# Patient Record
Sex: FEMALE | Race: WHITE | NOT HISPANIC OR LATINO | Employment: UNEMPLOYED | URBAN - METROPOLITAN AREA
[De-identification: names, ages, dates, MRNs, and addresses within clinical notes are randomized per-mention and may not be internally consistent; named-entity substitution may affect disease eponyms.]

---

## 2023-10-03 ENCOUNTER — TELEPHONE (OUTPATIENT)
Dept: PRIMARY CARE | Facility: CLINIC | Age: 58
End: 2023-10-03
Payer: COMMERCIAL

## 2023-10-03 DIAGNOSIS — F41.9 ANXIETY: Primary | ICD-10-CM

## 2023-10-04 RX ORDER — ALPRAZOLAM 0.5 MG/1
0.5 TABLET ORAL
COMMUNITY
Start: 2019-04-10 | End: 2024-01-17 | Stop reason: WASHOUT

## 2023-10-04 RX ORDER — ALPRAZOLAM 0.5 MG/1
0.5-1 TABLET ORAL 2 TIMES DAILY
Qty: 120 TABLET | Refills: 3 | Status: SHIPPED | OUTPATIENT
Start: 2023-10-04 | End: 2024-01-17 | Stop reason: WASHOUT

## 2023-10-22 PROBLEM — M51.369 DEGENERATIVE DISC DISEASE, LUMBAR: Status: ACTIVE | Noted: 2023-10-22

## 2023-10-22 PROBLEM — N95.1 MENOPAUSAL SYMPTOMS: Status: ACTIVE | Noted: 2023-10-22

## 2023-10-22 PROBLEM — R73.03 PREDIABETES: Status: ACTIVE | Noted: 2023-10-22

## 2023-10-22 PROBLEM — R00.2 PALPITATIONS: Status: ACTIVE | Noted: 2023-10-22

## 2023-10-22 PROBLEM — N63.0 BREAST LUMP: Status: ACTIVE | Noted: 2023-10-22

## 2023-10-22 PROBLEM — E03.9 HYPOTHYROIDISM: Status: ACTIVE | Noted: 2023-10-22

## 2023-10-22 PROBLEM — R13.12 OROPHARYNGEAL DYSPHAGIA: Status: ACTIVE | Noted: 2023-10-22

## 2023-10-22 PROBLEM — E66.01 MORBID OBESITY (MULTI): Status: ACTIVE | Noted: 2023-10-22

## 2023-10-22 PROBLEM — I73.9 PERIPHERAL VASCULAR DISEASE (CMS-HCC): Status: ACTIVE | Noted: 2023-10-22

## 2023-10-22 PROBLEM — F41.9 ANXIETY: Status: ACTIVE | Noted: 2023-10-22

## 2023-10-22 PROBLEM — I10 UNCONTROLLED HYPERTENSION: Status: ACTIVE | Noted: 2023-10-22

## 2023-10-22 PROBLEM — E88.819 INSULIN RESISTANCE: Status: ACTIVE | Noted: 2023-10-22

## 2023-10-22 PROBLEM — I87.2 CHRONIC VENOUS INSUFFICIENCY: Status: ACTIVE | Noted: 2023-10-22

## 2023-10-22 PROBLEM — E78.5 HYPERLIPIDEMIA: Status: ACTIVE | Noted: 2023-10-22

## 2023-10-22 PROBLEM — G47.33 OBSTRUCTIVE SLEEP APNEA (ADULT) (PEDIATRIC): Status: ACTIVE | Noted: 2023-10-22

## 2023-10-22 PROBLEM — M17.0 PRIMARY OSTEOARTHRITIS OF BOTH KNEES: Status: ACTIVE | Noted: 2023-10-22

## 2023-10-22 PROBLEM — H91.93 BILATERAL HEARING LOSS: Status: ACTIVE | Noted: 2023-10-22

## 2023-10-22 PROBLEM — N20.0 NEPHROLITHIASIS: Status: ACTIVE | Noted: 2023-10-22

## 2023-10-22 PROBLEM — L03.119 CELLULITIS OF LOWER LEG: Status: ACTIVE | Noted: 2023-10-22

## 2023-10-22 PROBLEM — J31.0 CHRONIC RHINITIS: Status: ACTIVE | Noted: 2023-10-22

## 2023-10-22 PROBLEM — E11.9 DIABETES MELLITUS WITHOUT COMPLICATION (MULTI): Status: ACTIVE | Noted: 2023-10-22

## 2023-10-22 PROBLEM — F41.1 GENERALIZED ANXIETY DISORDER: Status: ACTIVE | Noted: 2023-10-22

## 2023-10-22 PROBLEM — I10 ESSENTIAL HYPERTENSION: Status: ACTIVE | Noted: 2023-10-22

## 2023-10-22 PROBLEM — M25.473 ANKLE EDEMA: Status: ACTIVE | Noted: 2023-10-22

## 2023-10-22 PROBLEM — K21.9 GASTROESOPHAGEAL REFLUX DISEASE: Status: ACTIVE | Noted: 2023-10-22

## 2023-10-22 PROBLEM — M51.36 DEGENERATIVE DISC DISEASE, LUMBAR: Status: ACTIVE | Noted: 2023-10-22

## 2023-10-22 RX ORDER — FUROSEMIDE 20 MG/1
20 TABLET ORAL DAILY
COMMUNITY
End: 2023-11-13

## 2023-10-22 RX ORDER — LISINOPRIL 20 MG/1
1 TABLET ORAL DAILY
COMMUNITY
Start: 2019-04-10 | End: 2024-02-22 | Stop reason: ALTCHOICE

## 2023-10-22 RX ORDER — IPRATROPIUM BROMIDE 21 UG/1
2 SPRAY, METERED NASAL 2 TIMES DAILY
COMMUNITY
End: 2024-02-22 | Stop reason: ALTCHOICE

## 2023-10-22 RX ORDER — TRIAMCINOLONE ACETONIDE 1 MG/G
1 CREAM TOPICAL 2 TIMES DAILY
COMMUNITY

## 2023-10-22 RX ORDER — OMEPRAZOLE 20 MG/1
20 CAPSULE, DELAYED RELEASE ORAL
COMMUNITY
End: 2024-01-23

## 2023-10-22 RX ORDER — METOPROLOL SUCCINATE 50 MG/1
50 TABLET, EXTENDED RELEASE ORAL DAILY
COMMUNITY
Start: 2019-04-10 | End: 2024-02-22 | Stop reason: ALTCHOICE

## 2023-10-22 RX ORDER — LISINOPRIL 40 MG/1
40 TABLET ORAL DAILY
COMMUNITY
End: 2024-01-10

## 2023-10-22 RX ORDER — BLOOD-GLUCOSE CONTROL, NORMAL
EACH MISCELLANEOUS DAILY
COMMUNITY

## 2023-10-22 RX ORDER — NYSTATIN 100000 [USP'U]/G
1 POWDER TOPICAL 2 TIMES DAILY
COMMUNITY
Start: 2019-09-10 | End: 2024-02-22 | Stop reason: ALTCHOICE

## 2023-10-22 RX ORDER — MEDICAL SUPPLY, MISCELLANEOUS
EACH MISCELLANEOUS
COMMUNITY
Start: 2023-09-18

## 2023-10-22 RX ORDER — DULAGLUTIDE 0.75 MG/.5ML
0.75 INJECTION, SOLUTION SUBCUTANEOUS
COMMUNITY
Start: 2022-08-09 | End: 2024-02-22 | Stop reason: ALTCHOICE

## 2023-10-22 RX ORDER — KETOCONAZOLE 20 MG/G
1 CREAM TOPICAL 2 TIMES DAILY
COMMUNITY
End: 2023-11-21 | Stop reason: SDUPTHER

## 2023-10-22 RX ORDER — LANCETS 33 GAUGE
EACH MISCELLANEOUS
COMMUNITY
Start: 2023-09-27

## 2023-10-22 RX ORDER — IBUPROFEN 800 MG/1
800 TABLET ORAL 3 TIMES DAILY PRN
COMMUNITY
End: 2023-12-06 | Stop reason: SDUPTHER

## 2023-10-22 RX ORDER — KETOCONAZOLE 20 MG/ML
1 SHAMPOO, SUSPENSION TOPICAL 2 TIMES DAILY
COMMUNITY
End: 2023-11-21 | Stop reason: SDUPTHER

## 2023-10-22 RX ORDER — HYDROCHLOROTHIAZIDE 25 MG/1
25 TABLET ORAL DAILY PRN
COMMUNITY
End: 2024-02-22 | Stop reason: ALTCHOICE

## 2023-10-22 RX ORDER — MELOXICAM 15 MG/1
15 TABLET ORAL DAILY
COMMUNITY
Start: 2023-06-06 | End: 2024-02-22 | Stop reason: ALTCHOICE

## 2023-10-22 RX ORDER — ACETAMINOPHEN AND CODEINE PHOSPHATE 300; 30 MG/1; MG/1
1 TABLET ORAL EVERY 6 HOURS PRN
COMMUNITY
End: 2024-02-22 | Stop reason: ALTCHOICE

## 2023-10-22 RX ORDER — VITAMIN A 3000 MCG
2 CAPSULE ORAL DAILY PRN
COMMUNITY
Start: 2023-09-21

## 2023-10-22 RX ORDER — ESTRADIOL 0.1 MG/D
1 PATCH, EXTENDED RELEASE TRANSDERMAL 2 TIMES WEEKLY
COMMUNITY

## 2023-10-22 RX ORDER — ALBUTEROL SULFATE 90 UG/1
AEROSOL, METERED RESPIRATORY (INHALATION)
COMMUNITY
Start: 2021-04-10 | End: 2023-11-21 | Stop reason: SDUPTHER

## 2023-10-22 RX ORDER — HYDROCORTISONE 1 %
1 CREAM (GRAM) TOPICAL 2 TIMES DAILY
COMMUNITY
Start: 2023-08-30 | End: 2024-04-12

## 2023-10-22 RX ORDER — LIDOCAINE 50 MG/G
1 PATCH TOPICAL DAILY PRN
COMMUNITY
End: 2024-02-22 | Stop reason: ALTCHOICE

## 2023-10-22 RX ORDER — ESTRADIOL 0.03 MG/D
FILM, EXTENDED RELEASE TRANSDERMAL
COMMUNITY

## 2023-10-22 RX ORDER — BLOOD SUGAR DIAGNOSTIC
STRIP MISCELLANEOUS
COMMUNITY
Start: 2022-03-09

## 2023-10-22 RX ORDER — LEVOTHYROXINE SODIUM 50 UG/1
50 TABLET ORAL
COMMUNITY
End: 2024-02-22 | Stop reason: ALTCHOICE

## 2023-10-22 RX ORDER — FLUTICASONE PROPIONATE 50 UG/1
2 SPRAY, METERED NASAL DAILY
COMMUNITY
Start: 2021-04-27

## 2023-10-22 RX ORDER — NAPROXEN SODIUM 550 MG/1
550 TABLET ORAL 2 TIMES DAILY PRN
COMMUNITY
End: 2024-02-22 | Stop reason: ALTCHOICE

## 2023-10-24 ENCOUNTER — OFFICE VISIT (OUTPATIENT)
Dept: OTOLARYNGOLOGY | Facility: CLINIC | Age: 58
End: 2023-10-24
Payer: COMMERCIAL

## 2023-10-24 ENCOUNTER — CLINICAL SUPPORT (OUTPATIENT)
Dept: AUDIOLOGY | Facility: CLINIC | Age: 58
End: 2023-10-24
Payer: COMMERCIAL

## 2023-10-24 VITALS — TEMPERATURE: 96.8 F | BODY MASS INDEX: 43.4 KG/M2 | WEIGHT: 293 LBS | HEIGHT: 69 IN

## 2023-10-24 DIAGNOSIS — L30.9 DERMATITIS: ICD-10-CM

## 2023-10-24 DIAGNOSIS — H60.549 DERMATITIS OF EAR CANAL, UNSPECIFIED LATERALITY: ICD-10-CM

## 2023-10-24 DIAGNOSIS — R42 DIZZINESS: ICD-10-CM

## 2023-10-24 DIAGNOSIS — H90.3 BILATERAL SENSORINEURAL HEARING LOSS: Primary | ICD-10-CM

## 2023-10-24 DIAGNOSIS — R09.82 POSTNASAL DRIP: ICD-10-CM

## 2023-10-24 DIAGNOSIS — H90.3 SENSORINEURAL HEARING LOSS (SNHL) OF BOTH EARS: Primary | ICD-10-CM

## 2023-10-24 DIAGNOSIS — H93.13 TINNITUS OF BOTH EARS: ICD-10-CM

## 2023-10-24 PROCEDURE — 92557 COMPREHENSIVE HEARING TEST: CPT | Performed by: AUDIOLOGIST

## 2023-10-24 PROCEDURE — 99214 OFFICE O/P EST MOD 30 MIN: CPT | Performed by: OTOLARYNGOLOGY

## 2023-10-24 PROCEDURE — 3044F HG A1C LEVEL LT 7.0%: CPT | Performed by: OTOLARYNGOLOGY

## 2023-10-24 PROCEDURE — 92550 TYMPANOMETRY & REFLEX THRESH: CPT | Performed by: AUDIOLOGIST

## 2023-10-24 PROCEDURE — 4010F ACE/ARB THERAPY RXD/TAKEN: CPT | Performed by: OTOLARYNGOLOGY

## 2023-10-24 PROCEDURE — 1036F TOBACCO NON-USER: CPT | Performed by: OTOLARYNGOLOGY

## 2023-10-24 RX ORDER — FLUOCINOLONE ACETONIDE 0.11 MG/ML
OIL AURICULAR (OTIC)
Qty: 20 ML | Refills: 1 | Status: SHIPPED | OUTPATIENT
Start: 2023-10-24 | End: 2024-03-28 | Stop reason: WASHOUT

## 2023-10-24 NOTE — PROGRESS NOTES
LETICIA Love is a 58 y.o. female presents to discuss more difficulty hearing in background noise.  She had an audiogram today with bilateral sensorineural hearing loss, normal tympanometry.  76% word recognition scores bilaterally.    She also has moisture of the ear canal and itching bilaterally.  She has a history of psoriasis but has not gotten benefit from topical medications that have been prescribed to her in the past    Bothered by postnasal drainage.  She has a lot of intolerances to different nasal sprays.  She uses Flonase at times but it causes some burning.  She is not clear which other nasal sprays she has tried      Past Medical History:   Diagnosis Date    Angina at rest     Obstructive sleep apnea (adult) (pediatric)     Obstructive sleep apnea, adult    Other specified diabetes mellitus without complications (CMS/HCC)     Diabetes mellitus of other type without complication    Other specified diabetes mellitus without complications (CMS/[facility])     Diabetes mellitus of other type without complication    Personal history of other diseases of the circulatory system     History of hypertension    Personal history of other diseases of the circulatory system     History of angina    Personal history of other diseases of the circulatory system     History of hypertension    Personal history of other diseases of the nervous system and sense organs     History of obstructive sleep apnea    Personal history of other diseases of the respiratory system     History of asthma    Personal history of other specified conditions     History of chest pain    Unspecified asthma with (acute) exacerbation     Asthma with acute exacerbation, unspecified asthma severity, unspecified whether persistent            Medications:     Current Outpatient Medications:     acetaminophen-codeine (Tylenol w/ Codeine #3) 300-30 mg tablet, Take 1 tablet by mouth every 6 hours if needed for moderate pain (4 - 6) or severe pain (7  - 10)., Disp: , Rfl:     albuterol (ProAir HFA) 90 mcg/actuation inhaler, USE AS DIRECTED, Disp: , Rfl:     ALPRAZolam (Xanax) 0.5 mg tablet, take 1 to 2 tablets by mouth twice a day, Disp: 120 tablet, Rfl: 3    ALPRAZolam (Xanax) 0.5 mg tablet, Take 1 tablet (0.5 mg) by mouth., Disp: , Rfl:     dulaglutide (Trulicity) 0.75 mg/0.5 mL pen injector, Inject 0.75 mg under the skin 1 (one) time per week., Disp: , Rfl:     estradiol (Vivelle-DOT) 0.025 mg/24 hr patch, Place on the skin., Disp: , Rfl:     Flonase Allergy Relief 50 mcg/actuation nasal spray, Administer 2 sprays into each nostril once daily., Disp: , Rfl:     FreeStyle Test strip, USE AS DIRECTED., Disp: , Rfl:     furosemide (Lasix) 20 mg tablet, Take 1 tablet (20 mg) by mouth once daily., Disp: , Rfl:     hydroCHLOROthiazide (HYDRODiuril) 25 mg tablet, Take 1 tablet (25 mg) by mouth once daily as needed., Disp: , Rfl:     hydrocortisone 1 % cream, Apply 1 Application topically 2 times a day., Disp: , Rfl:     ibuprofen 800 mg tablet, Take 1 tablet (800 mg) by mouth 3 times a day as needed. WITH FOOD OR MILK, Disp: , Rfl:     ipratropium (Atrovent) 21 mcg (0.03 %) nasal spray, Administer 2 sprays into each nostril 2 times a day., Disp: , Rfl:     ketoconazole (NIZOral) 2 % cream, Apply 1 Application topically 2 times a day., Disp: , Rfl:     ketoconazole (NIZOral) 2 % shampoo, Apply 1 Application topically 2 times a day., Disp: , Rfl:     lancets 30 gauge misc, once daily. CHECK ONCE DAILY, Disp: , Rfl:     levothyroxine (Synthroid, Levoxyl) 50 mcg tablet, Take 1 tablet (50 mcg) by mouth once daily in the morning. Take before meals., Disp: , Rfl:     lidocaine (Lidoderm) 5 % patch, Place 1 patch on the skin once daily as needed for severe pain (7 - 10), moderate pain (4 - 6) or mild pain (1 - 3)., Disp: , Rfl:     lisinopril 20 mg tablet, Take 1 tablet (20 mg) by mouth once daily., Disp: , Rfl:     lisinopril 40 mg tablet, Take 1 tablet (40 mg) by mouth  "once daily., Disp: , Rfl:     meloxicam (Mobic) 15 mg tablet, Take 1 tablet (15 mg) by mouth once daily., Disp: , Rfl:     metoprolol succinate XL (Toprol-XL) 50 mg 24 hr tablet, Take 1 tablet (50 mg) by mouth once daily., Disp: , Rfl:     naproxen sodium (Anaprox) 550 mg tablet, Take 1 tablet (550 mg) by mouth 2 times a day as needed for mild pain (1 - 3)., Disp: , Rfl:     nystatin (Nystop) 100,000 unit/gram powder, Apply 1 Application topically 2 times a day., Disp: , Rfl:     omeprazole (PriLOSEC) 20 mg DR capsule, Take 1 capsule (20 mg) by mouth once daily in the morning. Take before meals., Disp: , Rfl:     OneTouch Delica Plus Lancet 30 gauge misc, USE AS DIRECTED, Disp: , Rfl:     OneTouch Ultra Control solution, AS DIRECTED IN VITRO DAILY AS NEEDED, Disp: , Rfl:     Saline NasaL 0.65 % nasal spray, Administer 2 sprays into each nostril once daily as needed., Disp: , Rfl:     triamcinolone (Kenalog) 0.1 % cream, Apply 1 Application topically 2 times a day., Disp: , Rfl:     Vivelle-Dot 0.1 mg/24 hr patch, Place 1 patch on the skin 2 times a week., Disp: , Rfl:      Allergies:  Allergies   Allergen Reactions    Clarithromycin Anaphylaxis and Unknown    Diazepam Anaphylaxis    Erythromycin Anaphylaxis    Lorazepam Anaphylaxis and Unknown    Meperidine Anaphylaxis    Morphine Anaphylaxis    Oxycodone Anaphylaxis and Unknown    Pertussis Vaccines Anaphylaxis    Prednisone Anaphylaxis and Unknown    Tramadol Anaphylaxis    Acetaminophen Unknown    Amoxicillin Unknown    Aspirin Unknown    Dicyclomine Unknown    Diphenoxylate-Atropine Unknown    Doxycycline Unknown    Hydrocodone Unknown    Hydrocodone-Acetaminophen Unknown    Propoxyphene-Acetaminophen Unknown    Pse-Hydrocodone-Pot Guaiaco Unknown        Physical Exam:  Last Recorded Vitals  Temperature 36 °C (96.8 °F), height 1.753 m (5' 9\"), weight 139 kg (306 lb).  General:     General appearance: Well-developed, well-nourished in no acute distress.       " Voice:  normal       Head/face: Normal appearance; nontender to palpation     Facial nerve function: Normal and symmetric bilaterally.    Oral/oropharynx:     Oral vestibule: Normal labial and gingival mucosa     Tongue/floor of mouth: Normal without lesion     Oropharynx: Clear.  No lesions present of the hard/soft palate, posterior pharynx    Neck:     Neck: Normal appearance, trachea midline     Salivary glands: Normal to palpation bilaterally     Lymph nodes: No cervical lymphadenopathy to palpation     Thyroid: No thyromegaly.  No palpable nodules     Range of motion: Normal    Neurological:     Cortical functions: Alert and oriented x3, appropriate affect       Larynx/hypopharynx:     Laryngeal findings: Mirror exam inadequate or limited secondary to enlarged base of tongue and/or excessive gagging    Ear:     Ear canal: Dermatitic changes of the external auditory meatus and canal bilaterally.  No evidence of infection     Tympanic membrane: Intact and mobile bilaterally     Pinna: Normal bilaterally     Hearing:  Gross hearing assessment normal by voice    Nose:     Visualized using: Anterior rhinoscopy     Nasopharynx: Inadequate mirror exam secondary to gag, anatomy.       Nasal dorsum: Nontraumatic midline appearance     Septum: Midline     Inferior turbinates: Normally sized     Mucosa: Bilateral, pink, normal appearing       Assessment/Plan   Her chief issue today is explained by her bilateral hearing loss.  I have completed Medicaid paperwork and recommended hearing aid evaluation.  Recheck audiogram in a year    The itching of her ear canals is apparently dermatitic.  It may be related to her diagnosis of psoriasis.  I recommended a trial of fluocinolone oil and if this is unsuccessful we may try some other topical preparations.  I suggested a dermatology appointment for her multiple skin issues.    We discussed trialing different over-the-counter nasal steroid sprays.  She will do this and keep track  of the benefit or lack thereof and whether or not they are causing her any side effects.  Recheck 1 year, sooner as needed         Yoni Escalante MD

## 2023-10-24 NOTE — PROGRESS NOTES
AUDIOLOGY ADULT AUDIOMETRIC EVALUATION    Name:  Saba Love  :  1965  Age:  58 y.o.  Date of Evaluation:  2023    Reason for visit: Patient is seen in the clinic today at the request of otolaryngology for an audiologic evaluation.     HISTORY  Patient complains of long term hearing loss, childhood ear infections, dizziness, tinnitus and difficulty understanding words.  She complained of some ear pain during tympanogram.    EVALUATION  See scanned audiogram: “Media” > “Audiology Report”.  No images are attached to the encounter or orders placed in the encounter.    RESULTS  Otoscopic Evaluation:  Right Ear: clear ear canal  Left Ear: clear ear canal    Immittance Measures:  Tympanometry:  Right Ear: A  Left Ear:    Ad    Acoustic Reflexes:  Ipsilateral Right Ear: 90 100 100   Ipsilateral Left Ear:    80 85 95  Contralateral Right Ear: did not evaluate  Contralateral Left Ear: did not evaluate    Distortion Product Otoacoustic Emissions (DPOAEs):  Right Ear: PASS 2345  Left Ear:   PASS 80172    Audiometry:  Test Technique and Reliability: FAIR  Standard audiometry via INSERTS. Reliability is FAIR    Pure tone air and bone conduction audiometry:  Right Ear: MAY BE ELEVATED RESULTS OF MILD SNHL TO NORMAL THRESHOLDS .   Left Ear: MAY BE ELEVATED RESULTS OF MODERATE MILD  SNHL TONORMAL THRESHOLDS    Speech Audiometry (Word Recognition Scores):   Right Ear: 76  Left Ear:   76    IMPRESSIONS    POSSIBLY ELEVATED MILD MODERATE HEARING LOSS.  CMN GIVEN AND A RETEST BEFORE FITTING IS RECOMMENDED FOR ACCURACY    The presence of acoustic reflexes within normal intensity limits is consistent with normal middle ear and brainstem function, and suggests that auditory sensitivity is not significantly impaired. An elevated or absent acoustic reflex threshold is consistent with a middle ear disorder, hearing loss in the stimulated ear, and/or interruption of neural innervation of the stapedius muscle. Present  DPOAEs suggest normal/near normal cochlear outer hair cell function and are consistent with no greater than a mild hearing loss at those frequencies. Absent DPOAEs are consistent with abnormal cochlear outer hair cell function and some degree of hearing loss at those frequencies.    RECOMMENDATIONS  - Follow up with otolaryngology today as scheduled.  - Audiologic evaluation as needed.  - Annual audiologic evaluation, sooner if an acute change is noted.  - Audiologic evaluation in conjunction with otologic care, if an acute change is noted, and/or annually.  - Consider hearing aids. Contact insurance to determine if there is an applicable benefit and where it can be used. Contact our office to schedule an appointment should she wish to proceed with hearing aids through our clinic.  - Consider hearing aids. Contact insurance to determine if there is an applicable benefit and where it can be used.  - CMN GIVEN  - Follow-up with audiology annually for routine hearing aid maintenance, sooner if questions/problems arise.  - Follow-up with medical care team as planned.    PATIENT EDUCATION  Discussed results, impressions and recommendations with the patient. Questions were addressed and the patient was encouraged to contact our office should concerns arise.    Time for this encounter: 35-40

## 2023-10-26 ENCOUNTER — TELEPHONE (OUTPATIENT)
Dept: PRIMARY CARE | Facility: CLINIC | Age: 58
End: 2023-10-26
Payer: COMMERCIAL

## 2023-10-26 NOTE — TELEPHONE ENCOUNTER
Pt called stating she would like an Rx for a new walker - she states hers is old and margaret -- she also states she was instructed her current walker could be causing her the Lt arm & chest pain; she states the pain increased after using her walker - she is requesting an Rx for a walker TALL & WIDE  to better suit her and alleviate her pain --- pt is requesting a call back in regard to this --- she can be reached at [phone]

## 2023-10-26 NOTE — TELEPHONE ENCOUNTER
Pt called stating she is out of medication for her inhaler - pt states insurance will only cover one specific inhaler written as such: Albuterol Sulfate HFA Inhaler 90mcg 8.5 grams -- this is required to pass medical necessity

## 2023-11-11 DIAGNOSIS — I87.2 VENOUS INSUFFICIENCY (CHRONIC) (PERIPHERAL): ICD-10-CM

## 2023-11-13 RX ORDER — FUROSEMIDE 20 MG/1
20 TABLET ORAL DAILY
Qty: 90 TABLET | Refills: 1 | Status: SHIPPED | OUTPATIENT
Start: 2023-11-13 | End: 2024-03-28 | Stop reason: SDUPTHER

## 2023-11-21 DIAGNOSIS — L21.0 SEBORRHEA CAPITIS: ICD-10-CM

## 2023-11-21 DIAGNOSIS — R05.9 COUGH, UNSPECIFIED TYPE: Primary | ICD-10-CM

## 2023-11-22 DIAGNOSIS — L21.0 SEBORRHEA CAPITIS: Primary | ICD-10-CM

## 2023-11-22 RX ORDER — ALBUTEROL SULFATE 90 UG/1
2 AEROSOL, METERED RESPIRATORY (INHALATION) EVERY 4 HOURS PRN
Qty: 8 G | Refills: 5 | Status: SHIPPED | OUTPATIENT
Start: 2023-11-22 | End: 2024-11-21

## 2023-11-22 RX ORDER — KETOCONAZOLE 20 MG/G
1 CREAM TOPICAL 2 TIMES DAILY
Qty: 60 G | Refills: 3 | Status: SHIPPED | OUTPATIENT
Start: 2023-11-22 | End: 2024-04-12

## 2023-11-22 RX ORDER — ALBUTEROL SULFATE 90 UG/1
2 AEROSOL, METERED RESPIRATORY (INHALATION) EVERY 6 HOURS PRN
Qty: 8.5 G | Refills: 2 | Status: SHIPPED | OUTPATIENT
Start: 2023-11-22 | End: 2023-11-29 | Stop reason: SDUPTHER

## 2023-11-22 RX ORDER — KETOCONAZOLE 20 MG/ML
1 SHAMPOO, SUSPENSION TOPICAL 2 TIMES DAILY
Qty: 120 ML | Refills: 3 | Status: SHIPPED | OUTPATIENT
Start: 2023-11-22 | End: 2023-12-05 | Stop reason: SDUPTHER

## 2023-11-22 RX ORDER — KETOCONAZOLE 20 MG/ML
SHAMPOO, SUSPENSION TOPICAL
Qty: 360 ML | Refills: 11 | Status: SHIPPED | OUTPATIENT
Start: 2023-11-22 | End: 2024-03-28 | Stop reason: SDUPTHER

## 2023-11-29 ENCOUNTER — TELEPHONE (OUTPATIENT)
Dept: PRIMARY CARE | Facility: CLINIC | Age: 58
End: 2023-11-29
Payer: COMMERCIAL

## 2023-11-29 DIAGNOSIS — R05.9 COUGH, UNSPECIFIED TYPE: ICD-10-CM

## 2023-11-29 DIAGNOSIS — E11.9 DIABETES MELLITUS WITHOUT COMPLICATION (MULTI): Primary | ICD-10-CM

## 2023-11-29 RX ORDER — ALBUTEROL SULFATE 90 UG/1
2 AEROSOL, METERED RESPIRATORY (INHALATION) EVERY 6 HOURS PRN
Qty: 8.5 G | Refills: 2 | Status: SHIPPED | OUTPATIENT
Start: 2023-11-29

## 2023-11-29 RX ORDER — ALBUTEROL SULFATE 90 UG/1
2 AEROSOL, METERED RESPIRATORY (INHALATION) EVERY 6 HOURS PRN
Qty: 8.5 G | Refills: 2 | Status: SHIPPED | OUTPATIENT
Start: 2023-11-29 | End: 2023-11-29

## 2023-11-29 NOTE — TELEPHONE ENCOUNTER
Patient called stating she needs the exact RX sent in due to her insurance. Pro Air HFA Albuterol Sulfate 90 MCG. Sent to [facility].

## 2023-11-30 RX ORDER — LANCETS 30 GAUGE
EACH MISCELLANEOUS
Qty: 1 EACH | Refills: 1 | Status: SHIPPED | OUTPATIENT
Start: 2023-11-30

## 2023-12-05 DIAGNOSIS — L21.0 SEBORRHEA CAPITIS: ICD-10-CM

## 2023-12-05 DIAGNOSIS — M51.36 DDD (DEGENERATIVE DISC DISEASE), LUMBAR: Primary | ICD-10-CM

## 2023-12-05 RX ORDER — KETOCONAZOLE 20 MG/ML
1 SHAMPOO, SUSPENSION TOPICAL 2 TIMES DAILY
Qty: 360 ML | Refills: 3 | Status: SHIPPED | OUTPATIENT
Start: 2023-12-05 | End: 2023-12-13 | Stop reason: SDUPTHER

## 2023-12-05 NOTE — TELEPHONE ENCOUNTER
ketoconazole (NIZOral) 2 % shampoo     Pt called stating her Rx for this shampoo is supposed to written for x3 bottles at one time -- she states she is going out of town Friday, and she would like this refilled asap

## 2023-12-06 RX ORDER — IBUPROFEN 800 MG/1
TABLET ORAL
Qty: 90 TABLET | Refills: 3 | Status: SHIPPED | OUTPATIENT
Start: 2023-12-06 | End: 2024-05-09

## 2023-12-12 ENCOUNTER — TELEPHONE (OUTPATIENT)
Dept: PRIMARY CARE | Facility: CLINIC | Age: 58
End: 2023-12-12
Payer: COMMERCIAL

## 2023-12-12 NOTE — TELEPHONE ENCOUNTER
[facility] for [facility] roll[facility]? If a list of approved companies is needed you can call Hattie @ [facility] [phone].

## 2023-12-13 DIAGNOSIS — L21.0 SEBORRHEA CAPITIS: ICD-10-CM

## 2023-12-13 RX ORDER — KETOCONAZOLE 20 MG/ML
1 SHAMPOO, SUSPENSION TOPICAL 2 TIMES DAILY
Qty: 360 ML | Refills: 3 | Status: SHIPPED | OUTPATIENT
Start: 2023-12-13

## 2023-12-15 ENCOUNTER — TELEPHONE (OUTPATIENT)
Dept: PRIMARY CARE | Facility: CLINIC | Age: 58
End: 2023-12-15
Payer: COMMERCIAL

## 2023-12-27 ENCOUNTER — TELEPHONE (OUTPATIENT)
Dept: PRIMARY CARE | Facility: CLINIC | Age: 58
End: 2023-12-27
Payer: COMMERCIAL

## 2023-12-27 NOTE — TELEPHONE ENCOUNTER
Patient needs prior authorization for ketoconazole shampoo.  She needs 3 bottles per month.  She was told a prior auth was needed due to the amount.

## 2024-01-05 DIAGNOSIS — F41.9 ANXIETY: ICD-10-CM

## 2024-01-05 RX ORDER — ALPRAZOLAM 0.5 MG/1
0.5-1 TABLET ORAL 2 TIMES DAILY
Qty: 120 TABLET | Refills: 0 | OUTPATIENT
Start: 2024-01-05

## 2024-01-05 NOTE — TELEPHONE ENCOUNTER
PT called again requesting status on this - she states [facility] has not received fax - she would like to verify [facility] in [place] - she is requesting a call back

## 2024-01-10 DIAGNOSIS — I10 ESSENTIAL (PRIMARY) HYPERTENSION: ICD-10-CM

## 2024-01-10 RX ORDER — LISINOPRIL 40 MG/1
40 TABLET ORAL DAILY
Qty: 90 TABLET | Refills: 3 | Status: SHIPPED | OUTPATIENT
Start: 2024-01-10 | End: 2024-05-07 | Stop reason: ALTCHOICE

## 2024-01-17 ENCOUNTER — OFFICE VISIT (OUTPATIENT)
Dept: PRIMARY CARE | Facility: CLINIC | Age: 59
End: 2024-01-17
Payer: COMMERCIAL

## 2024-01-17 VITALS
SYSTOLIC BLOOD PRESSURE: 148 MMHG | HEART RATE: 95 BPM | WEIGHT: 293 LBS | DIASTOLIC BLOOD PRESSURE: 102 MMHG | BODY MASS INDEX: 44.41 KG/M2 | HEIGHT: 68 IN | OXYGEN SATURATION: 96 %

## 2024-01-17 DIAGNOSIS — I10 ESSENTIAL HYPERTENSION: Primary | ICD-10-CM

## 2024-01-17 DIAGNOSIS — I87.2 CHRONIC VENOUS INSUFFICIENCY: ICD-10-CM

## 2024-01-17 DIAGNOSIS — E88.819 INSULIN RESISTANCE: ICD-10-CM

## 2024-01-17 DIAGNOSIS — F41.9 ANXIETY: ICD-10-CM

## 2024-01-17 DIAGNOSIS — S81.802D OPEN WOUND OF LEFT LOWER LEG, SUBSEQUENT ENCOUNTER: ICD-10-CM

## 2024-01-17 DIAGNOSIS — E11.9 DIABETES MELLITUS WITHOUT COMPLICATION (MULTI): ICD-10-CM

## 2024-01-17 LAB — POC HEMOGLOBIN A1C: 5.9 % (ref 4.2–6.5)

## 2024-01-17 PROCEDURE — 3080F DIAST BP >= 90 MM HG: CPT | Performed by: PHYSICIAN ASSISTANT

## 2024-01-17 PROCEDURE — 4010F ACE/ARB THERAPY RXD/TAKEN: CPT | Performed by: PHYSICIAN ASSISTANT

## 2024-01-17 PROCEDURE — 99214 OFFICE O/P EST MOD 30 MIN: CPT | Performed by: PHYSICIAN ASSISTANT

## 2024-01-17 PROCEDURE — 1036F TOBACCO NON-USER: CPT | Performed by: PHYSICIAN ASSISTANT

## 2024-01-17 PROCEDURE — 3077F SYST BP >= 140 MM HG: CPT | Performed by: PHYSICIAN ASSISTANT

## 2024-01-17 RX ORDER — ALPRAZOLAM 0.5 MG/1
0.5-1 TABLET ORAL 2 TIMES DAILY
Qty: 120 TABLET | Refills: 2 | Status: SHIPPED | OUTPATIENT
Start: 2024-02-02 | End: 2024-03-28 | Stop reason: SDUPTHER

## 2024-01-17 RX ORDER — ACETAMINOPHEN 500 MG
TABLET ORAL
Qty: 1 EACH | Refills: 0 | Status: SHIPPED | OUTPATIENT
Start: 2024-01-17 | End: 2024-01-17 | Stop reason: SDUPTHER

## 2024-01-17 RX ORDER — ACETAMINOPHEN 500 MG
TABLET ORAL
Qty: 1 EACH | Refills: 0 | Status: SHIPPED | OUTPATIENT
Start: 2024-01-17

## 2024-01-17 ASSESSMENT — PATIENT HEALTH QUESTIONNAIRE - PHQ9
1. LITTLE INTEREST OR PLEASURE IN DOING THINGS: NOT AT ALL
2. FEELING DOWN, DEPRESSED OR HOPELESS: NOT AT ALL
SUM OF ALL RESPONSES TO PHQ9 QUESTIONS 1 AND 2: 0

## 2024-01-17 ASSESSMENT — COLUMBIA-SUICIDE SEVERITY RATING SCALE - C-SSRS
6. HAVE YOU EVER DONE ANYTHING, STARTED TO DO ANYTHING, OR PREPARED TO DO ANYTHING TO END YOUR LIFE?: NO
1. IN THE PAST MONTH, HAVE YOU WISHED YOU WERE DEAD OR WISHED YOU COULD GO TO SLEEP AND NOT WAKE UP?: NO
2. HAVE YOU ACTUALLY HAD ANY THOUGHTS OF KILLING YOURSELF?: NO

## 2024-01-17 ASSESSMENT — PAIN SCALES - GENERAL: PAINLEVEL: 6

## 2024-01-17 NOTE — PROGRESS NOTES
"Subjective   Patient ID: Saba Hurtado is a 58 y.o. female who presents for Med Refill and sores on bilateral legs.    Presents for routine follow up - has received her rollator which has been helpful.   States that she needs a new BP cuff, specifically the Omron 7 series. Other cuffs do not fit and are inaccurate.   Also requesting refill alprazolam - will need to change pharmacies to [facility]. [place]AR[place] reviewed. Last UDS 4/23  Has persistent nausea, bloating, distension of abdomen. Had GI visit 2 years ago.   Has seen cardiology for L sided chest discomfort which comes and goes. Is worse at night before bed and continuous for 30 minutes or so.     Med Refill         Review of Systems   All other systems reviewed and are negative.      Objective   BP (!) 148/102   Pulse 95   Ht 1.727 m (5' 8\")   Wt 145 kg (318 lb 9.6 oz)   SpO2 96%   BMI 48.44 kg/m²     Physical Exam  Constitutional:       Appearance: Normal appearance. She is obese.   HENT:      Right Ear: Tympanic membrane normal.      Left Ear: Tympanic membrane normal.   Eyes:      Pupils: Pupils are equal, round, and reactive to light.   Cardiovascular:      Rate and Rhythm: Regular rhythm.   Pulmonary:      Breath sounds: Normal breath sounds.   Musculoskeletal:      Cervical back: Neck supple.      Right lower leg: Edema present.      Left lower leg: Edema present.   Skin:     General: Skin is dry.      Findings: Erythema present.   Neurological:      Mental Status: She is alert.         Assessment/Plan   Diagnoses and all orders for this visit:  Essential hypertension  -     blood pressure test kit-wrist kit; OMRON 7 Series wrist cuff - tests BP as needed  Anxiety  -     ALPRAZolam (Xanax) 0.5 mg tablet; Take 1-2 tablets (0.5-1 mg) by mouth 2 times a day. Do not start before February 2, 2024.  Insulin resistance  Diabetes mellitus without complication (CMS/[facility])  -     POCT glycosylated hemoglobin (Hb A1C) manually resulted  Chronic venous " insufficiency  -     Referral to Wound Clinic; Future  -     Referral to Vascular Medicine; Future  Open wound of left lower leg, subsequent encounter  -     Referral to Wound Clinic; Future  -     Referral to Vascular Medicine; Future

## 2024-01-22 DIAGNOSIS — K21.9 GASTROESOPHAGEAL REFLUX DISEASE WITHOUT ESOPHAGITIS: Primary | ICD-10-CM

## 2024-01-23 RX ORDER — OMEPRAZOLE 20 MG/1
CAPSULE, DELAYED RELEASE ORAL
Qty: 90 CAPSULE | Refills: 1 | Status: SHIPPED | OUTPATIENT
Start: 2024-01-23

## 2024-02-05 ENCOUNTER — CLINICAL SUPPORT (OUTPATIENT)
Dept: WOUND CARE | Facility: HOSPITAL | Age: 59
End: 2024-02-05
Payer: COMMERCIAL

## 2024-02-05 DIAGNOSIS — I87.2 CHRONIC VENOUS INSUFFICIENCY: ICD-10-CM

## 2024-02-05 DIAGNOSIS — S81.802D OPEN WOUND OF LEFT LOWER LEG, SUBSEQUENT ENCOUNTER: ICD-10-CM

## 2024-02-05 PROCEDURE — 11042 DBRDMT SUBQ TIS 1ST 20SQCM/<: CPT

## 2024-02-05 PROCEDURE — 99213 OFFICE O/P EST LOW 20 MIN: CPT | Mod: 25

## 2024-02-05 PROCEDURE — 11045 DBRDMT SUBQ TISS EACH ADDL: CPT

## 2024-02-13 ENCOUNTER — CLINICAL SUPPORT (OUTPATIENT)
Dept: WOUND CARE | Facility: HOSPITAL | Age: 59
End: 2024-02-13
Payer: COMMERCIAL

## 2024-02-13 PROCEDURE — 11045 DBRDMT SUBQ TISS EACH ADDL: CPT

## 2024-02-13 PROCEDURE — 11042 DBRDMT SUBQ TIS 1ST 20SQCM/<: CPT

## 2024-02-20 ENCOUNTER — APPOINTMENT (OUTPATIENT)
Dept: WOUND CARE | Facility: HOSPITAL | Age: 59
End: 2024-02-20
Payer: COMMERCIAL

## 2024-02-22 ENCOUNTER — CONSULT (OUTPATIENT)
Dept: VASCULAR SURGERY | Facility: CLINIC | Age: 59
End: 2024-02-22
Payer: COMMERCIAL

## 2024-02-22 VITALS
WEIGHT: 293 LBS | HEIGHT: 68 IN | HEART RATE: 75 BPM | SYSTOLIC BLOOD PRESSURE: 206 MMHG | OXYGEN SATURATION: 96 % | BODY MASS INDEX: 44.41 KG/M2 | DIASTOLIC BLOOD PRESSURE: 112 MMHG

## 2024-02-22 DIAGNOSIS — I87.2 CHRONIC VENOUS INSUFFICIENCY: ICD-10-CM

## 2024-02-22 DIAGNOSIS — L03.115 CELLULITIS OF RIGHT LOWER LIMB: ICD-10-CM

## 2024-02-22 DIAGNOSIS — S81.802D OPEN WOUND OF LEFT LOWER LEG, SUBSEQUENT ENCOUNTER: ICD-10-CM

## 2024-02-22 PROCEDURE — 99215 OFFICE O/P EST HI 40 MIN: CPT | Performed by: SURGERY

## 2024-02-22 NOTE — PROGRESS NOTES
Vascular Surgery Clinic Note    CC: Bilateral venous stasis ulceration    HPI:  Saba Hurtado is 58 y.o. female with history of bilateral venous stasis ulceration and lymphedema referred for evaluation of her wounds. She states they have been present for years and are worsening. She does go to wound care regularly for debridement without resolution. Wears compression stockings. She does not work currently but did work many jobs requiring standing on her feet. Family history of varicose veins though she denies any prominent ones.     Past Vascular History:  None    Past Vascular Testing:  None    Medical History:  Patient Active Problem List   Diagnosis    Ankle edema    Anxiety    Breast lump    Bilateral hearing loss    Cellulitis of lower leg    Chronic rhinitis    Chronic venous insufficiency    Degenerative disc disease, lumbar    Diabetes mellitus without complication (CMS/HCC)    Essential hypertension    Gastroesophageal reflux disease    Generalized anxiety disorder    Hyperlipidemia    Hypothyroidism    Insulin resistance    Menopausal symptoms    Morbid obesity (CMS/[facility])    Nephrolithiasis    Obstructive sleep apnea (adult) (pediatric)    Oropharyngeal dysphagia    Palpitations    Peripheral vascular disease (CMS/[facility])    Prediabetes    Primary osteoarthritis of both knees    Uncontrolled hypertension        Meds:   Current Outpatient Medications on File Prior to Visit   Medication Sig Dispense Refill    albuterol (Ventolin HFA) 90 mcg/actuation inhaler Inhale 2 puffs every 4 hours if needed for wheezing or shortness of breath. 8 g 5    ALPRAZolam (Xanax) 0.5 mg tablet Take 1-2 tablets (0.5-1 mg) by mouth 2 times a day. Do not start before February 2, 2024. 120 tablet 2    estradiol (Vivelle-DOT) 0.025 mg/24 hr patch Place on the skin.      Flonase Allergy Relief 50 mcg/actuation nasal spray Administer 2 sprays into each nostril once daily.      hydrocortisone 1 % cream Apply 1 Application topically 2  times a day.      ibuprofen 800 mg tablet TAKE 1 TABLET BY MOUTH THREE TIMES A DAY WITH FOOD OR MILK AS NEEDED 90 tablet 3    ketoconazole (NIZOral) 2 % cream Apply 1 Application topically 2 times a day. 60 g 3    ketoconazole (NIZOral) 2 % shampoo APPLY 1 APPLICATION AFFECTED AREA TWICE A  mL 11    ketoconazole (NIZOral) 2 % shampoo Apply 1 Application topically 2 times a day. 360 mL 3    lisinopril 40 mg tablet TAKE 1 TABLET BY MOUTH EVERY DAY 90 tablet 3    omeprazole (PriLOSEC) 20 mg DR capsule TAKE 1 CAPSULE BY MOUTH 30 MINUTES BEFORE MORNING MEAL ONCE A DAY FOR 90 DAY(S) 90 capsule 1    ProAir HFA 90 mcg/actuation inhaler Inhale 2 puffs every 6 hours if needed for wheezing. USE AS DIRECTED 8.5 g 2    Saline NasaL 0.65 % nasal spray Administer 2 sprays into each nostril once daily as needed.      triamcinolone (Kenalog) 0.1 % cream Apply 1 Application topically 2 times a day.      Vivelle-Dot 0.1 mg/24 hr patch Place 1 patch on the skin 2 times a week.      blood pressure test kit-wrist kit OMRON 7 Series wrist cuff - tests BP as needed 1 each 0    blood-glucose meter (OneTouch Ultra2 Meter) misc AS DIRECTED 1 each 1    fluocinolone (DermOtic) 0.01 % ear drops 4 drops to affected ear as directed (Patient not taking: Reported on 2/22/2024) 20 mL 1    FreeStyle Test strip USE AS DIRECTED.      furosemide (Lasix) 20 mg tablet TAKE 1 TABLET BY MOUTH EVERY DAY (Patient not taking: Reported on 2/22/2024) 90 tablet 1    lancets 30 gauge misc once daily. CHECK ONCE DAILY      OneTouch Delica Plus Lancet 30 gauge misc USE AS DIRECTED      OneTouch Ultra Control solution AS DIRECTED IN VITRO DAILY AS NEEDED      [DISCONTINUED] acetaminophen-codeine (Tylenol w/ Codeine #3) 300-30 mg tablet Take 1 tablet by mouth every 6 hours if needed for moderate pain (4 - 6) or severe pain (7 - 10).      [DISCONTINUED] dulaglutide (Trulicity) 0.75 mg/0.5 mL pen injector Inject 0.75 mg under the skin 1 (one) time per week.       [DISCONTINUED] hydroCHLOROthiazide (HYDRODiuril) 25 mg tablet Take 1 tablet (25 mg) by mouth once daily as needed.      [DISCONTINUED] ipratropium (Atrovent) 21 mcg (0.03 %) nasal spray Administer 2 sprays into each nostril 2 times a day.      [DISCONTINUED] levothyroxine (Synthroid, Levoxyl) 50 mcg tablet Take 1 tablet (50 mcg) by mouth once daily in the morning. Take before meals.      [DISCONTINUED] lidocaine (Lidoderm) 5 % patch Place 1 patch on the skin once daily as needed for severe pain (7 - 10), moderate pain (4 - 6) or mild pain (1 - 3).      [DISCONTINUED] lisinopril 20 mg tablet Take 1 tablet (20 mg) by mouth once daily.      [DISCONTINUED] meloxicam (Mobic) 15 mg tablet Take 1 tablet (15 mg) by mouth once daily.      [DISCONTINUED] metoprolol succinate XL (Toprol-XL) 50 mg 24 hr tablet Take 1 tablet (50 mg) by mouth once daily.      [DISCONTINUED] naproxen sodium (Anaprox) 550 mg tablet Take 1 tablet (550 mg) by mouth 2 times a day as needed for mild pain (1 - 3).      [DISCONTINUED] nystatin (Nystop) 100,000 unit/gram powder Apply 1 Application topically 2 times a day.       No current facility-administered medications on file prior to visit.        Allergies:   Allergies   Allergen Reactions    Clarithromycin Anaphylaxis and Unknown    Diazepam Anaphylaxis    Erythromycin Anaphylaxis    Lorazepam Anaphylaxis and Unknown    Meperidine Anaphylaxis    Morphine Anaphylaxis    Oxycodone Anaphylaxis and Unknown    Pertussis Vaccines Anaphylaxis    Prednisone Anaphylaxis and Unknown    Tramadol Anaphylaxis    Acetaminophen Unknown    Amoxicillin Unknown    Aspirin Unknown    Dicyclomine Unknown    Diphenoxylate-Atropine Unknown    Doxycycline Unknown    Hydrocodone Unknown    Hydrocodone-Acetaminophen Unknown    Propoxyphene-Acetaminophen Unknown    Pse-Hydrocodone-Pot Guaiaco Unknown       SH:    Social Determinants of Health     Tobacco Use: Low Risk  (1/17/2024)    Patient History     Smoking Tobacco Use:  Never     Smokeless Tobacco Use: Never     Passive Exposure: Not on file   Alcohol Use: Not on file   Financial Resource Strain: Not on file   Food Insecurity: Not on file   Transportation Needs: Not on file   Physical Activity: Not on file   Stress: Not on file   Social Connections: Not on file   Intimate Partner Violence: Not on file   Depression: Not at risk (1/17/2024)    PHQ-2     PHQ-2 Score: 0   Housing Stability: Not on file   Utilities: Not on file   Digital Equity: Not on file        FH:  Family History   Problem Relation Name Age of Onset    Migraines Daughter          ROS:  All systems were reviewed and are negative except as per HPI.    Objective:  Vitals:  Vitals:    02/22/24 1040   BP: (!) 206/112   Pulse: 75   SpO2: 96%        Exam:  Constitutional: normal, well appearing  HEENT: normocephalic  CV: regular rate  RESP: symmetric expansion, unlabored breathing  GI: soft, nontender, nondistended  MSK: normal ROM  INT: no lesions  PSYCH: appropriate mood  NEURO: no deficits  VASC:     Assessment & Plan:  Saba Hurtado is 58 y.o. female with bilateral chronic venous insufficiency CEAP 6     - Referral to Dr. Bacon for further management  - Ordered bilateral venous insufficiency studies    Meds  - No ASA or statin    Screening/Surveillance  - None    Next Followup  - PRN    Nan Barney MD

## 2024-02-22 NOTE — PATIENT INSTRUCTIONS
-Referral to Dr. Bacon regarding venous insufficiency and ulcerations  - Venous insufficiency study    - NOTE TO WOUND CARE - please avoid aggressive scraping or unnecessary debridement until she can be treated for her chronic venous insufficiency and lymphedema. Poor wound healing status currently

## 2024-02-27 ENCOUNTER — APPOINTMENT (OUTPATIENT)
Dept: WOUND CARE | Facility: HOSPITAL | Age: 59
End: 2024-02-27
Payer: COMMERCIAL

## 2024-03-04 DIAGNOSIS — E03.8 OTHER SPECIFIED HYPOTHYROIDISM: Primary | ICD-10-CM

## 2024-03-04 RX ORDER — LEVOTHYROXINE SODIUM 50 UG/1
TABLET ORAL
Qty: 90 TABLET | Refills: 1 | Status: SHIPPED | OUTPATIENT
Start: 2024-03-04

## 2024-03-05 ENCOUNTER — OFFICE VISIT (OUTPATIENT)
Dept: WOUND CARE | Facility: HOSPITAL | Age: 59
End: 2024-03-05
Payer: COMMERCIAL

## 2024-03-05 ENCOUNTER — APPOINTMENT (OUTPATIENT)
Dept: WOUND CARE | Facility: HOSPITAL | Age: 59
End: 2024-03-05
Payer: COMMERCIAL

## 2024-03-05 DIAGNOSIS — L97.909 VENOUS ULCER (MULTI): Primary | ICD-10-CM

## 2024-03-05 DIAGNOSIS — I83.009 VENOUS ULCER (MULTI): Primary | ICD-10-CM

## 2024-03-05 PROCEDURE — 99213 OFFICE O/P EST LOW 20 MIN: CPT | Mod: 25

## 2024-03-05 PROCEDURE — 11042 DBRDMT SUBQ TIS 1ST 20SQCM/<: CPT

## 2024-03-05 PROCEDURE — 87186 SC STD MICRODIL/AGAR DIL: CPT | Mod: WESLAB

## 2024-03-08 ENCOUNTER — APPOINTMENT (OUTPATIENT)
Dept: VASCULAR MEDICINE | Facility: CLINIC | Age: 59
End: 2024-03-08
Payer: COMMERCIAL

## 2024-03-09 LAB
BACTERIA SPEC CULT: ABNORMAL
BACTERIA SPEC CULT: ABNORMAL
GRAM STN SPEC: ABNORMAL
GRAM STN SPEC: ABNORMAL

## 2024-03-11 ENCOUNTER — APPOINTMENT (OUTPATIENT)
Dept: WOUND CARE | Facility: HOSPITAL | Age: 59
End: 2024-03-11
Payer: COMMERCIAL

## 2024-03-18 ENCOUNTER — APPOINTMENT (OUTPATIENT)
Dept: WOUND CARE | Facility: HOSPITAL | Age: 59
End: 2024-03-18
Payer: COMMERCIAL

## 2024-03-26 ENCOUNTER — APPOINTMENT (OUTPATIENT)
Dept: WOUND CARE | Facility: HOSPITAL | Age: 59
End: 2024-03-26
Payer: COMMERCIAL

## 2024-03-28 ENCOUNTER — OFFICE VISIT (OUTPATIENT)
Dept: PRIMARY CARE | Facility: CLINIC | Age: 59
End: 2024-03-28
Payer: COMMERCIAL

## 2024-03-28 VITALS
BODY MASS INDEX: 44.41 KG/M2 | WEIGHT: 293 LBS | OXYGEN SATURATION: 97 % | HEART RATE: 75 BPM | HEIGHT: 68 IN | SYSTOLIC BLOOD PRESSURE: 182 MMHG | DIASTOLIC BLOOD PRESSURE: 98 MMHG

## 2024-03-28 DIAGNOSIS — I87.2 CHRONIC VENOUS INSUFFICIENCY: ICD-10-CM

## 2024-03-28 DIAGNOSIS — F41.9 ANXIETY: ICD-10-CM

## 2024-03-28 DIAGNOSIS — S81.802D OPEN WOUND OF LEFT LOWER LEG, SUBSEQUENT ENCOUNTER: ICD-10-CM

## 2024-03-28 DIAGNOSIS — E11.9 DIABETES MELLITUS WITHOUT COMPLICATION (MULTI): Primary | ICD-10-CM

## 2024-03-28 DIAGNOSIS — I10 ESSENTIAL HYPERTENSION: ICD-10-CM

## 2024-03-28 DIAGNOSIS — I87.2 VENOUS INSUFFICIENCY (CHRONIC) (PERIPHERAL): ICD-10-CM

## 2024-03-28 LAB
AMPHETAMINES UR QL SCN>1000 NG/ML: NEGATIVE
BARBITURATES UR QL SCN>300 NG/ML: NEGATIVE
BENZODIAZ UR QL SCN>300 NG/ML: NEGATIVE
BZE UR QL SCN>300 NG/ML: NEGATIVE
CANNABINOIDS UR QL SCN>50 NG/ML: NEGATIVE
FENTANYL+NORFENTANYL UR QL SCN: NEGATIVE
METHADONE UR QL SCN>300 NG/ML: NEGATIVE
OPIATES UR QL SCN>300 NG/ML: NEGATIVE
OXYCODONE UR QL: NEGATIVE
PCP UR QL SCN>25 NG/ML: NEGATIVE

## 2024-03-28 PROCEDURE — 99215 OFFICE O/P EST HI 40 MIN: CPT | Performed by: PHYSICIAN ASSISTANT

## 2024-03-28 PROCEDURE — 3077F SYST BP >= 140 MM HG: CPT | Performed by: PHYSICIAN ASSISTANT

## 2024-03-28 PROCEDURE — 3080F DIAST BP >= 90 MM HG: CPT | Performed by: PHYSICIAN ASSISTANT

## 2024-03-28 PROCEDURE — 4010F ACE/ARB THERAPY RXD/TAKEN: CPT | Performed by: PHYSICIAN ASSISTANT

## 2024-03-28 PROCEDURE — 80307 DRUG TEST PRSMV CHEM ANLYZR: CPT | Performed by: PHYSICIAN ASSISTANT

## 2024-03-28 RX ORDER — GAUZE BANDAGE 4.5"X147"
1 BANDAGE TOPICAL DAILY
Qty: 30 EACH | Refills: 3 | Status: SHIPPED | OUTPATIENT
Start: 2024-03-28

## 2024-03-28 RX ORDER — ELASTIC BANDAGE 4" X 5YARD
1 BANDAGE TOPICAL DAILY
Qty: 30 EACH | Refills: 3 | Status: SHIPPED | OUTPATIENT
Start: 2024-03-28

## 2024-03-28 RX ORDER — MUPIROCIN 20 MG/G
OINTMENT TOPICAL
COMMUNITY
Start: 2024-03-02 | End: 2024-03-28 | Stop reason: SDUPTHER

## 2024-03-28 RX ORDER — CLINDAMYCIN HYDROCHLORIDE 150 MG/1
150 CAPSULE ORAL
COMMUNITY
Start: 2024-03-11 | End: 2024-03-18

## 2024-03-28 RX ORDER — FUROSEMIDE 20 MG/1
20 TABLET ORAL DAILY
Qty: 90 TABLET | Refills: 1 | Status: SHIPPED | OUTPATIENT
Start: 2024-03-28 | End: 2024-05-07 | Stop reason: ALTCHOICE

## 2024-03-28 RX ORDER — ALPRAZOLAM 0.5 MG/1
0.5-1 TABLET ORAL 2 TIMES DAILY
Qty: 120 TABLET | Refills: 2 | Status: SHIPPED | OUTPATIENT
Start: 2024-04-01

## 2024-03-28 RX ORDER — SILICONE,DRESSING/FOAM BANDAGE 6" X 6"
1 BANDAGE TOPICAL DAILY
Qty: 30 EACH | Refills: 3 | Status: SHIPPED | OUTPATIENT
Start: 2024-03-28

## 2024-03-28 RX ORDER — LEVOFLOXACIN 500 MG/1
500 TABLET, FILM COATED ORAL DAILY
COMMUNITY
Start: 2024-03-11 | End: 2024-03-28 | Stop reason: WASHOUT

## 2024-03-28 RX ORDER — MUPIROCIN 20 MG/G
OINTMENT TOPICAL
Qty: 22 G | Refills: 3 | Status: SHIPPED | OUTPATIENT
Start: 2024-03-28 | End: 2024-05-21 | Stop reason: SDUPTHER

## 2024-03-28 ASSESSMENT — PATIENT HEALTH QUESTIONNAIRE - PHQ9
SUM OF ALL RESPONSES TO PHQ9 QUESTIONS 1 AND 2: 0
2. FEELING DOWN, DEPRESSED OR HOPELESS: NOT AT ALL
1. LITTLE INTEREST OR PLEASURE IN DOING THINGS: NOT AT ALL

## 2024-03-28 ASSESSMENT — PAIN SCALES - GENERAL: PAINLEVEL: 0-NO PAIN

## 2024-03-28 NOTE — PROGRESS NOTES
"Subjective   Patient ID: Saba Hurtado is a 58 y.o. female who presents for leg ulcers.    Presents for several concerns -   Has been going to wound care for LE - continues with ulcerations and swelling. Has had cultures that show pseudomonas and MSSA. Was prescribed Levofloxacin with elevated blood pressure readings.   Had unna boots in the past which caused a rash. Inquiring on zinc toxicity and levels .  Requesting refill mupirocin which helps along with Aquacel.        Review of Systems   All other systems reviewed and are negative.      Objective   BP (!) 182/98   Pulse 75   Ht 1.727 m (5' 8\")   Wt 147 kg (325 lb)   SpO2 97%   BMI 49.42 kg/m²     Physical Exam  Constitutional:       Appearance: She is obese.   HENT:      Mouth/Throat:      Pharynx: Oropharynx is clear.   Cardiovascular:      Rate and Rhythm: Normal rate and regular rhythm.   Pulmonary:      Breath sounds: Normal breath sounds.   Skin:     Comments: RLE skin breakdown and opening; tender. Dressings reapplied. LLE scabbed wounds without drainage   Neurological:      Mental Status: She is alert.     Error collecting drug screen    Assessment/Plan   Diagnoses and all orders for this visit:  Diabetes mellitus without complication (CMS/[facility])  -     non-adher band-sunflowr-petrol (Oil Emulsion Dressing) 3 X 8 \" bandage; Apply 1 Units topically once daily.  -     gauze bandage (Kerlix) 4 1/2 X 147 \" bandage; Apply 1 Units topically once daily.  -     elastic bandage (Coban) 4 X 5 \"-yard bandage; Apply 1 Units topically once daily.  Open wound of left lower leg, subsequent encounter  -     mupirocin (Bactroban) 2 % ointment; APPLY TO AFFECTED AREA TWICE DAILY UNTIL HEALED  -     non-adher band-sunflowr-petrol (Oil Emulsion Dressing) 3 X 8 \" bandage; Apply 1 Units topically once daily.  -     gauze bandage (Kerlix) 4 1/2 X 147 \" bandage; Apply 1 Units topically once daily.  -     elastic bandage (Coban) 4 X 5 \"-yard bandage; Apply 1 Units " "topically once daily.  Chronic venous insufficiency  -     non-adher band-sunflowr-petrol (Oil Emulsion Dressing) 3 X 8 \" bandage; Apply 1 Units topically once daily.  -     gauze bandage (Kerlix) 4 1/2 X 147 \" bandage; Apply 1 Units topically once daily.  -     elastic bandage (Coban) 4 X 5 \"-yard bandage; Apply 1 Units topically once daily.  Essential hypertension  Venous insufficiency (chronic) (peripheral)  -     furosemide (Lasix) 20 mg tablet; Take 1 tablet (20 mg) by mouth once daily.         "

## 2024-04-09 ENCOUNTER — TELEPHONE (OUTPATIENT)
Dept: PRIMARY CARE | Facility: CLINIC | Age: 59
End: 2024-04-09

## 2024-04-09 DIAGNOSIS — J31.0 CHRONIC RHINITIS: Primary | ICD-10-CM

## 2024-04-09 NOTE — TELEPHONE ENCOUNTER
Patient is requesting a letter stating she has a medical allergy to marijuana.  She would like 2 copies sent to her.

## 2024-04-10 DIAGNOSIS — L21.0 SEBORRHEA CAPITIS: ICD-10-CM

## 2024-04-10 DIAGNOSIS — L03.119 CELLULITIS OF LOWER LEG: Primary | ICD-10-CM

## 2024-04-12 RX ORDER — KETOCONAZOLE 20 MG/G
CREAM TOPICAL
Qty: 90 G | Refills: 3 | Status: SHIPPED | OUTPATIENT
Start: 2024-04-12

## 2024-04-12 RX ORDER — HYDROCORTISONE 1 %
CREAM (GRAM) TOPICAL
Qty: 454 G | Refills: 11 | Status: SHIPPED | OUTPATIENT
Start: 2024-04-12

## 2024-04-17 NOTE — TELEPHONE ENCOUNTER
Per Constantine,  There is no documented allergy, if there is hypersensitvity to the smell there is nothing he can document until she is tested by an allergist.     She agrees to seeing an allergist, she is requesting referral.

## 2024-04-17 NOTE — TELEPHONE ENCOUNTER
We have not done any testing on this to confirm allergies. She can see an allergist if she would max any confirmation of allergens.

## 2024-04-17 NOTE — TELEPHONE ENCOUNTER
Spoke with patient, she said this wasn't an issue before but since it has become legal its been bothering her more.     She said it increases her heart rate etc and she does not know if its an allergy or a reaction to it.   She is asking what she should do going forward?

## 2024-05-01 ENCOUNTER — TELEPHONE (OUTPATIENT)
Dept: PRIMARY CARE | Facility: CLINIC | Age: 59
End: 2024-05-01
Payer: COMMERCIAL

## 2024-05-01 NOTE — TELEPHONE ENCOUNTER
Patient states all the fluids and wounds on her legs are draining very fast and she is asking for more refills on the supplies to say twice a day.

## 2024-05-07 ENCOUNTER — OFFICE VISIT (OUTPATIENT)
Dept: PULMONOLOGY | Facility: CLINIC | Age: 59
End: 2024-05-07
Payer: COMMERCIAL

## 2024-05-07 ENCOUNTER — HOSPITAL ENCOUNTER (OUTPATIENT)
Dept: CARDIOLOGY | Facility: HOSPITAL | Age: 59
Discharge: HOME | End: 2024-05-07
Payer: COMMERCIAL

## 2024-05-07 ENCOUNTER — OFFICE VISIT (OUTPATIENT)
Dept: CARDIOLOGY | Facility: CLINIC | Age: 59
End: 2024-05-07
Payer: COMMERCIAL

## 2024-05-07 VITALS
HEIGHT: 69 IN | WEIGHT: 293 LBS | HEART RATE: 80 BPM | SYSTOLIC BLOOD PRESSURE: 180 MMHG | OXYGEN SATURATION: 97 % | BODY MASS INDEX: 43.4 KG/M2 | DIASTOLIC BLOOD PRESSURE: 105 MMHG

## 2024-05-07 VITALS — HEART RATE: 71 BPM | OXYGEN SATURATION: 94 % | WEIGHT: 293 LBS | BODY MASS INDEX: 47.99 KG/M2

## 2024-05-07 DIAGNOSIS — I87.2 CHRONIC VENOUS INSUFFICIENCY: ICD-10-CM

## 2024-05-07 DIAGNOSIS — I10 ESSENTIAL HYPERTENSION: ICD-10-CM

## 2024-05-07 DIAGNOSIS — E66.01 MORBID OBESITY (MULTI): ICD-10-CM

## 2024-05-07 DIAGNOSIS — M51.36 DDD (DEGENERATIVE DISC DISEASE), LUMBAR: ICD-10-CM

## 2024-05-07 DIAGNOSIS — R00.2 PALPITATIONS: ICD-10-CM

## 2024-05-07 DIAGNOSIS — R06.02 SHORTNESS OF BREATH: Primary | ICD-10-CM

## 2024-05-07 DIAGNOSIS — R07.89 CHEST DISCOMFORT: ICD-10-CM

## 2024-05-07 DIAGNOSIS — I73.9 PERIPHERAL VASCULAR DISEASE (CMS-HCC): ICD-10-CM

## 2024-05-07 DIAGNOSIS — R42 DIZZINESS: ICD-10-CM

## 2024-05-07 DIAGNOSIS — J45.909 ASTHMA, UNSPECIFIED ASTHMA SEVERITY, UNSPECIFIED WHETHER COMPLICATED, UNSPECIFIED WHETHER PERSISTENT (HHS-HCC): ICD-10-CM

## 2024-05-07 DIAGNOSIS — I10 ESSENTIAL HYPERTENSION: Primary | ICD-10-CM

## 2024-05-07 DIAGNOSIS — J31.0 CHRONIC RHINITIS: ICD-10-CM

## 2024-05-07 DIAGNOSIS — I10 UNCONTROLLED HYPERTENSION: ICD-10-CM

## 2024-05-07 DIAGNOSIS — G47.33 OBSTRUCTIVE SLEEP APNEA (ADULT) (PEDIATRIC): ICD-10-CM

## 2024-05-07 PROCEDURE — 93010 ELECTROCARDIOGRAM REPORT: CPT | Performed by: INTERNAL MEDICINE

## 2024-05-07 PROCEDURE — 3077F SYST BP >= 140 MM HG: CPT | Performed by: NURSE PRACTITIONER

## 2024-05-07 PROCEDURE — 99205 OFFICE O/P NEW HI 60 MIN: CPT | Performed by: PEDIATRICS

## 2024-05-07 PROCEDURE — 3080F DIAST BP >= 90 MM HG: CPT | Performed by: NURSE PRACTITIONER

## 2024-05-07 PROCEDURE — 99204 OFFICE O/P NEW MOD 45 MIN: CPT | Performed by: NURSE PRACTITIONER

## 2024-05-07 PROCEDURE — 93005 ELECTROCARDIOGRAM TRACING: CPT

## 2024-05-07 PROCEDURE — 1036F TOBACCO NON-USER: CPT | Performed by: PEDIATRICS

## 2024-05-07 RX ORDER — FLUTICASONE PROPIONATE AND SALMETEROL 250; 50 UG/1; UG/1
1 POWDER RESPIRATORY (INHALATION)
Qty: 60 EACH | Refills: 11 | Status: SHIPPED | OUTPATIENT
Start: 2024-05-07

## 2024-05-07 RX ORDER — AMLODIPINE BESYLATE 5 MG/1
5 TABLET ORAL DAILY
Qty: 30 TABLET | Refills: 11 | Status: SHIPPED | OUTPATIENT
Start: 2024-05-07 | End: 2025-05-07

## 2024-05-07 ASSESSMENT — PATIENT HEALTH QUESTIONNAIRE - PHQ9
2. FEELING DOWN, DEPRESSED OR HOPELESS: NOT AT ALL
1. LITTLE INTEREST OR PLEASURE IN DOING THINGS: NOT AT ALL
SUM OF ALL RESPONSES TO PHQ9 QUESTIONS 1 AND 2: 0

## 2024-05-07 NOTE — PROGRESS NOTES
Primary Care Physician: Constantine Gomez PA-C  Primary Cardiologist:      Date of Visit: 05/07/2024 11:00 AM EDT  Location of visit:  [facility]   Type of Visit: New Patient        Chief Complaint   Patient presents with    New Patient Visit     Here for irregular heartbeat and blood pressure, dizziness, light headedness, fatigued, pressure in her chest and SOB        HPI / Summary:   Saba Hurtado is a 58 y.o. female who presents to establish cardiac care   Past medical history significant for HTN, hypothyroid on replacement, morbid obesity BMI 47, chronic venous stasis with wounds / dermatitis      C/o palpitations, shortness of breath feeling as if she cannot take a full breath, associated with palpitations / tachycardia, chest discomfort, headache, edema and malaise.   Has been on antihypertensives in the past but stopped due to symptomatic hypotension   Swelling and lower extremity edema are  bothersome unable to complete the testing with an open wound  She experiences reactive airway to smells and smoke and reports an allergy to marijuana   Manual recheck /105 mmHg     12 system review is negative except as noted above  Accompanied by her  Maxx who contributed to the history     Medical History:   Past Medical History:   Diagnosis Date    Angina at rest (CMS-formerly [facility])     Obstructive sleep apnea (adult) (pediatric)     Obstructive sleep apnea, adult    Other specified diabetes mellitus without complications (Multi)     Diabetes mellitus of other type without complication    Other specified diabetes mellitus without complications (Multi)     Diabetes mellitus of other type without complication    Personal history of other diseases of the circulatory system     History of hypertension    Personal history of other diseases of the circulatory system     History of angina    Personal history of other diseases of the circulatory system     History of hypertension    Personal history of other  "diseases of the nervous system and sense organs     History of obstructive sleep apnea    Personal history of other diseases of the respiratory system     History of asthma    Personal history of other specified conditions     History of chest pain    Unspecified asthma with (acute) exacerbation ([facility])     Asthma with acute exacerbation, unspecified asthma severity, unspecified whether persistent       Surgical History:   Past Surgical History:   Procedure Laterality Date    OTHER SURGICAL HISTORY  12/28/2021    Knee surgery    OTHER SURGICAL HISTORY  12/28/2021    Appendectomy    OTHER SURGICAL HISTORY  12/28/2021    Hysterectomy    OTHER SURGICAL HISTORY  12/28/2021    Tonsillectomy    OTHER SURGICAL HISTORY  12/28/2021    Hysterectomy    OTHER SURGICAL HISTORY  12/28/2021    Knee surgery    OTHER SURGICAL HISTORY  12/28/2021    Breast surgery    OTHER SURGICAL HISTORY  12/28/2021    Appendectomy    OTHER SURGICAL HISTORY  12/28/2021    Tonsillectomy       Family History:   Family History   Problem Relation Name Age of Onset    Migraines Daughter         Social History:   Tobacco Use: Low Risk  (5/8/2024)    Patient History     Smoking Tobacco Use: Never     Smokeless Tobacco Use: Never     Passive Exposure: Not on file             MEDICATIONS:   Current Outpatient Medications   Medication Instructions    albuterol (Ventolin HFA) 90 mcg/actuation inhaler 2 puffs, inhalation, Every 4 hours PRN    ALPRAZolam (XANAX) 0.5-1 mg, oral, 2 times daily    amLODIPine (NORVASC) 5 mg, oral, Daily    blood pressure test kit-wrist kit OMRON 7 Series wrist cuff - tests BP as needed    blood-glucose meter (OneTouch Ultra2 Meter) misc AS DIRECTED    elastic bandage (Coban) 4 X 5 \"-yard bandage 1 Units, topical (top), Daily    estradiol (Vivelle-DOT) 0.025 mg/24 hr patch transdermal    Flonase Allergy Relief 50 mcg/actuation nasal spray 2 sprays, Each Nostril, Daily    fluticasone propion-salmeteroL (Advair Diskus) 250-50 " "mcg/dose diskus inhaler 1 puff, inhalation, 2 times daily RT, Rinse mouth with water after use to reduce aftertaste and incidence of candidiasis. Do not swallow.    FreeStyle Test strip  USE AS DIRECTED.    hydrocortisone 1 % cream 1 APPLICATION EXTERNALLY TWICE A DAY 30 DAYS    ibuprofen 800 mg tablet TAKE 1 TABLET BY MOUTH THREE TIMES A DAY WITH FOOD OR MILK AS NEEDED    Kerlix 1 Units, topical (top), Daily    ketoconazole (NIZOral) 2 % cream APPLY 1 APPLICATION TO AFFECTED AREA TWICE A DAY EXTERNALLY 45    ketoconazole (NIZOral) 2 % shampoo 1 Application, Topical, 2 times daily    lancets 30 gauge misc Daily, CHECK ONCE DAILY    levothyroxine (Synthroid, Levoxyl) 50 mcg tablet TAKE 1 TABLET BY MOUTH EVERY DAY IN THE MORNING ON AN EMPTY STOMACH FOR 90 DAYS    mupirocin (Bactroban) 2 % ointment APPLY TO AFFECTED AREA TWICE DAILY UNTIL HEALED    non-adher band-sunflowr-petrol (Oil Emulsion Dressing) 3 X 8 \" bandage 1 Units, topical (top), Daily    omeprazole (PriLOSEC) 20 mg DR capsule TAKE 1 CAPSULE BY MOUTH 30 MINUTES BEFORE MORNING MEAL ONCE A DAY FOR 90 DAY(S)    OneTouch Delica Plus Lancet 30 gauge misc  USE AS DIRECTED    OneTouch Ultra Control solution  AS DIRECTED IN VITRO DAILY AS NEEDED    ProAir HFA 90 mcg/actuation inhaler 2 puffs, inhalation, Every 6 hours PRN, USE AS DIRECTED    Saline NasaL 0.65 % nasal spray 2 sprays, Each Nostril, Daily PRN    triamcinolone (Kenalog) 0.1 % cream 1 Application, Topical, 2 times daily    Vivelle-Dot 0.1 mg/24 hr patch 1 patch, transdermal, 2 times weekly         IMAGING REVIEWED:       Vascular US lower extremity venous duplex 5/26/2023  IMPRESSION:  Negative study.  No deep venous thrombosis of the  left lower  extremity.      LABS:  CBC:   Lab Results   Component Value Date    WBC 10.5 04/04/2023    RBC 4.91 (H) 04/04/2023    HGB 14.9 04/04/2023    HCT 45.4 (H) 04/04/2023    MCV 92.5 04/04/2023    MCH 30.3 04/04/2023    MCHC 32.8 04/04/2023     04/04/2023    " "MPV 9.4 04/04/2023     CBC with Differential:    Lab Results   Component Value Date    WBC 10.5 04/04/2023    RBC 4.91 (H) 04/04/2023    HGB 14.9 04/04/2023    HCT 45.4 (H) 04/04/2023     04/04/2023    MCV 92.5 04/04/2023    MCH 30.3 04/04/2023    MCHC 32.8 04/04/2023    NRBC 0 04/04/2023    LYMPHOPCT 36.20 10/21/2021    MONOPCT 6.70 10/21/2021    BASOPCT 0.70 10/21/2021    MONOSABS 0.72 10/21/2021    LYMPHSABS 3.89 (H) 10/21/2021    EOSABS 0.23 10/21/2021    BASOSABS 0.07 10/21/2021     CMP:    Lab Results   Component Value Date     04/04/2023    K 4.2 04/04/2023     04/04/2023    CO2 26 04/04/2023    BUN 15 04/04/2023    CREATININE 0.8 04/04/2023    GLUCOSE 96 04/04/2023    PROT 7.1 04/04/2023    CALCIUM 9.3 04/04/2023    BILITOT 1.4 (H) 04/04/2023    ALKPHOS 85 04/04/2023    AST 21 04/04/2023    ALT 24 04/04/2023     BMP:    Lab Results   Component Value Date     04/04/2023    K 4.2 04/04/2023     04/04/2023    CO2 26 04/04/2023    BUN 15 04/04/2023    CREATININE 0.8 04/04/2023    CALCIUM 9.3 04/04/2023    GLUCOSE 96 04/04/2023     Magnesium:  Lab Results   Component Value Date    MG 2.0 05/21/2019     Troponin:  No results found for: \"TROPHS\"  BNP: No results found for: \"BNP\"    Lipid Panel:  Lab Results   Component Value Date    HDL 48 (L) 04/04/2023    CHHDL 3.1 04/04/2023    TRIG 76 04/04/2023        Lab work and imaging results independently reviewed by me         Visit Vitals  BP (!) 180/105   Pulse 80   Ht 1.753 m (5' 9\")   Wt 147 kg (325 lb)   SpO2 97%   BMI 47.99 kg/m²   Smoking Status Never   BSA 2.68 m²          ECG dated 5/07/2024 independently reviewed   NSR 81        Constitutional:       Appearance: Healthy appearance. Not in distress. Morbidly obese.   Eyes:      Conjunctiva/sclera: Conjunctivae normal.   Neck:      Vascular: JVD normal.   Pulmonary:      Effort: Pulmonary effort is normal.      Breath sounds: Normal breath sounds.   Cardiovascular:      PMI at left " midclavicular line. Normal rate. Regular rhythm. Normal S1. Normal S2.       Murmurs: There is no murmur.      No rub.   Pulses:     Intact distal pulses.   Edema:     Peripheral edema absent.   Abdominal:      General: Bowel sounds are normal.   Musculoskeletal:      Cervical back: Neck supple. Skin:     General: Skin is warm and dry.   Neurological:      Mental Status: Alert and oriented to person, place and time.           Problem List Items Addressed This Visit             ICD-10-CM    Chronic venous insufficiency I87.2    Essential hypertension - Primary I10    Relevant Medications    amLODIPine (Norvasc) 5 mg tablet    Other Relevant Orders    ECG 12 lead (Ancillary Performed) (Completed)    Palpitations R00.2    Relevant Orders    Holter Or Event Cardiac Monitor    Transthoracic echo (TTE) complete    Peripheral vascular disease (CMS-HCC) I73.9    Uncontrolled hypertension I10     Other Visit Diagnoses         Codes    Dizziness     R42    Relevant Orders    Holter Or Event Cardiac Monitor    Transthoracic echo (TTE) complete    Chest discomfort     R07.89    Relevant Orders    Holter Or Event Cardiac Monitor    Transthoracic echo (TTE) complete          Add Amlodipine 5 mg   Home BP monitoring  Echocardiogram r/o structural disease   Extended Holter to correlate palpitations, r/o arrhythmia   F/U 6 weeks to review test results / Home BP log           05/10/24 at 9:59 AM - DEVANG Thompson-CNP        Followup Appts:  No future appointments.

## 2024-05-07 NOTE — PROGRESS NOTES
Subjective   Patient ID: Saba Hurtado is a 58 y.o. female who presents for asthma, rhinitis      HPI    Ms Hurtado is a 58yr old with lifelong asthma here to establish care.  She uses albuterol only.  She has never been on a maintenance inhaler.  She also has a lot of seasonal allergic symptoms with fall and spring being her worst times.    She is concerned because lately her asthma has been less controlled.  She attributes that to having a neighbor who smokes marijuana constantly and the smoke comes from that apartment into hers which causes her to get short of breath and wheeze.  She also has significant daytime sleepiness and has been told she stops breathing at night.  She has never been worked up for sleep apnea.    She never smoked      Review of Systems    See scanned documents attached to this note for review of systems, and appropriate scales/scores for this visit.     Objective   Physical Exam  Constitutional:       Appearance: Normal appearance.   HENT:      Head: Normocephalic and atraumatic.      Mouth/Throat:      Pharynx: Oropharynx is clear.   Cardiovascular:      Rate and Rhythm: Normal rate and regular rhythm.      Pulses: Normal pulses.      Heart sounds: Normal heart sounds.   Pulmonary:      Effort: Pulmonary effort is normal.      Breath sounds: Normal breath sounds. No wheezing, rhonchi or rales.   Abdominal:      General: Bowel sounds are normal.      Palpations: Abdomen is soft.   Musculoskeletal:         General: Normal range of motion.   Skin:     General: Skin is warm and dry.   Neurological:      General: No focal deficit present.      Mental Status: She is alert and oriented to person, place, and time.   Psychiatric:         Mood and Affect: Mood normal.       Assessment/Plan      58yr old with asthma and seasonal allergic rhinitis with several triggers    Asthma:  not on maintenance inhaler  - trial of advair  - continue albuterol as needed  - will get PFT to establish baseline  -  patient states marijuana smoke is a significant trigger for her and is asking for a letter to present to her landlord to get the other tenant to stop smoking.  - letter written scanned into EMR    Allergic rhinitis:    - suggest flonase, astepro, antihistamines  - follow up with allergy if these do not control symptoms    3. Sleep disordered breathing:  likely PETTY  - ordered split night PSG  - follow up with sleep medicine    Follow up after testing      Ryan Rodriguez MD 05/07/24 9:10 AM

## 2024-05-07 NOTE — LETTER
May 8, 2024     Constantine Gomez PA-C  [address]  [place] [address]    Patient: Saba Hurtado   YOB: 1965   Date of Visit: 5/7/2024       Dear Dr. Constantine Gomez PA-C:    Thank you for referring Saba Hurtado to me for evaluation. Below are my notes for this consultation.  If you have questions, please do not hesitate to call me. I look forward to following your patient along with you.       Sincerely,     Ryan Rodriguez MD      CC: No Recipients  ______________________________________________________________________________________    Subjective  Patient ID: Saba Hurtado is a 58 y.o. female who presents for asthma, rhinitis      HPI    Ms Hurtado is a 58yr old with lifelong asthma here to establish care.  She uses albuterol only.  She has never been on a maintenance inhaler.  She also has a lot of seasonal allergic symptoms with fall and spring being her worst times.    She is concerned because lately her asthma has been less controlled.  She attributes that to having a neighbor who smokes marijuana constantly and the smoke comes from that apartment into hers which causes her to get short of breath and wheeze.  She also has significant daytime sleepiness and has been told she stops breathing at night.  She has never been worked up for sleep apnea.    She never smoked      Review of Systems    See scanned documents attached to this note for review of systems, and appropriate scales/scores for this visit.     Objective  Physical Exam  Constitutional:       Appearance: Normal appearance.   HENT:      Head: Normocephalic and atraumatic.      Mouth/Throat:      Pharynx: Oropharynx is clear.   Cardiovascular:      Rate and Rhythm: Normal rate and regular rhythm.      Pulses: Normal pulses.      Heart sounds: Normal heart sounds.   Pulmonary:      Effort: Pulmonary effort is normal.      Breath sounds: Normal breath sounds. No wheezing, rhonchi or rales.   Abdominal:       General: Bowel sounds are normal.      Palpations: Abdomen is soft.   Musculoskeletal:         General: Normal range of motion.   Skin:     General: Skin is warm and dry.   Neurological:      General: No focal deficit present.      Mental Status: She is alert and oriented to person, place, and time.   Psychiatric:         Mood and Affect: Mood normal.       Assessment/Plan     58yr old with asthma and seasonal allergic rhinitis with several triggers    Asthma:  not on maintenance inhaler  - trial of advair  - continue albuterol as needed  - will get PFT to establish baseline  - patient states marijuana smoke is a significant trigger for her and is asking for a letter to present to her landlord to get the other tenant to stop smoking.  - letter written scanned into EMR    Allergic rhinitis:    - suggest flonase, astepro, antihistamines  - follow up with allergy if these do not control symptoms    3. Sleep disordered breathing:  likely PETTY  - ordered split night PSG  - follow up with sleep medicine    Follow up after testing      Ryan Rodriguez MD 05/07/24 9:10 AM

## 2024-05-08 ENCOUNTER — APPOINTMENT (OUTPATIENT)
Dept: PRIMARY CARE | Facility: CLINIC | Age: 59
End: 2024-05-08
Payer: COMMERCIAL

## 2024-05-09 RX ORDER — IBUPROFEN 800 MG/1
TABLET ORAL
Qty: 90 TABLET | Refills: 3 | Status: SHIPPED | OUTPATIENT
Start: 2024-05-09

## 2024-05-21 DIAGNOSIS — S81.802D OPEN WOUND OF LEFT LOWER LEG, SUBSEQUENT ENCOUNTER: ICD-10-CM

## 2024-05-21 RX ORDER — MUPIROCIN 20 MG/G
OINTMENT TOPICAL
Qty: 22 G | Refills: 3 | Status: SHIPPED | OUTPATIENT
Start: 2024-05-21 | End: 2024-06-06

## 2024-05-21 NOTE — TELEPHONE ENCOUNTER
Patient mentions since her wounds have gotten worst and doubled on supplies. She has been using more of the mupirocin. She request to send in twice that ordered to [facility].

## 2024-05-24 LAB
ATRIAL RATE: 81 BPM
P AXIS: 27 DEGREES
P OFFSET: 210 MS
P ONSET: 142 MS
PR INTERVAL: 152 MS
Q ONSET: 218 MS
QRS COUNT: 13 BEATS
QRS DURATION: 96 MS
QT INTERVAL: 400 MS
QTC CALCULATION(BAZETT): 464 MS
QTC FREDERICIA: 442 MS
R AXIS: 6 DEGREES
T AXIS: 27 DEGREES
T OFFSET: 418 MS
VENTRICULAR RATE: 81 BPM

## 2024-05-29 ENCOUNTER — HOSPITAL ENCOUNTER (OUTPATIENT)
Dept: RESPIRATORY THERAPY | Facility: HOSPITAL | Age: 59
End: 2024-05-29
Payer: COMMERCIAL

## 2024-06-05 DIAGNOSIS — S81.802D OPEN WOUND OF LEFT LOWER LEG, SUBSEQUENT ENCOUNTER: ICD-10-CM

## 2024-06-05 DIAGNOSIS — F41.9 ANXIETY: ICD-10-CM

## 2024-06-06 RX ORDER — MUPIROCIN 20 MG/G
OINTMENT TOPICAL
Qty: 22 G | Refills: 3 | Status: SHIPPED | OUTPATIENT
Start: 2024-06-06

## 2024-06-07 DIAGNOSIS — L21.0 SEBORRHEA CAPITIS: ICD-10-CM

## 2024-06-07 DIAGNOSIS — L03.119 CELLULITIS OF LOWER LEG: ICD-10-CM

## 2024-06-07 DIAGNOSIS — N95.1 MENOPAUSAL SYMPTOMS: Primary | ICD-10-CM

## 2024-06-07 DIAGNOSIS — E11.9 DIABETES MELLITUS WITHOUT COMPLICATION (MULTI): ICD-10-CM

## 2024-06-07 DIAGNOSIS — M51.36 DDD (DEGENERATIVE DISC DISEASE), LUMBAR: ICD-10-CM

## 2024-06-07 DIAGNOSIS — S81.802D OPEN WOUND OF LEFT LOWER LEG, SUBSEQUENT ENCOUNTER: ICD-10-CM

## 2024-06-07 DIAGNOSIS — R05.9 COUGH, UNSPECIFIED TYPE: ICD-10-CM

## 2024-06-07 DIAGNOSIS — F41.9 ANXIETY: ICD-10-CM

## 2024-06-07 NOTE — TELEPHONE ENCOUNTER
SEE RX  LAST VISIT 03/28/24   PLEASE SEND Mupirocin and Estuardo-Dot to [id number]

## 2024-06-19 RX ORDER — MUPIROCIN 20 MG/G
OINTMENT TOPICAL
Qty: 22 G | Refills: 3 | Status: SHIPPED | OUTPATIENT
Start: 2024-06-19

## 2024-06-19 RX ORDER — IBUPROFEN 800 MG/1
TABLET ORAL
Qty: 90 TABLET | Refills: 3 | Status: SHIPPED | OUTPATIENT
Start: 2024-06-19

## 2024-06-19 RX ORDER — KETOCONAZOLE 20 MG/G
CREAM TOPICAL 2 TIMES DAILY
Qty: 90 G | Refills: 3 | Status: SHIPPED | OUTPATIENT
Start: 2024-06-19

## 2024-06-19 RX ORDER — KETOCONAZOLE 20 MG/ML
1 SHAMPOO, SUSPENSION TOPICAL 2 TIMES DAILY
Qty: 360 ML | Refills: 3 | Status: SHIPPED | OUTPATIENT
Start: 2024-06-19

## 2024-06-19 RX ORDER — ALBUTEROL SULFATE 90 UG/1
2 AEROSOL, METERED RESPIRATORY (INHALATION) EVERY 6 HOURS PRN
Qty: 8.5 G | Refills: 2 | Status: SHIPPED | OUTPATIENT
Start: 2024-06-19

## 2024-06-19 RX ORDER — MEDICAL SUPPLY, MISCELLANEOUS
1 EACH MISCELLANEOUS AS NEEDED
Qty: 1 EACH | Refills: 1 | Status: SHIPPED | OUTPATIENT
Start: 2024-06-19

## 2024-06-19 RX ORDER — HYDROCORTISONE 1 %
CREAM (GRAM) TOPICAL
Qty: 454 G | Refills: 11 | Status: SHIPPED | OUTPATIENT
Start: 2024-06-19

## 2024-06-19 RX ORDER — LANCETS 33 GAUGE
1 EACH MISCELLANEOUS 2 TIMES DAILY
Qty: 100 EACH | Refills: 11 | Status: SHIPPED | OUTPATIENT
Start: 2024-06-19

## 2024-06-19 RX ORDER — VITAMIN A 3000 MCG
2 CAPSULE ORAL DAILY PRN
Qty: 30 ML | Refills: 11 | Status: SHIPPED | OUTPATIENT
Start: 2024-06-19

## 2024-06-19 RX ORDER — ALPRAZOLAM 0.5 MG/1
0.5-1 TABLET ORAL 2 TIMES DAILY
Qty: 120 TABLET | Refills: 0 | Status: SHIPPED | OUTPATIENT
Start: 2024-06-19

## 2024-06-19 RX ORDER — ALBUTEROL SULFATE 90 UG/1
2 AEROSOL, METERED RESPIRATORY (INHALATION) EVERY 4 HOURS PRN
Qty: 8 G | Refills: 5 | Status: SHIPPED | OUTPATIENT
Start: 2024-06-19 | End: 2025-06-19

## 2024-06-19 RX ORDER — FLUTICASONE PROPIONATE 50 UG/1
2 SPRAY, METERED NASAL DAILY
Qty: 16 G | Refills: 11 | Status: SHIPPED | OUTPATIENT
Start: 2024-06-19

## 2024-06-19 RX ORDER — ESTRADIOL 0.1 MG/D
1 PATCH, EXTENDED RELEASE TRANSDERMAL 2 TIMES WEEKLY
Qty: 8 PATCH | Refills: 3 | Status: SHIPPED | OUTPATIENT
Start: 2024-06-20

## 2024-06-19 NOTE — TELEPHONE ENCOUNTER
Pt called stating that her alprazolam, as she will be running low on it. Pt would like this medication sent to the [facility], updated pharmacy into pt's chart.

## 2024-06-21 ENCOUNTER — OFFICE VISIT (OUTPATIENT)
Dept: PRIMARY CARE | Facility: CLINIC | Age: 59
End: 2024-06-21
Payer: COMMERCIAL

## 2024-06-21 VITALS
WEIGHT: 293 LBS | BODY MASS INDEX: 44.41 KG/M2 | OXYGEN SATURATION: 98 % | HEIGHT: 68 IN | HEART RATE: 72 BPM | DIASTOLIC BLOOD PRESSURE: 80 MMHG | SYSTOLIC BLOOD PRESSURE: 152 MMHG

## 2024-06-21 DIAGNOSIS — S81.802D OPEN WOUND OF LEFT LOWER LEG, SUBSEQUENT ENCOUNTER: ICD-10-CM

## 2024-06-21 DIAGNOSIS — I87.2 CHRONIC VENOUS INSUFFICIENCY: Primary | ICD-10-CM

## 2024-06-21 DIAGNOSIS — L97.308 VENOUS STASIS ULCER OF ANKLE WITH OTHER ULCER SEVERITY, UNSPECIFIED LATERALITY, UNSPECIFIED WHETHER VARICOSE VEINS PRESENT (MULTI): ICD-10-CM

## 2024-06-21 DIAGNOSIS — I83.003 VENOUS STASIS ULCER OF ANKLE WITH OTHER ULCER SEVERITY, UNSPECIFIED LATERALITY, UNSPECIFIED WHETHER VARICOSE VEINS PRESENT (MULTI): ICD-10-CM

## 2024-06-21 PROCEDURE — 99214 OFFICE O/P EST MOD 30 MIN: CPT | Performed by: PHYSICIAN ASSISTANT

## 2024-06-21 PROCEDURE — 3075F SYST BP GE 130 - 139MM HG: CPT | Performed by: PHYSICIAN ASSISTANT

## 2024-06-21 PROCEDURE — 3078F DIAST BP <80 MM HG: CPT | Performed by: PHYSICIAN ASSISTANT

## 2024-06-21 RX ORDER — FUROSEMIDE 20 MG/1
20 TABLET ORAL DAILY PRN
Qty: 30 TABLET | Refills: 1 | Status: SHIPPED | OUTPATIENT
Start: 2024-06-21 | End: 2025-06-21

## 2024-06-21 ASSESSMENT — PATIENT HEALTH QUESTIONNAIRE - PHQ9
1. LITTLE INTEREST OR PLEASURE IN DOING THINGS: NOT AT ALL
SUM OF ALL RESPONSES TO PHQ9 QUESTIONS 1 AND 2: 0
2. FEELING DOWN, DEPRESSED OR HOPELESS: NOT AT ALL

## 2024-06-21 ASSESSMENT — PAIN SCALES - GENERAL: PAINLEVEL: 0-NO PAIN

## 2024-06-21 NOTE — PROGRESS NOTES
"Subjective   Patient ID: Mrs. Saba Hurtado is a 58 y.o. female who presents for Med Refill (Patient c/o ulcers on legs//bp ).    Presents for follow up - continues with B/L LE edema and wound drainage. Has had increased drainage and burning pain with this wound. Has been changing twice/day but running out of supplies. Skin remains open though slightly improving. Swelling has been uncontrolled as well.     Med Refill         Review of Systems   All other systems reviewed and are negative.      Objective   /78   Pulse 72   Ht 1.727 m (5' 8\")   Wt 146 kg (321 lb)   SpO2 98%   BMI 48.81 kg/m²     Physical Exam  Constitutional:       Appearance: She is obese.   Cardiovascular:      Rate and Rhythm: Normal rate and regular rhythm.   Musculoskeletal:      Comments: Calf circumference with dressings 51cm   Neurological:      Mental Status: She is alert.         Assessment/Plan   Diagnoses and all orders for this visit:  Chronic venous insufficiency  -     General supply request VELCRO COMPRESSION WRAP SIZE XL  -     furosemide (Lasix) 20 mg tablet; Take 1 tablet (20 mg) by mouth once daily as needed (edema).  Venous stasis ulcer of ankle with other ulcer severity, unspecified laterality, unspecified whether varicose veins present (Multi)  -     General supply request VELCRO COMPRESSION WRAP SIZE XL  -     furosemide (Lasix) 20 mg tablet; Take 1 tablet (20 mg) by mouth once daily as needed (edema).  Open wound of left lower leg, subsequent encounter    Recommend changing dressings every 2 hours as dressings are saturated.   Oil emulsion  Thick ABD  Compression gauze  Velcro compression       "

## 2024-06-25 ENCOUNTER — TELEPHONE (OUTPATIENT)
Dept: PRIMARY CARE | Facility: CLINIC | Age: 59
End: 2024-06-25
Payer: COMMERCIAL

## 2024-06-25 DIAGNOSIS — Z79.899 LONG-TERM CURRENT USE OF BENZODIAZEPINE: Primary | ICD-10-CM

## 2024-06-25 NOTE — TELEPHONE ENCOUNTER
Patient called with questions if the new order and supplies have been sent over to [facility].  She states it should be some leg wraps and compression support. Advise

## 2024-06-25 NOTE — TELEPHONE ENCOUNTER
Dr. CARPIO could you please give a diagnosis code to be used for pt's [facility] drug screening. Pt was seen on 6/21/2024.

## 2024-07-09 VITALS
HEART RATE: 72 BPM | HEIGHT: 68 IN | OXYGEN SATURATION: 98 % | DIASTOLIC BLOOD PRESSURE: 78 MMHG | SYSTOLIC BLOOD PRESSURE: 138 MMHG | BODY MASS INDEX: 44.41 KG/M2 | WEIGHT: 293 LBS

## 2024-07-10 ENCOUNTER — APPOINTMENT (OUTPATIENT)
Dept: PRIMARY CARE | Facility: CLINIC | Age: 59
End: 2024-07-10
Payer: COMMERCIAL

## 2024-07-17 ENCOUNTER — OFFICE VISIT (OUTPATIENT)
Dept: PRIMARY CARE | Facility: CLINIC | Age: 59
End: 2024-07-17
Payer: COMMERCIAL

## 2024-07-17 VITALS
SYSTOLIC BLOOD PRESSURE: 170 MMHG | OXYGEN SATURATION: 97 % | DIASTOLIC BLOOD PRESSURE: 106 MMHG | WEIGHT: 293 LBS | HEART RATE: 81 BPM | BODY MASS INDEX: 49.2 KG/M2

## 2024-07-17 DIAGNOSIS — I87.2 CHRONIC VENOUS INSUFFICIENCY: ICD-10-CM

## 2024-07-17 DIAGNOSIS — R73.03 PREDIABETES: ICD-10-CM

## 2024-07-17 DIAGNOSIS — E03.8 OTHER SPECIFIED HYPOTHYROIDISM: ICD-10-CM

## 2024-07-17 DIAGNOSIS — F41.9 ANXIETY: ICD-10-CM

## 2024-07-17 DIAGNOSIS — S81.802D OPEN WOUND OF LEFT LOWER LEG, SUBSEQUENT ENCOUNTER: ICD-10-CM

## 2024-07-17 DIAGNOSIS — I87.2 VENOUS INSUFFICIENCY (CHRONIC) (PERIPHERAL): Primary | ICD-10-CM

## 2024-07-17 DIAGNOSIS — L97.308 VENOUS STASIS ULCER OF ANKLE WITH OTHER ULCER SEVERITY, UNSPECIFIED LATERALITY, UNSPECIFIED WHETHER VARICOSE VEINS PRESENT (MULTI): ICD-10-CM

## 2024-07-17 DIAGNOSIS — E78.2 MIXED HYPERLIPIDEMIA: ICD-10-CM

## 2024-07-17 DIAGNOSIS — I83.003 VENOUS STASIS ULCER OF ANKLE WITH OTHER ULCER SEVERITY, UNSPECIFIED LATERALITY, UNSPECIFIED WHETHER VARICOSE VEINS PRESENT (MULTI): ICD-10-CM

## 2024-07-17 PROCEDURE — 99214 OFFICE O/P EST MOD 30 MIN: CPT | Performed by: PHYSICIAN ASSISTANT

## 2024-07-17 PROCEDURE — 3080F DIAST BP >= 90 MM HG: CPT | Performed by: PHYSICIAN ASSISTANT

## 2024-07-17 PROCEDURE — 3077F SYST BP >= 140 MM HG: CPT | Performed by: PHYSICIAN ASSISTANT

## 2024-07-17 PROCEDURE — 1036F TOBACCO NON-USER: CPT | Performed by: PHYSICIAN ASSISTANT

## 2024-07-17 PROCEDURE — 87205 SMEAR GRAM STAIN: CPT | Mod: WESLAB | Performed by: PHYSICIAN ASSISTANT

## 2024-07-17 RX ORDER — FUROSEMIDE 20 MG/1
20 TABLET ORAL DAILY PRN
Qty: 30 TABLET | Refills: 3 | Status: SHIPPED | OUTPATIENT
Start: 2024-07-17 | End: 2025-07-17

## 2024-07-17 RX ORDER — ALPRAZOLAM 0.5 MG/1
0.5-1 TABLET ORAL 2 TIMES DAILY
Qty: 120 TABLET | Refills: 2 | Status: SHIPPED | OUTPATIENT
Start: 2024-07-27

## 2024-07-17 ASSESSMENT — COLUMBIA-SUICIDE SEVERITY RATING SCALE - C-SSRS
2. HAVE YOU ACTUALLY HAD ANY THOUGHTS OF KILLING YOURSELF?: NO
1. IN THE PAST MONTH, HAVE YOU WISHED YOU WERE DEAD OR WISHED YOU COULD GO TO SLEEP AND NOT WAKE UP?: NO
6. HAVE YOU EVER DONE ANYTHING, STARTED TO DO ANYTHING, OR PREPARED TO DO ANYTHING TO END YOUR LIFE?: NO

## 2024-07-17 ASSESSMENT — PAIN SCALES - GENERAL: PAINLEVEL: 10-WORST PAIN EVER

## 2024-07-17 NOTE — LETTER
7/17/24      To Whom It May Concern RE: Saba Hurtado       Due to her mobility constraints she is unable to transfer into/out of a car, thus requiring a van or SUV for transportation.     Please contact us with any questions.       Constantine Gomez PA-C

## 2024-07-17 NOTE — PROGRESS NOTES
Subjective   Patient ID: Mrs. Saba Hurtado is a 58 y.o. female who presents for Follow-up (Pt is here for follow up / nr).    Presents for follow up - states that she has had worsening swelling and wound drainage. Pain has increased as well with sharp shooting hot metal poker type pain.    Had improvement with frequent changes but now only able to change twice/day due to supplies. Bandages soaking after only a couple hours, will vary based on the day and has been unable to compress her leg due to backorder of compression wrap.   Using furosemide prn but needs refills.   Also requesting refill alprazolam to [facility] as current pharmacy is closing. UDS UTD and has been on this medication for years.            Review of Systems    Objective   BP (!) 170/106   Pulse 81   Wt 147 kg (323 lb 9.6 oz)   SpO2 97%   BMI 49.20 kg/m²     Physical Exam  Constitutional:       Appearance: She is obese.   Eyes:      Pupils: Pupils are equal, round, and reactive to light.   Cardiovascular:      Rate and Rhythm: Normal rate and regular rhythm.   Musculoskeletal:      Right lower leg: Edema present.      Left lower leg: Edema present.      Comments: Large open wound R anterior tibial region with seepage; dressings changed. LLE with blistering and skin breakdown   Neurological:      Mental Status: She is alert.     Leg wrapped with oil emulsion, Aquacel, Kerlix, gauze compression, ACE compression.     Assessment/Plan   Diagnoses and all orders for this visit:  Venous insufficiency (chronic) (peripheral)  Open wound of left lower leg, subsequent encounter  -     Tissue/Wound Culture/Smear  Venous stasis ulcer of ankle with other ulcer severity, unspecified laterality, unspecified whether varicose veins present (Multi)  -     furosemide (Lasix) 20 mg tablet; Take 1 tablet (20 mg) by mouth once daily as needed (edema).  Anxiety  -     ALPRAZolam (Xanax) 0.5 mg tablet; Take 1-2 tablets (0.5-1 mg) by mouth 2 times a day. Do  not fill before July 27, 2024.  Chronic venous insufficiency  -     furosemide (Lasix) 20 mg tablet; Take 1 tablet (20 mg) by mouth once daily as needed (edema).    Recommend changing wraps every 3 hours during the day. Maximize compression as much as possible.     Follow up 6 weeks.

## 2024-07-23 ENCOUNTER — PATIENT MESSAGE (OUTPATIENT)
Dept: PRIMARY CARE | Facility: CLINIC | Age: 59
End: 2024-07-23
Payer: COMMERCIAL

## 2024-07-23 DIAGNOSIS — S81.802D OPEN WOUND OF LEFT LOWER LEG, SUBSEQUENT ENCOUNTER: Primary | ICD-10-CM

## 2024-07-30 RX ORDER — CLINDAMYCIN HYDROCHLORIDE 300 MG/1
300 CAPSULE ORAL 3 TIMES DAILY
Qty: 30 CAPSULE | Refills: 0 | Status: SHIPPED | OUTPATIENT
Start: 2024-07-30 | End: 2024-08-09

## 2024-08-02 ENCOUNTER — TELEPHONE (OUTPATIENT)
Dept: PRIMARY CARE | Facility: CLINIC | Age: 59
End: 2024-08-02
Payer: COMMERCIAL

## 2024-08-02 NOTE — TELEPHONE ENCOUNTER
Vivian called stating they and the patient tried reaching out to [facility] with no success - they are requesting for provider's order be faxed over to [facility] -- their fax # is: [phone]    List of Medical Supplies:  >Requesting x400 quantity for all<    Conforming stretch gauze - 4in   ABD pads   Kerlix 4.5in   Nitrile gloves XL  Oil Emulsion dressing 3x16  Aquacel dressing 4x5  Ace wrap - velcro - 6in   Sterile guaze pads

## 2024-08-05 NOTE — TELEPHONE ENCOUNTER
Pt called back stating [facility] informed her that her medical supplies were in overage due to the high quantity amount - she states she needs this many due to multiple dressing changes needed for seepage for her leg ulcers/multiple wounds     She would like to add supplies:   Saline Solution   Gauze sponges 4x4     Pt is requesting a call back

## 2024-08-09 NOTE — TELEPHONE ENCOUNTER
Patient called in regards to the previous messages, she is requesting an update on the status of her medical supplies.   She is completely out of certain supplies and has been re-using some of her other supplies.

## 2024-08-20 NOTE — TELEPHONE ENCOUNTER
Spoke with patient and let her know that we received  a fax from [facility]. It stated we need measurements of the wounds before the insurance will cover the overage amount. I offered her a appointment but the patient did not want to make one at this time.

## 2024-08-22 ENCOUNTER — HOSPITAL ENCOUNTER (INPATIENT)
Facility: HOSPITAL | Age: 59
LOS: 2 days | Discharge: HOME | DRG: 603 | End: 2024-08-24
Attending: EMERGENCY MEDICINE | Admitting: INTERNAL MEDICINE
Payer: COMMERCIAL

## 2024-08-22 ENCOUNTER — APPOINTMENT (OUTPATIENT)
Dept: RADIOLOGY | Facility: HOSPITAL | Age: 59
DRG: 603 | End: 2024-08-22
Payer: COMMERCIAL

## 2024-08-22 DIAGNOSIS — I10 HYPERTENSION, UNSPECIFIED TYPE: ICD-10-CM

## 2024-08-22 DIAGNOSIS — L03.115 CELLULITIS OF RIGHT LOWER EXTREMITY: Primary | ICD-10-CM

## 2024-08-22 LAB
ALBUMIN SERPL-MCNC: 4.1 G/DL (ref 3.5–5)
ALP BLD-CCNC: 98 U/L (ref 35–125)
ALT SERPL-CCNC: 14 U/L (ref 5–40)
ANION GAP SERPL CALC-SCNC: 10 MMOL/L
APPEARANCE UR: CLEAR
AST SERPL-CCNC: 13 U/L (ref 5–40)
BASOPHILS # BLD AUTO: 0.06 X10*3/UL (ref 0–0.1)
BASOPHILS NFR BLD AUTO: 0.5 %
BILIRUB SERPL-MCNC: 0.5 MG/DL (ref 0.1–1.2)
BILIRUB UR STRIP.AUTO-MCNC: NEGATIVE MG/DL
BUN SERPL-MCNC: 15 MG/DL (ref 8–25)
CALCIUM SERPL-MCNC: 9.2 MG/DL (ref 8.5–10.4)
CHLORIDE SERPL-SCNC: 103 MMOL/L (ref 97–107)
CO2 SERPL-SCNC: 25 MMOL/L (ref 24–31)
COLOR UR: NORMAL
CREAT SERPL-MCNC: 0.7 MG/DL (ref 0.4–1.6)
CRP SERPL-MCNC: 1.4 MG/DL (ref 0–2)
EGFRCR SERPLBLD CKD-EPI 2021: >90 ML/MIN/1.73M*2
EOSINOPHIL # BLD AUTO: 0.25 X10*3/UL (ref 0–0.7)
EOSINOPHIL NFR BLD AUTO: 2.1 %
ERYTHROCYTE [DISTWIDTH] IN BLOOD BY AUTOMATED COUNT: 12.3 % (ref 11.5–14.5)
ERYTHROCYTE [SEDIMENTATION RATE] IN BLOOD BY WESTERGREN METHOD: 26 MM/H (ref 0–30)
EST. AVERAGE GLUCOSE BLD GHB EST-MCNC: 146 MG/DL
GLUCOSE BLD MANUAL STRIP-MCNC: 114 MG/DL (ref 74–99)
GLUCOSE BLD MANUAL STRIP-MCNC: 143 MG/DL (ref 74–99)
GLUCOSE SERPL-MCNC: 156 MG/DL (ref 65–99)
GLUCOSE UR STRIP.AUTO-MCNC: NORMAL MG/DL
HBA1C MFR BLD: 6.7 %
HCT VFR BLD AUTO: 44.1 % (ref 36–46)
HGB BLD-MCNC: 14.4 G/DL (ref 12–16)
IMM GRANULOCYTES # BLD AUTO: 0.1 X10*3/UL (ref 0–0.7)
IMM GRANULOCYTES NFR BLD AUTO: 0.8 % (ref 0–0.9)
KETONES UR STRIP.AUTO-MCNC: NEGATIVE MG/DL
LACTATE BLDV-SCNC: 1.7 MMOL/L (ref 0.4–2)
LEUKOCYTE ESTERASE UR QL STRIP.AUTO: NEGATIVE
LYMPHOCYTES # BLD AUTO: 2.3 X10*3/UL (ref 1.2–4.8)
LYMPHOCYTES NFR BLD AUTO: 19.4 %
MCH RBC QN AUTO: 28.8 PG (ref 26–34)
MCHC RBC AUTO-ENTMCNC: 32.7 G/DL (ref 32–36)
MCV RBC AUTO: 88 FL (ref 80–100)
MONOCYTES # BLD AUTO: 0.76 X10*3/UL (ref 0.1–1)
MONOCYTES NFR BLD AUTO: 6.4 %
MUCOUS THREADS #/AREA URNS AUTO: NORMAL /LPF
NEUTROPHILS # BLD AUTO: 8.4 X10*3/UL (ref 1.2–7.7)
NEUTROPHILS NFR BLD AUTO: 70.8 %
NITRITE UR QL STRIP.AUTO: NEGATIVE
NRBC BLD-RTO: 0 /100 WBCS (ref 0–0)
PH UR STRIP.AUTO: 6.5 [PH]
PLATELET # BLD AUTO: 313 X10*3/UL (ref 150–450)
POTASSIUM SERPL-SCNC: 3.8 MMOL/L (ref 3.4–5.1)
PROT SERPL-MCNC: 7.3 G/DL (ref 5.9–7.9)
PROT UR STRIP.AUTO-MCNC: NORMAL MG/DL
RBC # BLD AUTO: 5 X10*6/UL (ref 4–5.2)
RBC # UR STRIP.AUTO: NEGATIVE /UL
RBC #/AREA URNS AUTO: NORMAL /HPF
SODIUM SERPL-SCNC: 138 MMOL/L (ref 133–145)
SP GR UR STRIP.AUTO: 1.01
SQUAMOUS #/AREA URNS AUTO: NORMAL /HPF
UROBILINOGEN UR STRIP.AUTO-MCNC: NORMAL MG/DL
WBC # BLD AUTO: 11.9 X10*3/UL (ref 4.4–11.3)
WBC #/AREA URNS AUTO: NORMAL /HPF

## 2024-08-22 PROCEDURE — 2500000004 HC RX 250 GENERAL PHARMACY W/ HCPCS (ALT 636 FOR OP/ED): Performed by: EMERGENCY MEDICINE

## 2024-08-22 PROCEDURE — 87040 BLOOD CULTURE FOR BACTERIA: CPT | Mod: TRILAB | Performed by: PHYSICIAN ASSISTANT

## 2024-08-22 PROCEDURE — 81001 URINALYSIS AUTO W/SCOPE: CPT | Performed by: PHYSICIAN ASSISTANT

## 2024-08-22 PROCEDURE — G0378 HOSPITAL OBSERVATION PER HR: HCPCS

## 2024-08-22 PROCEDURE — 83605 ASSAY OF LACTIC ACID: CPT | Performed by: PHYSICIAN ASSISTANT

## 2024-08-22 PROCEDURE — 83036 HEMOGLOBIN GLYCOSYLATED A1C: CPT | Performed by: INTERNAL MEDICINE

## 2024-08-22 PROCEDURE — 71045 X-RAY EXAM CHEST 1 VIEW: CPT

## 2024-08-22 PROCEDURE — 36415 COLL VENOUS BLD VENIPUNCTURE: CPT | Performed by: PHYSICIAN ASSISTANT

## 2024-08-22 PROCEDURE — 73590 X-RAY EXAM OF LOWER LEG: CPT | Mod: RIGHT SIDE | Performed by: RADIOLOGY

## 2024-08-22 PROCEDURE — 82947 ASSAY GLUCOSE BLOOD QUANT: CPT

## 2024-08-22 PROCEDURE — 87070 CULTURE OTHR SPECIMN AEROBIC: CPT | Mod: TRILAB,WESLAB | Performed by: EMERGENCY MEDICINE

## 2024-08-22 PROCEDURE — 99223 1ST HOSP IP/OBS HIGH 75: CPT | Performed by: INTERNAL MEDICINE

## 2024-08-22 PROCEDURE — 2060000001 HC INTERMEDIATE ICU ROOM DAILY

## 2024-08-22 PROCEDURE — 2500000004 HC RX 250 GENERAL PHARMACY W/ HCPCS (ALT 636 FOR OP/ED): Performed by: INTERNAL MEDICINE

## 2024-08-22 PROCEDURE — 85652 RBC SED RATE AUTOMATED: CPT | Performed by: PHYSICIAN ASSISTANT

## 2024-08-22 PROCEDURE — 2500000004 HC RX 250 GENERAL PHARMACY W/ HCPCS (ALT 636 FOR OP/ED): Mod: JZ

## 2024-08-22 PROCEDURE — 99285 EMERGENCY DEPT VISIT HI MDM: CPT | Mod: 25

## 2024-08-22 PROCEDURE — 96375 TX/PRO/DX INJ NEW DRUG ADDON: CPT

## 2024-08-22 PROCEDURE — 96376 TX/PRO/DX INJ SAME DRUG ADON: CPT

## 2024-08-22 PROCEDURE — 2500000001 HC RX 250 WO HCPCS SELF ADMINISTERED DRUGS (ALT 637 FOR MEDICARE OP): Performed by: INTERNAL MEDICINE

## 2024-08-22 PROCEDURE — 73590 X-RAY EXAM OF LOWER LEG: CPT | Mod: RT

## 2024-08-22 PROCEDURE — 85025 COMPLETE CBC W/AUTO DIFF WBC: CPT | Performed by: PHYSICIAN ASSISTANT

## 2024-08-22 PROCEDURE — 2500000002 HC RX 250 W HCPCS SELF ADMINISTERED DRUGS (ALT 637 FOR MEDICARE OP, ALT 636 FOR OP/ED): Performed by: INTERNAL MEDICINE

## 2024-08-22 PROCEDURE — 80053 COMPREHEN METABOLIC PANEL: CPT | Performed by: PHYSICIAN ASSISTANT

## 2024-08-22 PROCEDURE — 96365 THER/PROPH/DIAG IV INF INIT: CPT

## 2024-08-22 PROCEDURE — 86140 C-REACTIVE PROTEIN: CPT | Performed by: PHYSICIAN ASSISTANT

## 2024-08-22 PROCEDURE — 71045 X-RAY EXAM CHEST 1 VIEW: CPT | Performed by: RADIOLOGY

## 2024-08-22 PROCEDURE — 2500000004 HC RX 250 GENERAL PHARMACY W/ HCPCS (ALT 636 FOR OP/ED): Performed by: PHYSICIAN ASSISTANT

## 2024-08-22 RX ORDER — LABETALOL HYDROCHLORIDE 5 MG/ML
20 INJECTION, SOLUTION INTRAVENOUS EVERY 6 HOURS PRN
Status: DISCONTINUED | OUTPATIENT
Start: 2024-08-22 | End: 2024-08-24 | Stop reason: HOSPADM

## 2024-08-22 RX ORDER — ONDANSETRON HYDROCHLORIDE 2 MG/ML
4 INJECTION, SOLUTION INTRAVENOUS ONCE
Status: DISCONTINUED | OUTPATIENT
Start: 2024-08-22 | End: 2024-08-24 | Stop reason: HOSPADM

## 2024-08-22 RX ORDER — IBUPROFEN 400 MG/1
800 TABLET ORAL EVERY 8 HOURS PRN
Status: DISCONTINUED | OUTPATIENT
Start: 2024-08-22 | End: 2024-08-22

## 2024-08-22 RX ORDER — FLUTICASONE PROPIONATE 50 MCG
2 SPRAY, SUSPENSION (ML) NASAL DAILY
Status: DISCONTINUED | OUTPATIENT
Start: 2024-08-22 | End: 2024-08-24 | Stop reason: HOSPADM

## 2024-08-22 RX ORDER — POLYETHYLENE GLYCOL 3350 17 G/17G
17 POWDER, FOR SOLUTION ORAL DAILY
Status: DISCONTINUED | OUTPATIENT
Start: 2024-08-22 | End: 2024-08-24 | Stop reason: HOSPADM

## 2024-08-22 RX ORDER — ALBUTEROL SULFATE 0.83 MG/ML
2.5 SOLUTION RESPIRATORY (INHALATION) EVERY 4 HOURS PRN
Status: DISCONTINUED | OUTPATIENT
Start: 2024-08-22 | End: 2024-08-24 | Stop reason: HOSPADM

## 2024-08-22 RX ORDER — ACETAMINOPHEN 325 MG/1
975 TABLET ORAL ONCE
Status: COMPLETED | OUTPATIENT
Start: 2024-08-22 | End: 2024-08-22

## 2024-08-22 RX ORDER — ENOXAPARIN SODIUM 100 MG/ML
40 INJECTION SUBCUTANEOUS EVERY 12 HOURS SCHEDULED
Status: DISCONTINUED | OUTPATIENT
Start: 2024-08-22 | End: 2024-08-24 | Stop reason: HOSPADM

## 2024-08-22 RX ORDER — ALPRAZOLAM 0.5 MG/1
1 TABLET ORAL 2 TIMES DAILY
Status: DISCONTINUED | OUTPATIENT
Start: 2024-08-22 | End: 2024-08-23

## 2024-08-22 RX ORDER — LABETALOL HYDROCHLORIDE 5 MG/ML
20 INJECTION, SOLUTION INTRAVENOUS ONCE
Status: DISCONTINUED | OUTPATIENT
Start: 2024-08-22 | End: 2024-08-22

## 2024-08-22 RX ORDER — HYDRALAZINE HYDROCHLORIDE 20 MG/ML
10 INJECTION INTRAMUSCULAR; INTRAVENOUS ONCE
Status: COMPLETED | OUTPATIENT
Start: 2024-08-22 | End: 2024-08-22

## 2024-08-22 RX ORDER — VANCOMYCIN HYDROCHLORIDE 1 G/20ML
INJECTION, POWDER, LYOPHILIZED, FOR SOLUTION INTRAVENOUS DAILY PRN
Status: DISCONTINUED | OUTPATIENT
Start: 2024-08-22 | End: 2024-08-24 | Stop reason: HOSPADM

## 2024-08-22 RX ORDER — ALPRAZOLAM 0.5 MG/1
1 TABLET ORAL 2 TIMES DAILY
Status: DISCONTINUED | OUTPATIENT
Start: 2024-08-22 | End: 2024-08-22

## 2024-08-22 RX ORDER — FUROSEMIDE 20 MG/1
20 TABLET ORAL DAILY PRN
Status: DISCONTINUED | OUTPATIENT
Start: 2024-08-22 | End: 2024-08-24 | Stop reason: HOSPADM

## 2024-08-22 RX ORDER — IBUPROFEN 400 MG/1
800 TABLET ORAL EVERY 8 HOURS PRN
Status: DISCONTINUED | OUTPATIENT
Start: 2024-08-22 | End: 2024-08-23

## 2024-08-22 RX ORDER — VANCOMYCIN 2 G/400ML
2 INJECTION, SOLUTION INTRAVENOUS ONCE
Status: COMPLETED | OUTPATIENT
Start: 2024-08-22 | End: 2024-08-22

## 2024-08-22 RX ORDER — VANCOMYCIN 2 G/400ML
2 INJECTION, SOLUTION INTRAVENOUS EVERY 12 HOURS
Status: DISCONTINUED | OUTPATIENT
Start: 2024-08-23 | End: 2024-08-24 | Stop reason: HOSPADM

## 2024-08-22 RX ORDER — PANTOPRAZOLE SODIUM 40 MG/1
40 TABLET, DELAYED RELEASE ORAL
Status: DISCONTINUED | OUTPATIENT
Start: 2024-08-23 | End: 2024-08-24 | Stop reason: HOSPADM

## 2024-08-22 RX ORDER — INSULIN LISPRO 100 [IU]/ML
0-5 INJECTION, SOLUTION INTRAVENOUS; SUBCUTANEOUS
Status: DISCONTINUED | OUTPATIENT
Start: 2024-08-22 | End: 2024-08-24 | Stop reason: HOSPADM

## 2024-08-22 RX ORDER — ALBUTEROL SULFATE 90 UG/1
2 INHALANT RESPIRATORY (INHALATION) EVERY 4 HOURS PRN
Status: DISCONTINUED | OUTPATIENT
Start: 2024-08-22 | End: 2024-08-22 | Stop reason: CLARIF

## 2024-08-22 RX ADMIN — ACETAMINOPHEN 975 MG: 325 TABLET ORAL at 11:23

## 2024-08-22 RX ADMIN — CEFEPIME 1 G: 1 INJECTION, POWDER, FOR SOLUTION INTRAMUSCULAR; INTRAVENOUS at 11:51

## 2024-08-22 RX ADMIN — SODIUM CHLORIDE 2000 ML: 900 INJECTION, SOLUTION INTRAVENOUS at 10:54

## 2024-08-22 RX ADMIN — HYDRALAZINE HYDROCHLORIDE 10 MG: 20 INJECTION INTRAMUSCULAR; INTRAVENOUS at 12:36

## 2024-08-22 RX ADMIN — ALPRAZOLAM 1 MG: 0.5 TABLET ORAL at 22:05

## 2024-08-22 RX ADMIN — IBUPROFEN 800 MG: 400 TABLET ORAL at 20:27

## 2024-08-22 RX ADMIN — VANCOMYCIN 2 G: 2 INJECTION, SOLUTION INTRAVENOUS at 11:24

## 2024-08-22 RX ADMIN — CEFEPIME 1 G: 1 INJECTION, POWDER, FOR SOLUTION INTRAMUSCULAR; INTRAVENOUS at 21:07

## 2024-08-22 RX ADMIN — FLUTICASONE PROPIONATE 2 SPRAY: 50 SPRAY, METERED NASAL at 17:28

## 2024-08-22 SDOH — SOCIAL STABILITY: SOCIAL INSECURITY: ARE YOU OR HAVE YOU BEEN THREATENED OR ABUSED PHYSICALLY, EMOTIONALLY, OR SEXUALLY BY ANYONE?: NO

## 2024-08-22 SDOH — SOCIAL STABILITY: SOCIAL INSECURITY: WERE YOU ABLE TO COMPLETE ALL THE BEHAVIORAL HEALTH SCREENINGS?: YES

## 2024-08-22 SDOH — SOCIAL STABILITY: SOCIAL INSECURITY: HAVE YOU HAD ANY THOUGHTS OF HARMING ANYONE ELSE?: NO

## 2024-08-22 SDOH — SOCIAL STABILITY: SOCIAL INSECURITY: ABUSE: ADULT

## 2024-08-22 SDOH — SOCIAL STABILITY: SOCIAL INSECURITY: DO YOU FEEL ANYONE HAS EXPLOITED OR TAKEN ADVANTAGE OF YOU FINANCIALLY OR OF YOUR PERSONAL PROPERTY?: NO

## 2024-08-22 SDOH — SOCIAL STABILITY: SOCIAL INSECURITY: DOES ANYONE TRY TO KEEP YOU FROM HAVING/CONTACTING OTHER FRIENDS OR DOING THINGS OUTSIDE YOUR HOME?: NO

## 2024-08-22 SDOH — SOCIAL STABILITY: SOCIAL INSECURITY: HAS ANYONE EVER THREATENED TO HURT YOUR FAMILY OR YOUR PETS?: NO

## 2024-08-22 SDOH — SOCIAL STABILITY: SOCIAL INSECURITY: HAVE YOU HAD THOUGHTS OF HARMING ANYONE ELSE?: NO

## 2024-08-22 SDOH — SOCIAL STABILITY: SOCIAL INSECURITY: DO YOU FEEL UNSAFE GOING BACK TO THE PLACE WHERE YOU ARE LIVING?: NO

## 2024-08-22 SDOH — SOCIAL STABILITY: SOCIAL INSECURITY: ARE THERE ANY APPARENT SIGNS OF INJURIES/BEHAVIORS THAT COULD BE RELATED TO ABUSE/NEGLECT?: NO

## 2024-08-22 ASSESSMENT — LIFESTYLE VARIABLES
AUDIT-C TOTAL SCORE: 0
SKIP TO QUESTIONS 9-10: 1
AUDIT-C TOTAL SCORE: 0
HOW OFTEN DO YOU HAVE A DRINK CONTAINING ALCOHOL: NEVER
HOW OFTEN DO YOU HAVE 6 OR MORE DRINKS ON ONE OCCASION: NEVER
HOW MANY STANDARD DRINKS CONTAINING ALCOHOL DO YOU HAVE ON A TYPICAL DAY: PATIENT DOES NOT DRINK

## 2024-08-22 ASSESSMENT — ACTIVITIES OF DAILY LIVING (ADL)
PATIENT'S MEMORY ADEQUATE TO SAFELY COMPLETE DAILY ACTIVITIES?: YES
DRESSING YOURSELF: INDEPENDENT
ASSISTIVE_DEVICE: WALKER
FEEDING YOURSELF: INDEPENDENT
TOILETING: INDEPENDENT
JUDGMENT_ADEQUATE_SAFELY_COMPLETE_DAILY_ACTIVITIES: YES
GROOMING: INDEPENDENT
BATHING: INDEPENDENT
HEARING - RIGHT EAR: FUNCTIONAL
HEARING - LEFT EAR: FUNCTIONAL
ADEQUATE_TO_COMPLETE_ADL: YES
LACK_OF_TRANSPORTATION: NO
WALKS IN HOME: INDEPENDENT

## 2024-08-22 ASSESSMENT — COGNITIVE AND FUNCTIONAL STATUS - GENERAL
DRESSING REGULAR LOWER BODY CLOTHING: A LITTLE
CLIMB 3 TO 5 STEPS WITH RAILING: A LITTLE
MOBILITY SCORE: 22
PATIENT BASELINE BEDBOUND: NO
WALKING IN HOSPITAL ROOM: A LITTLE
DAILY ACTIVITIY SCORE: 23

## 2024-08-22 ASSESSMENT — PAIN DESCRIPTION - PAIN TYPE
TYPE: CHRONIC PAIN
TYPE: ACUTE PAIN

## 2024-08-22 ASSESSMENT — PAIN SCALES - GENERAL
PAINLEVEL_OUTOF10: 4
PAINLEVEL_OUTOF10: 7

## 2024-08-22 ASSESSMENT — PATIENT HEALTH QUESTIONNAIRE - PHQ9
SUM OF ALL RESPONSES TO PHQ9 QUESTIONS 1 & 2: 0
1. LITTLE INTEREST OR PLEASURE IN DOING THINGS: NOT AT ALL
2. FEELING DOWN, DEPRESSED OR HOPELESS: NOT AT ALL

## 2024-08-22 ASSESSMENT — PAIN DESCRIPTION - ORIENTATION
ORIENTATION: RIGHT
ORIENTATION: RIGHT

## 2024-08-22 ASSESSMENT — PAIN - FUNCTIONAL ASSESSMENT
PAIN_FUNCTIONAL_ASSESSMENT: 0-10
PAIN_FUNCTIONAL_ASSESSMENT: 0-10

## 2024-08-22 ASSESSMENT — PAIN DESCRIPTION - LOCATION
LOCATION: LEG
LOCATION: LEG
LOCATION: BACK

## 2024-08-22 ASSESSMENT — PAIN DESCRIPTION - PROGRESSION: CLINICAL_PROGRESSION: GRADUALLY IMPROVING

## 2024-08-22 NOTE — H&P
"History Of Present Illness  Sbaa Hurtado \"Mrs. Saba Garcia" is a 58 y.o. female with past medical history of prediabetes, PETTY not on CPAP, chronic venous insufficiency, hypertension, morbid obesity, asthma, who came to hospital secondary to right leg pain and right leg wound.  Patient reports having a wound on her right leg for quite a while now and has been seen in wound clinic and more recently followed by her primary care provider for management of the wound.  She states she was placed on clindamycin for right leg cellulitis less than a week ago.  She reports having increased pain over the past 3 to 4 days of her right leg.  Patient has some chronic drainage of the leg as well.  She states that she was seen by vascular surgery in the past but has been a while.  She denies fever, chills, chest pain, shortness breath, nausea, vomiting, diarrhea, dysuria.  Patient reports her systolic blood pressure is typically 170s to 190s when she is seen at outpatient clinics, but at home SBP is 140s to 150s and she is being followed by her PCP who is aware of her blood pressure readings.  She is currently not taking any BP medications.  Otherwise, 10 point review of systems is benign.    Past Medical History  Past Medical History:   Diagnosis Date    Angina at rest (CMS-[facility])     Obstructive sleep apnea (adult) (pediatric)     Obstructive sleep apnea, adult    Other specified diabetes mellitus without complications (Multi)     Diabetes mellitus of other type without complication    Other specified diabetes mellitus without complications (Multi)     Diabetes mellitus of other type without complication    Personal history of other diseases of the circulatory system     History of hypertension    Personal history of other diseases of the circulatory system     History of angina    Personal history of other diseases of the circulatory system     History of hypertension    Personal history of other diseases of the nervous " system and sense organs     History of obstructive sleep apnea    Personal history of other diseases of the respiratory system     History of asthma    Personal history of other specified conditions     History of chest pain    Unspecified asthma with (acute) exacerbation ([facility])     Asthma with acute exacerbation, unspecified asthma severity, unspecified whether persistent       Surgical History  Past Surgical History:   Procedure Laterality Date    OTHER SURGICAL HISTORY  12/28/2021    Knee surgery    OTHER SURGICAL HISTORY  12/28/2021    Appendectomy    OTHER SURGICAL HISTORY  12/28/2021    Hysterectomy    OTHER SURGICAL HISTORY  12/28/2021    Tonsillectomy    OTHER SURGICAL HISTORY  12/28/2021    Hysterectomy    OTHER SURGICAL HISTORY  12/28/2021    Knee surgery    OTHER SURGICAL HISTORY  12/28/2021    Breast surgery    OTHER SURGICAL HISTORY  12/28/2021    Appendectomy    OTHER SURGICAL HISTORY  12/28/2021    Tonsillectomy        Social History  She reports that she has never smoked. She has never used smokeless tobacco. She reports that she does not drink alcohol and does not use drugs.    Family History  Family History   Problem Relation Name Age of Onset    Migraines Daughter          Allergies  Clarithromycin, Diazepam, Erythromycin, Lorazepam, Meperidine, Morphine, Norvasc [amlodipine], Oxycodone, Pertussis vaccines, Prednisone, Tramadol, Amoxicillin, Aspirin, Dicyclomine, Diphenoxylate-atropine, Doxycycline, Hydrocodone, Hydrocodone-acetaminophen, Propoxyphene-acetaminophen, and Pse-hydrocodone-pot guaiaco    Review of Systems   -As stated in the HPI.  Otherwise, 10 point review of systems is benign.  Physical Exam   General: Patient is alert.  No acute distress.  HEENT: Sclera clear.  CVS: RRR.   Lungs: CTAB.   Abdomen: Soft.  Nontender.  Bowel sounds present.  Extremities: Left lower extremity with 2-3+ edema.  Right lower extremity with large wound distal to the knee which is mildly erythematous and  "tender.  Patient does have some clear drainage around the wound and no evidence of necrotic/black tissue.  Psychiatric: Cooperative.  Last Recorded Vitals  Blood pressure (!) 187/93, pulse 91, temperature 37.3 °C (99.1 °F), temperature source Oral, resp. rate 18, height 1.753 m (5' 9\"), weight 141 kg (310 lb), SpO2 97%.    Relevant Results      XR chest 1 view    Result Date: 8/22/2024  Interpreted By:  Hugo James, STUDY: XR CHEST 1 VIEW;  8/22/2024 10:45 am   INDICATION: Signs/Symptoms:elevated BP.   COMPARISON: None.   ACCESSION NUMBER(S): [id number]   ORDERING CLINICIAN: BAN ALANIS   FINDINGS: CARDIOMEDIASTINAL SILHOUETTE AND VASCULATURE:   Cardiac size:  Within normal limits. Aortic shadow:  Within normal limits.   Mediastinal contours: Within normal limits.   Pulmonary vasculature:  The central vasculature is unremarkable   LUNGS: Lungs are clear.   ABDOMEN AND OTHER FINDINGS: No remarkable upper abdominal findings.   BONES: No acute osseous changes.       1.  No active cardiopulmonary disease.   Signed by: Hugo James 8/22/2024 11:21 AM Dictation workstation:   [id number]    XR tibia fibula right 2 views    Result Date: 8/22/2024  Interpreted By:  Hugo James, STUDY: XR TIBIA FIBULA RIGHT 2 VIEWS;  8/22/2024 10:45 am   INDICATION: Signs/Symptoms:right leg ulcer.   COMPARISON: None.   ACCESSION NUMBER(S): [id number]   ORDERING CLINICIAN: BAN ALANIS   FINDINGS: Bony structures:  Staples are present at the tibial apophysis, with medullary tunnel to the subchondral surface presumably from previous anterior cruciate repair. Bony structures otherwise appear intact without acute fractures.   Joint spaces:  Maintained   Soft tissues:  There is lacy subcutaneous calcification of the distal calf particularly posteriorly, which may be due to venous insufficiency, or possibly less likely etiologies such as dermatomyositis.   Other:  None significant       No acute findings.   MACRO: None   Signed by: " Hugo Andrewsfannymadi 8/22/2024 11:21 AM Dictation workstation:   [id number]      Results for orders placed or performed during the hospital encounter of 08/22/24 (from the past 24 hour(s))   CBC and Auto Differential   Result Value Ref Range    WBC 11.9 (H) 4.4 - 11.3 x10*3/uL    nRBC 0.0 0.0 - 0.0 /100 WBCs    RBC 5.00 4.00 - 5.20 x10*6/uL    Hemoglobin 14.4 12.0 - 16.0 g/dL    Hematocrit 44.1 36.0 - 46.0 %    MCV 88 80 - 100 fL    MCH 28.8 26.0 - 34.0 pg    MCHC 32.7 32.0 - 36.0 g/dL    RDW 12.3 11.5 - 14.5 %    Platelets 313 150 - 450 x10*3/uL    Neutrophils % 70.8 40.0 - 80.0 %    Immature Granulocytes %, Automated 0.8 0.0 - 0.9 %    Lymphocytes % 19.4 13.0 - 44.0 %    Monocytes % 6.4 2.0 - 10.0 %    Eosinophils % 2.1 0.0 - 6.0 %    Basophils % 0.5 0.0 - 2.0 %    Neutrophils Absolute 8.40 (H) 1.20 - 7.70 x10*3/uL    Immature Granulocytes Absolute, Automated 0.10 0.00 - 0.70 x10*3/uL    Lymphocytes Absolute 2.30 1.20 - 4.80 x10*3/uL    Monocytes Absolute 0.76 0.10 - 1.00 x10*3/uL    Eosinophils Absolute 0.25 0.00 - 0.70 x10*3/uL    Basophils Absolute 0.06 0.00 - 0.10 x10*3/uL   Comprehensive metabolic panel   Result Value Ref Range    Glucose 156 (H) 65 - 99 mg/dL    Sodium 138 133 - 145 mmol/L    Potassium 3.8 3.4 - 5.1 mmol/L    Chloride 103 97 - 107 mmol/L    Bicarbonate 25 24 - 31 mmol/L    Urea Nitrogen      Creatinine 0.70 0.40 - 1.60 mg/dL    eGFR >90 >60 mL/min/1.73m*2    Calcium 9.2 8.5 - 10.4 mg/dL    Albumin 4.1 3.5 - 5.0 g/dL    Alkaline Phosphatase 98 35 - 125 U/L    Total Protein 7.3 5.9 - 7.9 g/dL    AST 13 5 - 40 U/L    Bilirubin, Total 0.5 0.1 - 1.2 mg/dL    ALT 14 5 - 40 U/L    Anion Gap 10 <=19 mmol/L   Sedimentation rate, automated   Result Value Ref Range    Sedimentation Rate 26 0 - 30 mm/h   C-reactive protein   Result Value Ref Range    C-Reactive Protein 1.40 0.00 - 2.00 mg/dL   Blood Gas Lactic Acid, Venous   Result Value Ref Range    POCT Lactate, Venous 1.7 0.4 - 2.0 mmol/L   Urinalysis with  "Reflex Culture and Microscopic   Result Value Ref Range    Color, Urine Light-Yellow Light-Yellow, Yellow, Dark-Yellow    Appearance, Urine Clear Clear    Specific Gravity, Urine 1.014 1.005 - 1.035    pH, Urine 6.5 5.0, 5.5, 6.0, 6.5, 7.0, 7.5, 8.0    Protein, Urine 10 (TRACE) NEGATIVE, 10 (TRACE), 20 (TRACE) mg/dL    Glucose, Urine Normal Normal mg/dL    Blood, Urine NEGATIVE NEGATIVE    Ketones, Urine NEGATIVE NEGATIVE mg/dL    Bilirubin, Urine NEGATIVE NEGATIVE    Urobilinogen, Urine Normal Normal mg/dL    Nitrite, Urine NEGATIVE NEGATIVE    Leukocyte Esterase, Urine NEGATIVE NEGATIVE   Urinalysis Microscopic   Result Value Ref Range    WBC, Urine NONE 1-5, NONE /HPF    RBC, Urine 1-2 NONE, 1-2, 3-5 /HPF    Squamous Epithelial Cells, Urine 1-9 (SPARSE) Reference range not established. /HPF    Mucus, Urine FEW Reference range not established. /LPF         Assessment/Plan   Assessment & Plan  Cellulitis of right lower extremity      Saba Hurtado \"Mrs. Saba Hurtado\" is a 58 y.o. female with past medical history of prediabetes, PETTY not on CPAP, chronic venous insufficiency, hypertension, morbid obesity, asthma, who is being admitted to the hospital secondary to right leg cellulitis and accelerated hypertension.    Right leg cellulitis with right leg wound  -Patient with chronic venous insufficiency and chronic right leg wound which appears to be more painful than in the past.  No evidence of necrotic tissue.  X-ray of the right tib-fib with no acute findings.  -Will continue on cefepime, vancomycin, which was started in the ER.  -Follow the blood cultures.  -Consult ID, wound care nurse.  -Consult vascular surgery given patient's venous insufficiency and chronic wound and with acute cellulitis.  -Patient's  is at the bedside and all of their questions were answered.    Accelerated hypertension  -Patient reports SBP is typically 170s to 190s in outpatient clinics but typically SBP is 140s to 150s at " home and is not taking BP medications as she states that BP medications have dropped her blood pressure at home in the past.  -Patient was given hydralazine 10 mg IV in the ER and BP is still elevated but has improved.  Patient also having acute pain, which may be contributing.  We will treat underlying pain see if this helps blood pressure and can give labetalol as needed as well and monitor.   -Monitor.     Prediabetes  -Diabetes is listed in the chart and patient reports having history of prediabetes.  -Check HbA1c and placed on SSI and monitor.    Morbid obesity  -Diet and exercise recommended.    Asthma  -Appears to be stable at this time.  Can give albuterol as needed and monitor.    PETTY  -PETTY as listed in the chart and patient denies using CPAP at home.  Recommend follow-up with PCP and sleep medicine as an outpatient.    DVT prophylaxis  -Lovenox subcu.          Rudy Chaney, DO

## 2024-08-22 NOTE — PROGRESS NOTES
Attestation/Supervisory note for EZ Freeman      The patient is a 58-year-old female presenting to the emergency department for evaluation of a worsening infection of her right lower leg.  The patient reportedly has a long history of recurrent infections of her right lower extremity.  She states that she has been seen by wound care in the past but she did not feel it was helpful so she decided to try to take care of it at home by herself.  She states that she did see her primary care physician about 3 weeks ago and was prescribed clindamycin.  She may be still taking the clindamycin.  She states that the wound has been gradually worsening.  She states it is painful all of the time.  It is worse when she is standing and walking.  No other better or worse.  She states that she is a prediabetic.  She denies any recent injury or trauma.  She denies any headache or visual changes.  No chest pain or shortness of breath.  No abdominal pain.  No nausea vomiting.  No diarrhea or constipation.  No urinary complaints.  She denies any recent injury or trauma.  All pertinent positives and negatives are recorded above.  All other systems reviewed and otherwise negative.  Vital signs with hypertension but otherwise within normal limits.  Physical exam with a well-nourished well-developed female with obesity but no evidence of acute distress.  HEENT exam within normal limits.  She has no evidence of airway compromise or respiratory distress.  Abdominal exam is benign.  She does not have any gross motor, neurologic or vascular deficits on exam.  She does have bilateral lower extremity pitting edema.  She does have a diffuse superficial wound on the right lower leg with ulceration on the medial posterior aspect of the right lower leg.  Pulses are equal bilaterally.      IV fluids and oral acetaminophen ordered.  The patient reports an allergy to multiple medications including antibiotics, opioids, and NSAIDs      Cultures were  obtained and IV antibiotics were ordered      Diagnostic labs without significant abnormality.      UA pending at the time of admission      Lactic acid of 1.7      XR tibia fibula right 2 views   Final Result   No acute findings.        MACRO:   None        Signed by: Hugo James 8/22/2024 11:21 AM   Dictation workstation:   [id number]      XR chest 1 view   Final Result   1.  No active cardiopulmonary disease.        Signed by: Hugo James 8/22/2024 11:21 AM   Dictation workstation:   [id number]           The patient does not have any evidence of sepsis.  She was hypertensive but did not have any evidence of hypertensive urgency/emergency and/or crisis.  The patient was started on IV antibiotics for treatment of her acute on chronic cellulitis and wounds of the right lower extremity.  No evidence of osteomyelitis or fracture on x-ray of the right lower extremity.  No evidence of acute process on chest x-ray.  No evidence of pneumonia or pneumothorax.  No evidence of CHF.  No widening of the mediastinum.  Her pulses are equal bilaterally.      The patient was admitted for further management of her cellulitis and wounds of the right lower extremity    Impression/diagnosis:  1.  Right lower extremity cellulitis and wounds, acute on chronic  2.  Hypertension, unspecified      Critical care time billing is not warranted at this time      I personally saw the patient and made/approve the management plan and take responsibility for the patient management.      I independently interpreted the following study (S) diagnostic labs      I personally discussed the patient's management with the patient      I reviewed the results of the diagnostic labs and diagnostic imaging.  Formal radiology read was completed by the radiologist.      Anuradha Chamberlain MD

## 2024-08-22 NOTE — PROGRESS NOTES
"Pharmacy Medication History Review    Saba Hurtado \"Mrs. Saba Hurtado\" is a 58 y.o. female admitted for Wound Infection. Pharmacy reviewed the patient's [id number] medications and allergies for accuracy.    The list below reflectives the updated PTA list. Please review each medication in order reconciliation for additional clarification and justification.  Prior to Admission Medications   Prescriptions Last Dose Informant Patient Reported? Taking?   ALPRAZolam (Xanax) 0.5 mg tablet 2024 at am  No Yes   Sig: Take 1-2 tablets (0.5-1 mg) by mouth 2 times a day. Do not fill before 2024.   Flonase Allergy Relief 50 mcg/actuation nasal spray 2024 at am  No Yes   Sig: Administer 2 sprays into each nostril once daily.   FreeStyle Test strip   Yes No   Sig: USE AS DIRECTED.   OneTouch Delica Plus Lancet 30 gauge misc   No No   Si Units by in vitro route 2 times a day.  USE AS DIRECTED   OneTouch Ultra Control solution   No No   Si Units by in vitro route if needed (control). AS DIRECTED IN VITRO DAILY AS NEEDEDAS DIRECTED IN VITRO DAILY AS NEEDED   ProAir HFA 90 mcg/actuation inhaler   No No   Sig: Inhale 2 puffs every 6 hours if needed for wheezing. USE AS DIRECTED   Vivelle-Dot 0.1 mg/24 hr patch 2024  No Yes   Sig: Place 1 patch on the skin 2 times a week.   albuterol (Ventolin HFA) 90 mcg/actuation inhaler Past Month  No Yes   Sig: Inhale 2 puffs every 4 hours if needed for wheezing or shortness of breath.   blood pressure test kit-wrist kit   No No   Sig: OMRON 7 Series wrist cuff - tests BP as needed   blood-glucose meter (OneTouch Ultra2 Meter) misc   No No   Sig: AS DIRECTED   elastic bandage (Coban) 4 X 5 \"-yard bandage   No No   Sig: Apply 1 Units topically once daily.   furosemide (Lasix) 20 mg tablet   No No   Sig: Take 1 tablet (20 mg) by mouth once daily as needed (edema).   gauze bandage (Kerlix) 4 1/2 X 147 \" bandage   No No   Sig: Apply 1 Units topically once " "daily.   hydrocortisone 1 % cream Past Week  No Yes   Si APPLICATION EXTERNALLY TWICE A DAY 30 DAYS   ibuprofen 800 mg tablet 2024 at am  No Yes   Sig: TAKE 1 TABLET BY MOUTH THREE TIMES A DAY WITH FOOD OR MILK AS NEEDED   ketoconazole (NIZOral) 2 % cream Past Week  No Yes   Sig: Apply topically 2 times a day.   ketoconazole (NIZOral) 2 % shampoo Past Week  No Yes   Sig: Apply 1 Application topically 2 times a day.   lancets 30 gauge misc   Yes No   Sig: once daily. CHECK ONCE DAILY   mupirocin (Bactroban) 2 % ointment Past Week  No Yes   Sig: APPLY TO AFFECTED AREA TWICE DAILY UNTIL HEALED   non-adher band-sunflowr-petrol (Oil Emulsion Dressing) 3 X 8 \" bandage   No No   Sig: Apply 1 Units topically once daily.   omeprazole (PriLOSEC) 20 mg DR capsule Past Month  No Yes   Sig: TAKE 1 CAPSULE BY MOUTH 30 MINUTES BEFORE MORNING MEAL ONCE A DAY FOR 90 DAY(S)   sodium chloride (Saline NasaL) 0.65 % nasal spray 2024 at am  No Yes   Sig: Administer 2 sprays into each nostril once daily as needed for congestion.      Facility-Administered Medications: None          The list below reflectives the updated allergy list. Please review each documented allergy for additional clarification and justification.  Allergies  Reviewed by Ruth Melo CPhT on 2024        Severity Reactions Comments    Clarithromycin High Anaphylaxis, Unknown     Diazepam High Anaphylaxis     Erythromycin High Anaphylaxis     Lorazepam High Anaphylaxis, Unknown     Meperidine High Anaphylaxis     Morphine High Anaphylaxis     Norvasc [amlodipine] High Other, Cardiac arrhythmia/arrest     Oxycodone High Anaphylaxis, Unknown     Pertussis Vaccines High Anaphylaxis     Prednisone High Anaphylaxis, Unknown     Tramadol High Anaphylaxis     Amoxicillin Low Unknown     Aspirin Low Unknown     Dicyclomine Low Unknown     Diphenoxylate-atropine Low Unknown     Doxycycline Low Unknown     Hydrocodone Low Unknown     Hydrocodone-acetaminophen " Low Unknown     Propoxyphene-acetaminophen Low Unknown     Pse-hydrocodone-pot Guaiaco Low Unknown             Below are additional concerns with the patient's PTA list.  - pt states she can only use the brand name Viville-Dot 0.1 mg/24 hour patch, other brands/generics create a bad skin rash.     EBONIE MCGREGOR CPhT

## 2024-08-22 NOTE — ED PROVIDER NOTES
"HPI   Chief Complaint   Patient presents with    Wound Infection    Hypertension     Patient bib EMS from [place] urgent care for hypertension and a wound infection of R lower extremity, positive for staff in wound, been taking clindamycin. Wound since January        Is a 58-year-old female presenting to the emergency room with complaints of worsening right lower extremity ulcer.  Patient reportedly was seen by her primary care doctor for this ulcer and has been seen by wound care prior but recently has been caring for the wound on her own at home.  She is currently on clindamycin.  Patient went to urgent care today because she has had increasing pain in the right lower extremity and reports worsening of the ulcer.  Urgent care sent her to the emergency department for further evaluation.  Patient does have a history of \"prediabetes \"and high blood pressure however she is currently not taking anything for her blood pressure.        Please see HPI for pertinent positive and negative ROS.      Patient History   Past Medical History:   Diagnosis Date    Angina at rest (CMS-[facility])     Obstructive sleep apnea (adult) (pediatric)     Obstructive sleep apnea, adult    Other specified diabetes mellitus without complications (Multi)     Diabetes mellitus of other type without complication    Other specified diabetes mellitus without complications (Multi)     Diabetes mellitus of other type without complication    Personal history of other diseases of the circulatory system     History of hypertension    Personal history of other diseases of the circulatory system     History of angina    Personal history of other diseases of the circulatory system     History of hypertension    Personal history of other diseases of the nervous system and sense organs     History of obstructive sleep apnea    Personal history of other diseases of the respiratory system     History of asthma    Personal history of other specified conditions     " History of chest pain    Unspecified asthma with (acute) exacerbation ([facility]-H[facility])     Asthma with acute exacerbation, unspecified asthma severity, unspecified whether persistent     Past Surgical History:   Procedure Laterality Date    OTHER SURGICAL HISTORY  12/28/2021    Knee surgery    OTHER SURGICAL HISTORY  12/28/2021    Appendectomy    OTHER SURGICAL HISTORY  12/28/2021    Hysterectomy    OTHER SURGICAL HISTORY  12/28/2021    Tonsillectomy    OTHER SURGICAL HISTORY  12/28/2021    Hysterectomy    OTHER SURGICAL HISTORY  12/28/2021    Knee surgery    OTHER SURGICAL HISTORY  12/28/2021    Breast surgery    OTHER SURGICAL HISTORY  12/28/2021    Appendectomy    OTHER SURGICAL HISTORY  12/28/2021    Tonsillectomy     Family History   Problem Relation Name Age of Onset    Migraines Daughter       Social History     Tobacco Use    Smoking status: Never    Smokeless tobacco: Never   Substance Use Topics    Alcohol use: Never    Drug use: Never       Physical Exam   ED Triage Vitals [08/22/24 0959]   Temperature Heart Rate Respirations BP   37.3 °C (99.1 °F) 90 18 (!) 220/115      Pulse Ox Temp Source Heart Rate Source Patient Position   97 % Temporal -- --      BP Location FiO2 (%)     -- --       Physical Exam  GENERAL APPEARANCE: Awake and alert. No acute respiratory distress.   VITAL SIGNS: As per the nurses' triage record.  HEENT: Normocephalic, atraumatic.   NECK: Soft, nontender and supple  CHEST: Nontender to palpation. Clear to auscultation bilaterally. Symmetric rise and fall of chest wall.   HEART: Clear S1 and S2. Regular rate and rhythm.  Strong and equal pulses in the extremities.  ABDOMEN: Soft, nontender, nondistended  MUSCULOSKELETAL: The calves are nontender to palpation. Full gross active range of motion. Ambulating on own with no acute difficulties  NEUROLOGICAL: Awake, alert and oriented x 3.  Patient answering questions appropriately.   IMMUNOLOGICAL: No lymphatic streaking noted  DERMATOLOGIC: Warm  and dry  PYSCH: Cooperative with appropriate mood and affect.    ED Course & MDM   Diagnoses as of 08/22/24 1234   Cellulitis of right lower extremity   Hypertension, unspecified type                 No data recorded     Braxton Coma Scale Score: 15 (08/22/24 1001 : Liliane Han RN)                           Medical Decision Making  Parts of this chart have been completed using voice recognition software. Please excuse any errors of transcription.  My thought process and reason for plan has been formulated from the time that I saw the patient until the time of disposition and is not specific to one specific moment during their visit and furthermore my MDM encompasses this entire chart and not only this text box.      HPI: Detailed above.    Exam: A medically appropriate exam performed, outlined above, given the known history and presentation.    History obtained from: Patient    Medications given during visit:  Medications   vancomycin (Vancocin) pharmacy to dose - pharmacy monitoring (has no administration in time range)   cefepime (Maxipime) in dextrose 5% 50 mL IV 1 g (1 g intravenous New Bag 8/22/24 1151)   ondansetron (Zofran) injection 4 mg (4 mg intravenous Not Given 8/22/24 1123)   vancomycin (Xellia) 2 g in diluent combination  mL (0 g intravenous Paused 8/22/24 1151)   hydrALAZINE (Apresoline) injection 10 mg (has no administration in time range)   sodium chloride 0.9 % bolus 1,986 mL (2,000 mL intravenous New Bag 8/22/24 1054)   acetaminophen (Tylenol) tablet 975 mg (975 mg oral Given 8/22/24 1123)        Diagnostic/tests  Labs Reviewed   CBC WITH AUTO DIFFERENTIAL - Abnormal       Result Value    WBC 11.9 (*)     nRBC 0.0      RBC 5.00      Hemoglobin 14.4      Hematocrit 44.1      MCV 88      MCH 28.8      MCHC 32.7      RDW 12.3      Platelets 313      Neutrophils % 70.8      Immature Granulocytes %, Automated 0.8      Lymphocytes % 19.4      Monocytes % 6.4      Eosinophils % 2.1       Basophils % 0.5      Neutrophils Absolute 8.40 (*)     Immature Granulocytes Absolute, Automated 0.10      Lymphocytes Absolute 2.30      Monocytes Absolute 0.76      Eosinophils Absolute 0.25      Basophils Absolute 0.06     COMPREHENSIVE METABOLIC PANEL - Abnormal    Glucose 156 (*)     Sodium 138      Potassium 3.8      Chloride 103      Bicarbonate 25      Urea Nitrogen        Creatinine 0.70      eGFR >90      Calcium 9.2      Albumin 4.1      Alkaline Phosphatase 98      Total Protein 7.3      AST 13      Bilirubin, Total 0.5      ALT 14      Anion Gap 10     C-REACTIVE PROTEIN - Normal    C-Reactive Protein 1.40     BLOOD GAS LACTIC ACID, VENOUS - Normal    POCT Lactate, Venous 1.7     BLOOD CULTURE   BLOOD CULTURE   TISSUE/WOUND CULTURE/SMEAR   SEDIMENTATION RATE, AUTOMATED   URINALYSIS WITH REFLEX CULTURE AND MICROSCOPIC    Narrative:     The following orders were created for panel order Urinalysis with Reflex Culture and Microscopic.  Procedure                               Abnormality         Status                     ---------                               -----------         ------                     Urinalysis with Reflex C...[[id number]]                      In process                 Extra Urine Gray Tube[[id number]]                                                         Please view results for these tests on the individual orders.   URINALYSIS WITH REFLEX CULTURE AND MICROSCOPIC   EXTRA URINE GRAY TUBE   URINALYSIS MICROSCOPIC WITH REFLEX CULTURE      XR tibia fibula right 2 views   Final Result   No acute findings.        MACRO:   None        Signed by: Hugo James 8/22/2024 11:21 AM   Dictation workstation:   [id number]      XR chest 1 view   Final Result   1.  No active cardiopulmonary disease.        Signed by: Hugo James 8/22/2024 11:21 AM   Dictation workstation:   [id number]           Considerations/further MDM:  Patient was seen in conjucntion with my supervising physician,    Bulmaro. Please refer to her note.    Differential diagnosis includes was not limited to cellulitis versus osteomyelitis versus necrotizing fasciitis versus compartment syndrome    X-ray of right tibia-fibula showed no acute findings.  CBC with mild leukocytosis at 11.9.  Lactic acid 1.7.  Urinalysis, and tissue culture is in process.    Patient was treated with oral Tylenol, IV cefepime, IV vancomycin and IV normal saline 30 mL/kg fluid bolus.  Due to persistent elevated blood pressure, hydralazine 10 mg IV was ordered.    The patient was evaluated in the emergency department and an etiology requiring emergent treatment or admission to hospital was identified.  The patient/family was counseled on clinical expression, expectations, and plan along with recommendations for admission.  All questions were answered and involved parties were understanding and agreeable to course of treatment.  Case was discussed with admitting, Dr. Chaney.  Bed type ED treatment and further ED work-up decided by joint decision making with admitting team and any consultants.  Patient stable for admission per my assessment and further management of patient will be deferred to the inpatient setting.  Procedure  Procedures     Davina Freeman PA-C  08/22/24 8787

## 2024-08-22 NOTE — NURSING NOTE
IMPORTANT - pt has home xanax and ibuprofen in [facility], they have been verified by pharmacy. Xanax in locked pt medication bin OK to use home flonase/saline nasal spray per Dr Chaney.

## 2024-08-22 NOTE — PROGRESS NOTES
Vancomycin Dosing by Pharmacy- EMERGENCY DEPARTMENT    Saba Hurtado is a 58 y.o. year old female who Pharmacy has been consulted to give a ONE TIME ONLY vancomycin dose in the Emergency Department for cellulitis, skin and soft tissue.     Visit Vitals  BP (!) 220/115   Pulse 90   Temp 37.3 °C (99.1 °F) (Temporal)   Resp 18        Lab Results   Component Value Date    CREATININE 0.8 04/04/2023    CREATININE 0.7 05/25/2022    CREATININE 0.8 10/21/2021    CREATININE 0.7 04/28/2021        Patient weight is   Wt Readings from Last 1 Encounters:   08/22/24 141 kg (310 lb)        Assessment/Plan     Patient will be given a one time dose of 2000mg  Pharmacy will sign off at this time. If vancomycin is to be continued, please re-consult Pharmacy.       ANYA GALE, PharmD

## 2024-08-23 LAB
ALBUMIN SERPL-MCNC: 4 G/DL (ref 3.5–5)
ANION GAP SERPL CALC-SCNC: 16 MMOL/L
BUN SERPL-MCNC: 17 MG/DL (ref 8–25)
CALCIUM SERPL-MCNC: 9.3 MG/DL (ref 8.5–10.4)
CHLORIDE SERPL-SCNC: 107 MMOL/L (ref 97–107)
CO2 SERPL-SCNC: 18 MMOL/L (ref 24–31)
CREAT SERPL-MCNC: 0.7 MG/DL (ref 0.4–1.6)
EGFRCR SERPLBLD CKD-EPI 2021: >90 ML/MIN/1.73M*2
ERYTHROCYTE [DISTWIDTH] IN BLOOD BY AUTOMATED COUNT: 12.5 % (ref 11.5–14.5)
GLUCOSE BLD MANUAL STRIP-MCNC: 123 MG/DL (ref 74–99)
GLUCOSE BLD MANUAL STRIP-MCNC: 137 MG/DL (ref 74–99)
GLUCOSE BLD MANUAL STRIP-MCNC: 140 MG/DL (ref 74–99)
GLUCOSE BLD MANUAL STRIP-MCNC: 97 MG/DL (ref 74–99)
GLUCOSE SERPL-MCNC: 129 MG/DL (ref 65–99)
HCT VFR BLD AUTO: 43.5 % (ref 36–46)
HGB BLD-MCNC: 14.5 G/DL (ref 12–16)
MAGNESIUM SERPL-MCNC: 2.2 MG/DL (ref 1.6–3.1)
MCH RBC QN AUTO: 29.2 PG (ref 26–34)
MCHC RBC AUTO-ENTMCNC: 33.3 G/DL (ref 32–36)
MCV RBC AUTO: 88 FL (ref 80–100)
NRBC BLD-RTO: 0 /100 WBCS (ref 0–0)
PHOSPHATE SERPL-MCNC: 3.2 MG/DL (ref 2.5–4.5)
PLATELET # BLD AUTO: 309 X10*3/UL (ref 150–450)
POTASSIUM SERPL-SCNC: 4 MMOL/L (ref 3.4–5.1)
RBC # BLD AUTO: 4.96 X10*6/UL (ref 4–5.2)
SODIUM SERPL-SCNC: 141 MMOL/L (ref 133–145)
WBC # BLD AUTO: 12.5 X10*3/UL (ref 4.4–11.3)

## 2024-08-23 PROCEDURE — 99223 1ST HOSP IP/OBS HIGH 75: CPT | Performed by: NURSE PRACTITIONER

## 2024-08-23 PROCEDURE — 80069 RENAL FUNCTION PANEL: CPT | Performed by: INTERNAL MEDICINE

## 2024-08-23 PROCEDURE — 2500000001 HC RX 250 WO HCPCS SELF ADMINISTERED DRUGS (ALT 637 FOR MEDICARE OP): Performed by: INTERNAL MEDICINE

## 2024-08-23 PROCEDURE — 82947 ASSAY GLUCOSE BLOOD QUANT: CPT

## 2024-08-23 PROCEDURE — 2500000004 HC RX 250 GENERAL PHARMACY W/ HCPCS (ALT 636 FOR OP/ED): Mod: JZ | Performed by: INTERNAL MEDICINE

## 2024-08-23 PROCEDURE — 2500000004 HC RX 250 GENERAL PHARMACY W/ HCPCS (ALT 636 FOR OP/ED): Performed by: INTERNAL MEDICINE

## 2024-08-23 PROCEDURE — 99232 SBSQ HOSP IP/OBS MODERATE 35: CPT | Performed by: INTERNAL MEDICINE

## 2024-08-23 PROCEDURE — 83735 ASSAY OF MAGNESIUM: CPT | Performed by: INTERNAL MEDICINE

## 2024-08-23 PROCEDURE — G0378 HOSPITAL OBSERVATION PER HR: HCPCS

## 2024-08-23 PROCEDURE — 36415 COLL VENOUS BLD VENIPUNCTURE: CPT | Performed by: INTERNAL MEDICINE

## 2024-08-23 PROCEDURE — 2500000004 HC RX 250 GENERAL PHARMACY W/ HCPCS (ALT 636 FOR OP/ED): Mod: JZ | Performed by: PHYSICIAN ASSISTANT

## 2024-08-23 PROCEDURE — 85027 COMPLETE CBC AUTOMATED: CPT | Performed by: INTERNAL MEDICINE

## 2024-08-23 PROCEDURE — 2060000001 HC INTERMEDIATE ICU ROOM DAILY

## 2024-08-23 RX ORDER — IBUPROFEN 400 MG/1
800 TABLET ORAL EVERY 8 HOURS PRN
Status: DISCONTINUED | OUTPATIENT
Start: 2024-08-23 | End: 2024-08-24 | Stop reason: HOSPADM

## 2024-08-23 RX ORDER — CEFEPIME HYDROCHLORIDE 1 G/50ML
1 INJECTION, SOLUTION INTRAVENOUS EVERY 8 HOURS
Status: DISCONTINUED | OUTPATIENT
Start: 2024-08-23 | End: 2024-08-24 | Stop reason: HOSPADM

## 2024-08-23 RX ORDER — ALPRAZOLAM 0.5 MG/1
1 TABLET ORAL 2 TIMES DAILY PRN
Status: DISCONTINUED | OUTPATIENT
Start: 2024-08-23 | End: 2024-08-24 | Stop reason: HOSPADM

## 2024-08-23 RX ADMIN — IBUPROFEN 800 MG: 400 TABLET ORAL at 04:34

## 2024-08-23 RX ADMIN — VANCOMYCIN 2 G: 2 INJECTION, SOLUTION INTRAVENOUS at 15:46

## 2024-08-23 RX ADMIN — Medication 2 SPRAY: at 14:21

## 2024-08-23 RX ADMIN — CEFEPIME 1 G: 1 INJECTION, POWDER, FOR SOLUTION INTRAMUSCULAR; INTRAVENOUS at 14:21

## 2024-08-23 RX ADMIN — CEFEPIME HYDROCHLORIDE 1 G: 1 INJECTION, SOLUTION INTRAVENOUS at 22:33

## 2024-08-23 RX ADMIN — VANCOMYCIN 2 G: 2 INJECTION, SOLUTION INTRAVENOUS at 01:13

## 2024-08-23 RX ADMIN — ALPRAZOLAM 1 MG: 0.5 TABLET ORAL at 22:34

## 2024-08-23 RX ADMIN — IBUPROFEN 800 MG: 400 TABLET ORAL at 14:19

## 2024-08-23 RX ADMIN — CEFEPIME 1 G: 1 INJECTION, POWDER, FOR SOLUTION INTRAMUSCULAR; INTRAVENOUS at 04:35

## 2024-08-23 RX ADMIN — IBUPROFEN 800 MG: 400 TABLET ORAL at 22:35

## 2024-08-23 RX ADMIN — ALPRAZOLAM 1 MG: 0.5 TABLET ORAL at 11:09

## 2024-08-23 SDOH — ECONOMIC STABILITY: FOOD INSECURITY: HOW HARD IS IT FOR YOU TO PAY FOR THE VERY BASICS LIKE FOOD, HOUSING, MEDICAL CARE, AND HEATING?: NOT VERY HARD

## 2024-08-23 SDOH — HEALTH STABILITY: PHYSICAL HEALTH
HOW OFTEN DO YOU NEED TO HAVE SOMEONE HELP YOU WHEN YOU READ INSTRUCTIONS, PAMPHLETS, OR OTHER WRITTEN MATERIAL FROM YOUR DOCTOR OR PHARMACY?: NEVER

## 2024-08-23 SDOH — ECONOMIC STABILITY: FOOD INSECURITY: WITHIN THE PAST 12 MONTHS, THE FOOD YOU BOUGHT JUST DIDN'T LAST AND YOU DIDN'T HAVE MONEY TO GET MORE.: OFTEN TRUE

## 2024-08-23 SDOH — ECONOMIC STABILITY: TRANSPORTATION INSECURITY: IN THE PAST 12 MONTHS, HAS LACK OF TRANSPORTATION KEPT YOU FROM MEDICAL APPOINTMENTS OR FROM GETTING MEDICATIONS?: YES

## 2024-08-23 SDOH — SOCIAL STABILITY: SOCIAL INSECURITY
WITHIN THE LAST YEAR, HAVE YOU BEEN KICKED, HIT, SLAPPED, OR OTHERWISE PHYSICALLY HURT BY YOUR PARTNER OR EX-PARTNER?: NO

## 2024-08-23 SDOH — SOCIAL STABILITY: SOCIAL INSECURITY: WITHIN THE LAST YEAR, HAVE YOU BEEN HUMILIATED OR EMOTIONALLY ABUSED IN OTHER WAYS BY YOUR PARTNER OR EX-PARTNER?: NO

## 2024-08-23 SDOH — ECONOMIC STABILITY: FOOD INSECURITY: WITHIN THE PAST 12 MONTHS, YOU WORRIED THAT YOUR FOOD WOULD RUN OUT BEFORE YOU GOT THE MONEY TO BUY MORE.: OFTEN TRUE

## 2024-08-23 SDOH — SOCIAL STABILITY: SOCIAL INSECURITY
WITHIN THE LAST YEAR, HAVE TO BEEN RAPED OR FORCED TO HAVE ANY KIND OF SEXUAL ACTIVITY BY YOUR PARTNER OR EX-PARTNER?: NO

## 2024-08-23 SDOH — ECONOMIC STABILITY: INCOME INSECURITY: IN THE PAST 12 MONTHS, HAS THE ELECTRIC, GAS, OIL, OR WATER COMPANY THREATENED TO SHUT OFF SERVICE IN YOUR HOME?: NO

## 2024-08-23 SDOH — ECONOMIC STABILITY: INCOME INSECURITY: IN THE LAST 12 MONTHS, WAS THERE A TIME WHEN YOU WERE NOT ABLE TO PAY THE MORTGAGE OR RENT ON TIME?: NO

## 2024-08-23 SDOH — SOCIAL STABILITY: SOCIAL INSECURITY
WITHIN THE LAST YEAR, HAVE YOU BEEN RAPED OR FORCED TO HAVE ANY KIND OF SEXUAL ACTIVITY BY YOUR PARTNER OR EX-PARTNER?: NO

## 2024-08-23 SDOH — ECONOMIC STABILITY: HOUSING INSECURITY: AT ANY TIME IN THE PAST 12 MONTHS, WERE YOU HOMELESS OR LIVING IN A SHELTER (INCLUDING NOW)?: NO

## 2024-08-23 SDOH — ECONOMIC STABILITY: INCOME INSECURITY: HOW HARD IS IT FOR YOU TO PAY FOR THE VERY BASICS LIKE FOOD, HOUSING, MEDICAL CARE, AND HEATING?: NOT VERY HARD

## 2024-08-23 SDOH — SOCIAL STABILITY: SOCIAL INSECURITY: WITHIN THE LAST YEAR, HAVE YOU BEEN AFRAID OF YOUR PARTNER OR EX-PARTNER?: NO

## 2024-08-23 SDOH — ECONOMIC STABILITY: HOUSING INSECURITY: IN THE PAST 12 MONTHS, HOW MANY TIMES HAVE YOU MOVED WHERE YOU WERE LIVING?: 0

## 2024-08-23 SDOH — ECONOMIC STABILITY: HOUSING INSECURITY: IN THE LAST 12 MONTHS, WAS THERE A TIME WHEN YOU WERE NOT ABLE TO PAY THE MORTGAGE OR RENT ON TIME?: NO

## 2024-08-23 SDOH — ECONOMIC STABILITY: TRANSPORTATION INSECURITY
IN THE PAST 12 MONTHS, HAS THE LACK OF TRANSPORTATION KEPT YOU FROM MEDICAL APPOINTMENTS OR FROM GETTING MEDICATIONS?: YES

## 2024-08-23 SDOH — ECONOMIC STABILITY: INCOME INSECURITY: IN THE PAST 12 MONTHS HAS THE ELECTRIC, GAS, OIL, OR WATER COMPANY THREATENED TO SHUT OFF SERVICES IN YOUR HOME?: NO

## 2024-08-23 SDOH — ECONOMIC STABILITY: FOOD INSECURITY: WITHIN THE PAST 12 MONTHS, YOU WORRIED THAT YOUR FOOD WOULD RUN OUT BEFORE YOU GOT MONEY TO BUY MORE.: OFTEN TRUE

## 2024-08-23 SDOH — ECONOMIC STABILITY: TRANSPORTATION INSECURITY
IN THE PAST 12 MONTHS, HAS LACK OF TRANSPORTATION KEPT YOU FROM MEETINGS, WORK, OR FROM GETTING THINGS NEEDED FOR DAILY LIVING?: NO

## 2024-08-23 ASSESSMENT — COGNITIVE AND FUNCTIONAL STATUS - GENERAL
WALKING IN HOSPITAL ROOM: A LITTLE
MOBILITY SCORE: 20
DAILY ACTIVITIY SCORE: 23
DRESSING REGULAR LOWER BODY CLOTHING: A LITTLE
WALKING IN HOSPITAL ROOM: A LITTLE
CLIMB 3 TO 5 STEPS WITH RAILING: A LITTLE
MOBILITY SCORE: 22
TURNING FROM BACK TO SIDE WHILE IN FLAT BAD: A LITTLE
MOVING TO AND FROM BED TO CHAIR: A LITTLE
MOBILITY SCORE: 23
CLIMB 3 TO 5 STEPS WITH RAILING: A LITTLE
DRESSING REGULAR LOWER BODY CLOTHING: A LITTLE
DAILY ACTIVITIY SCORE: 23
CLIMB 3 TO 5 STEPS WITH RAILING: A LITTLE
DRESSING REGULAR LOWER BODY CLOTHING: A LITTLE
DAILY ACTIVITIY SCORE: 23

## 2024-08-23 ASSESSMENT — PAIN - FUNCTIONAL ASSESSMENT
PAIN_FUNCTIONAL_ASSESSMENT: 0-10
PAIN_FUNCTIONAL_ASSESSMENT: 0-10

## 2024-08-23 ASSESSMENT — ENCOUNTER SYMPTOMS
RESPIRATORY NEGATIVE: 1
MUSCULOSKELETAL NEGATIVE: 1
EYES NEGATIVE: 1
CONSTITUTIONAL NEGATIVE: 1
NEUROLOGICAL NEGATIVE: 1
WOUND: 1
PSYCHIATRIC NEGATIVE: 1
GASTROINTESTINAL NEGATIVE: 1
ENDOCRINE NEGATIVE: 1

## 2024-08-23 ASSESSMENT — PAIN SCALES - GENERAL
PAINLEVEL_OUTOF10: 8
PAINLEVEL_OUTOF10: 7
PAINLEVEL_OUTOF10: 6
PAINLEVEL_OUTOF10: 5 - MODERATE PAIN
PAINLEVEL_OUTOF10: 4
PAINLEVEL_OUTOF10: 7

## 2024-08-23 ASSESSMENT — PAIN DESCRIPTION - LOCATION
LOCATION: LEG
LOCATION: LEG

## 2024-08-23 ASSESSMENT — PAIN DESCRIPTION - ORIENTATION: ORIENTATION: RIGHT

## 2024-08-23 ASSESSMENT — ACTIVITIES OF DAILY LIVING (ADL): LACK_OF_TRANSPORTATION: NO

## 2024-08-23 NOTE — CONSULTS
"Nutrition Initial Assessment:   Nutrition Assessment         Patient is a 58 y.o. female presenting with wound infection of her right lower leg. Patient stated recurrent infections in the right lower extremity. No N/V/D/C noted. No abdominal pain. Pt stated losing a little weight unintentionally. (13lbs in 5 weeks, 4.02%) not a significant weight loss. Stated her appetite has been fine but some days does not snack as much depending on how her leg feels.       Nutrition History:  Energy Intake: Good > 75 %  Food Allergies/Intolerances:  None  GI Symptoms: None  Oral Problems: None       Anthropometrics:  Height: 175.3 cm (5' 9\")   Weight: 141 kg (310 lb)   BMI (Calculated): 45.76      Weight History:   323 lbs in July, 325 lbs in may of this year. 4.02% loss over 1 month, not significant.     Weight Change %:  Weight History / % Weight Change: 4.02% (over 1 month, not significant)  Significant Weight Loss: No    Nutrition Focused Physical Exam Findings:  Subcutaneous Fat Loss:   Orbital Fat Pads: Well nourished (slightly bulging fat pads)  Buccal Fat Pads: Well nourished (full, rounded cheeks)  Muscle Wasting:  Temporalis: Well nourished (well-defined muscle)  Pectoralis (Clavicular Region): Well nourished (clavicle not visible)  Deltoid/Trapezius: Well nourished (rounded appearance at arm, shoulder, neck)  Edema:  Edema: none  Physical Findings:  Hair: Negative  Eyes: Negative  Mouth: Negative  Skin: Negative (Skin noting pretibal wound R dorsal foot)    Nutrition Significant Labs:  CBC Trend:   Results from last 7 days   Lab Units 08/23/24  0555 08/22/24  1046   WBC AUTO x10*3/uL 12.5* 11.9*   RBC AUTO x10*6/uL 4.96 5.00   HEMOGLOBIN g/dL 14.5 14.4   HEMATOCRIT % 43.5 44.1   MCV fL 88 88   PLATELETS AUTO x10*3/uL 309 313    , BMP Trend:   Results from last 7 days   Lab Units 08/23/24  0555 08/22/24  1046   GLUCOSE mg/dL 129* 156*   CALCIUM mg/dL 9.3 9.2   SODIUM mmol/L 141 138   POTASSIUM mmol/L 4.0 3.8   CO2 " mmol/L 18* 25   CHLORIDE mmol/L 107 103   BUN mg/dL 17 15   CREATININE mg/dL 0.70 0.70    , A1C:  Lab Results   Component Value Date    HGBA1C 6.7 (H) 08/22/2024       Nutrition Specific Medications:  cefepime, 1 g, intravenous, q8h  enoxaparin, 40 mg, subcutaneous, q12h JESUS  fluticasone, 2 spray, Each Nostril, Daily  insulin lispro, 0-5 Units, subcutaneous, TID  ondansetron, 4 mg, intravenous, Once  pantoprazole, 40 mg, oral, Daily before breakfast  polyethylene glycol, 17 g, oral, Daily  vancomycin, 2 g, intravenous, q12h         I/O:    ;      Dietary Orders (From admission, onward)       Start     Ordered    08/22/24 1429  Adult diet Consistent Carb; CCD 60 gm/meal  Diet effective now        Question Answer Comment   Diet type Consistent Carb    Carb diet selection: CCD 60 gm/meal        08/22/24 1428                     Estimated Needs:   Total Energy Estimated Needs (kCal):  (1551-1974 kcals)  Method for Estimating Needs: 11-14g/kg BW  Total Protein Estimated Needs (g):  (77-96g)  Method for Estimating Needs:  (.8-1.0g/kg Adj BW)  Total Fluid Estimated Needs (mL):  (1551-1974)  Method for Estimating Needs: 1 ml/kcal        Nutrition Diagnosis   Malnutrition Diagnosis  Patient has Malnutrition Diagnosis: No    Nutrition Diagnosis  Patient has Nutrition Diagnosis: Yes  Diagnosis Status (1): New  Nutrition Diagnosis 1: Increased nutrient needs  Related to (1): increased demand for nutrients  As Evidenced by (1): wounds (R dorsal pretibal wound)       Nutrition Interventions/Recommendations         Nutrition Prescription:  Individualized Nutrition Prescription Provided for :  (individualized nutrition presctiption provided for 1687-2276 kcals and 77-96g of protein to be provided.)        Nutrition Interventions:   Interventions: Meals and snacks  Meals and Snacks: Carbohydrate-modified diet  Goal: Continue with Cons CHO diet as ordered        Nutrition Monitoring and Evaluation   Food/Nutrient Related History  Monitoring  Monitoring and Evaluation Plan: Energy intake  Energy Intake: Estimated energy intake  Criteria: Continue with current diet order, and good PO intakes >75% of meals                        Time Spent (min): 60 minutes

## 2024-08-23 NOTE — PROGRESS NOTES
Pharmacy to Dose Vancomycin - Initial Consult    Mrs. Saba Hurtado is a 58 y.o. female admitted for Cellulitis of right lower extremity.    Pharmacy has been consulted for vancomycin dosing and monitoring for complicated skin and soft tissue infection.     Assessment  Vital signs reviewed, including temperature, HR, BP, I/Os.   Available lab results reviewed, including:WBC, serum creatinine, and BUN.  Baseline risks associated with therapy include: pre-existing renal impairment and advanced age.           Plan     Orders placed:scheduled dosing (no loading dose).  Await culture results for growth and sensitivity.  Daily serum creatinine while receiving vancomycin.   Follow for continued vancomycin need, clinical response, and s/s of toxicity.   Expected vancomycin duration: 7 days of total therapy, to be completed on 8/29.  Will consider vancomycin trough level before 5th dose only if therapy expected to continue or if significant renal function change.     Yovany Rodgers, PharmD

## 2024-08-23 NOTE — PROGRESS NOTES
08/23/24 1018   Discharge Planning   Living Arrangements Spouse/significant other   Support Systems Spouse/significant other   Assistance Needed Patient is independent of ADLs and IADLs.   Type of Residence Private residence   Number of Stairs to Enter Residence 0   Number of Stairs Within Residence 0   Do you have animals or pets at home? Yes   Type of Animals or Pets cat   Who is requesting discharge planning? Provider   Home or Post Acute Services None   Expected Discharge Disposition Home   Does the patient need discharge transport arranged? Yes     Met with patient and spouse at bedside. They report no community support, and difficulty with access to food and transportation, but report they have been managing. They are coordinating with insurance for transportation. They were also encouraged to follow up with Job and Family services for additional assistance, and with [facility] for section 8 house and/or landlord regarding income based rent.     Patient plans to return home with spouse upon discharge.

## 2024-08-23 NOTE — PROGRESS NOTES
"Saba Hurtado \"Mrs. Saba Garcia" is a 58 y.o. female on day 1 of admission presenting with Cellulitis of right lower extremity.      Subjective   No events over night.     Reports pain in RLE for last few days,   Also reports leakage from the wound  No c/o fever or chills,        Objective     Last Recorded Vitals  /80 (BP Location: Left arm, Patient Position: Lying)   Pulse 68   Temp 36.7 °C (98.1 °F) (Oral)   Resp 16   Wt 141 kg (310 lb)   SpO2 96%   Intake/Output last 3 Shifts:    Intake/Output Summary (Last 24 hours) at 8/23/2024 1101  Last data filed at 8/23/2024 0113  Gross per 24 hour   Intake 2850 ml   Output --   Net 2850 ml       Admission Weight  Weight: 141 kg (310 lb) (08/22/24 0959)    Daily Weight  08/22/24 : 141 kg (310 lb)    Image Results  XR chest 1 view  Narrative: Interpreted By:  Hugo James,   STUDY:  XR CHEST 1 VIEW;  8/22/2024 10:45 am      INDICATION:  Signs/Symptoms:elevated BP.      COMPARISON:  None.      ACCESSION NUMBER(S):  [id number]      ORDERING CLINICIAN:  BAN ALANIS      FINDINGS:  CARDIOMEDIASTINAL SILHOUETTE AND VASCULATURE:      Cardiac size:  Within normal limits.  Aortic shadow:  Within normal limits.      Mediastinal contours: Within normal limits.      Pulmonary vasculature:  The central vasculature is unremarkable      LUNGS:  Lungs are clear.      ABDOMEN AND OTHER FINDINGS:  No remarkable upper abdominal findings.      BONES:  No acute osseous changes.      Impression: 1.  No active cardiopulmonary disease.      Signed by: Hugo James 8/22/2024 11:21 AM  Dictation workstation:   [id number]  XR tibia fibula right 2 views  Narrative: Interpreted By:  Hugo James,   STUDY:  XR TIBIA FIBULA RIGHT 2 VIEWS;  8/22/2024 10:45 am      INDICATION:  Signs/Symptoms:right leg ulcer.      COMPARISON:  None.      ACCESSION NUMBER(S):  [id number]      ORDERING CLINICIAN:  BAN ALANIS      FINDINGS:  Bony structures:  Staples are present at the " tibial apophysis, with  medullary tunnel to the subchondral surface presumably from previous  anterior cruciate repair. Bony structures otherwise appear intact  without acute fractures.      Joint spaces:  Maintained      Soft tissues:  There is lacy subcutaneous calcification of the distal  calf particularly posteriorly, which may be due to venous  insufficiency, or possibly less likely etiologies such as  dermatomyositis.      Other:  None significant      Impression: No acute findings.      MACRO:  None      Signed by: Hugo James 8/22/2024 11:21 AM  Dictation workstation:   [id number]      Physical Exam  Constitutional:       Appearance: She is obese.   HENT:      Head: Normocephalic.      Nose: Nose normal.   Eyes:      Extraocular Movements: Extraocular movements intact.      Pupils: Pupils are equal, round, and reactive to light.   Cardiovascular:      Rate and Rhythm: Normal rate and regular rhythm.      Heart sounds: Normal heart sounds. No murmur heard.  Pulmonary:      Effort: No respiratory distress.      Breath sounds: Normal breath sounds. No wheezing.   Abdominal:      General: There is no distension.      Palpations: Abdomen is soft.      Tenderness: There is no abdominal tenderness.   Musculoskeletal:         General: Normal range of motion.      Cervical back: Normal range of motion.      Right lower leg: Edema present.      Left lower leg: Edema present.   Skin:     Comments: RLE, ulcer noted around the right leg in the lower aspect above the ankle,    Redness noted, spotty yellow exudate noted,   No local swelling,      Neurological:      General: No focal deficit present.      Mental Status: She is alert and oriented to person, place, and time.   Psychiatric:         Mood and Affect: Mood normal.         Relevant Results  Scheduled medications  cefepime, 1 g, intravenous, q8h  enoxaparin, 40 mg, subcutaneous, q12h JESUS  fluticasone, 2 spray, Each Nostril, Daily  insulin lispro, 0-5 Units,  subcutaneous, TID  ondansetron, 4 mg, intravenous, Once  pantoprazole, 40 mg, oral, Daily before breakfast  polyethylene glycol, 17 g, oral, Daily  vancomycin, 2 g, intravenous, q12h      Continuous medications     PRN medications  PRN medications: albuterol, ALPRAZolam, furosemide, ibuprofen, labetaloL, sodium chloride, vancomycin    Results for orders placed or performed during the hospital encounter of 08/22/24 (from the past 24 hour(s))   Urinalysis with Reflex Culture and Microscopic   Result Value Ref Range    Color, Urine Light-Yellow Light-Yellow, Yellow, Dark-Yellow    Appearance, Urine Clear Clear    Specific Gravity, Urine 1.014 1.005 - 1.035    pH, Urine 6.5 5.0, 5.5, 6.0, 6.5, 7.0, 7.5, 8.0    Protein, Urine 10 (TRACE) NEGATIVE, 10 (TRACE), 20 (TRACE) mg/dL    Glucose, Urine Normal Normal mg/dL    Blood, Urine NEGATIVE NEGATIVE    Ketones, Urine NEGATIVE NEGATIVE mg/dL    Bilirubin, Urine NEGATIVE NEGATIVE    Urobilinogen, Urine Normal Normal mg/dL    Nitrite, Urine NEGATIVE NEGATIVE    Leukocyte Esterase, Urine NEGATIVE NEGATIVE   Urinalysis Microscopic   Result Value Ref Range    WBC, Urine NONE 1-5, NONE /HPF    RBC, Urine 1-2 NONE, 1-2, 3-5 /HPF    Squamous Epithelial Cells, Urine 1-9 (SPARSE) Reference range not established. /HPF    Mucus, Urine FEW Reference range not established. /LPF   POCT GLUCOSE   Result Value Ref Range    POCT Glucose 114 (H) 74 - 99 mg/dL   POCT GLUCOSE   Result Value Ref Range    POCT Glucose 143 (H) 74 - 99 mg/dL   CBC   Result Value Ref Range    WBC 12.5 (H) 4.4 - 11.3 x10*3/uL    nRBC 0.0 0.0 - 0.0 /100 WBCs    RBC 4.96 4.00 - 5.20 x10*6/uL    Hemoglobin 14.5 12.0 - 16.0 g/dL    Hematocrit 43.5 36.0 - 46.0 %    MCV 88 80 - 100 fL    MCH 29.2 26.0 - 34.0 pg    MCHC 33.3 32.0 - 36.0 g/dL    RDW 12.5 11.5 - 14.5 %    Platelets 309 150 - 450 x10*3/uL   Renal Function Panel   Result Value Ref Range    Glucose 129 (H) 65 - 99 mg/dL    Sodium 141 133 - 145 mmol/L    Potassium  4.0 3.4 - 5.1 mmol/L    Chloride 107 97 - 107 mmol/L    Bicarbonate 18 (L) 24 - 31 mmol/L    Urea Nitrogen 17 8 - 25 mg/dL    Creatinine 0.70 0.40 - 1.60 mg/dL    eGFR >90 >60 mL/min/1.73m*2    Calcium 9.3 8.5 - 10.4 mg/dL    Phosphorus 3.2 2.5 - 4.5 mg/dL    Albumin 4.0 3.5 - 5.0 g/dL    Anion Gap 16 <=19 mmol/L   Magnesium   Result Value Ref Range    Magnesium 2.20 1.60 - 3.10 mg/dL       Assessment/Plan      Assessment & Plan  Cellulitis of right lower extremity  Mrs. Saba Hurtado is a 58 y.o. female with a PMH of morbid obesity, prediabetes, PETTY not on CPAP, chronic venous insufficiency, hypertension, asthma, who is  admitted to the internal medicine service for choric venous ulcer, right leg cellulitis and  hypertension.     Right leg cellulitis with chronic right leg wound  -Patient with chronic venous insufficiency and chronic right leg wound which appears to be more painful than in the past.  No evidence of necrotic tissue.    X-ray of the right tib-fib with no acute findings.  Continue on cefepime, vancomycin, which was started in the ER.  -Follow the blood cultures.  -Consult ID, wound care nurse.  -Consult vascular surgery given patient's venous insufficiency and chronic wound and with acute cellulitis.  -Patient's  is at the bedside and all of their questions were answered.     Elevated Blood pressure/ hypertension  -Patient reports SBP is typically 170s to 190s in outpatient clinics but typically SBP is 140s to 150s at home and is not taking BP medications as she states that BP medications have dropped her blood pressure at home in the past.  -Patient was given hydralazine 10 mg IV in the ER and BP is  has improved.    -Monitor.      Prediabetes  -Diabetes is listed in the chart and patient reports having history of prediabetes.  HbA1c 6.7  Continue to monitor on SSI,      Morbid obesity  -Diet modification and exercise recommended.     Asthma  -Appears to be stable at this time.  Can give  albuterol as needed and monitor.     PETTY  -PETTY as listed in the chart and patient denies using CPAP at home.  Recommend follow-up with PCP and sleep medicine as an outpatient.     DVT prophylaxis  -Lovenox subcu.      Dispo: Improvement in infection. Pending cultures.           Davis Lyon MD

## 2024-08-23 NOTE — PROGRESS NOTES
08/23/24 1014   Financial Resource Strain   How hard is it for you to pay for the very basics like food, housing, medical care, and heating? Not very   Housing Stability   In the last 12 months, was there a time when you were not able to pay the mortgage or rent on time? N   In the past 12 months, how many times have you moved where you were living? 0   At any time in the past 12 months, were you homeless or living in a shelter (including now)? N   Transportation Needs   In the past 12 months, has lack of transportation kept you from medical appointments or from getting medications? yes  (Patient and spouse are aware of [employer] and transportation through insurance. They report use of both, but that [employer] is not always timely.)   In the past 12 months, has lack of transportation kept you from meetings, work, or from getting things needed for daily living? No   Food Insecurity   Within the past 12 months, you worried that your food would run out before you got the money to buy more. Often true  (Spoke with patient and spouse regarding the same. Due to transportation barriers they are unable to utilize food casillas.They do have food stamps, but report the amount is small.They are aware to follow up with [employer] to apply for additional assistance.)   Within the past 12 months, the food you bought just didn't last and you didn't have money to get more. Often true   Intimate Partner Violence   Within the last year, have you been afraid of your partner or ex-partner? No   Within the last year, have you been humiliated or emotionally abused in other ways by your partner or ex-partner? No   Within the last year, have you been kicked, hit, slapped, or otherwise physically hurt by your partner or ex-partner? No   Within the last year, have you been raped or forced to have any kind of sexual activity by your partner or ex-partner? No   Utilities   In the past 12 months has the electric, gas, oil, or water company threatened to  shut off services in your home? No   Health Literacy   How often do you need to have someone help you when you read instructions, pamphlets, or other written material from your doctor or pharmacy? Never     Patient and spouse report recent increase in rent. Encouraged them to apply for [employer] 8 housing assistance and utility assistance through [employer].

## 2024-08-23 NOTE — CARE PLAN
The patient's goals for the shift include      The clinical goals for the shift include pain control, wound care      Problem: Diabetes  Goal: Achieve decreasing blood glucose levels by end of shift  Outcome: Progressing  Goal: Increase stability of blood glucose readings by end of shift  Outcome: Progressing  Goal: Maintain electrolyte levels within acceptable range throughout shift  Outcome: Progressing  Goal: Learn about and adhere to nutrition recommendations by end of shift  Outcome: Progressing  Goal: Vital signs within normal range for age by end of shift  Outcome: Progressing  Goal: Increase self care and/or family involovement by end of shift  Outcome: Progressing  Goal: Receive DSME education by end of shift  Outcome: Progressing     Problem: Pain - Adult  Goal: Verbalizes/displays adequate comfort level or baseline comfort level  Outcome: Progressing     Problem: Safety - Adult  Goal: Free from fall injury  Outcome: Progressing     Problem: Discharge Planning  Goal: Discharge to home or other facility with appropriate resources  Outcome: Progressing     Problem: Chronic Conditions and Co-morbidities  Goal: Patient's chronic conditions and co-morbidity symptoms are monitored and maintained or improved  Outcome: Progressing     Problem: Fall/Injury  Goal: Verbalize understanding of personal risk factors for fall in the hospital  Outcome: Progressing  Goal: Verbalize understanding of risk factor reduction measures to prevent injury from fall in the home  Outcome: Progressing  Goal: Use assistive devices by end of the shift  Outcome: Progressing  Goal: Pace activities to prevent fatigue by end of the shift  Outcome: Progressing     Problem: Diabetes  Goal: Maintain glucose levels >70mg/dl to <250mg/dl throughout shift  Outcome: Met  Goal: No changes in neurological exam by end of shift  Outcome: Met     Problem: Pain  Goal: Takes deep breaths with improved pain control throughout the shift  Outcome:  Met  Goal: Turns in bed with improved pain control throughout the shift  Outcome: Met  Goal: Walks with improved pain control throughout the shift  Outcome: Met  Goal: Performs ADL's with improved pain control throughout shift  Outcome: Met  Goal: Free from acute confusion related to pain meds throughout the shift  Outcome: Met     Problem: Fall/Injury  Goal: Not fall by end of shift  Outcome: Met  Goal: Be free from injury by end of the shift  Outcome: Met

## 2024-08-23 NOTE — CONSULTS
Reason for Consult   Right leg venous stasis ulceration with cellulitis    History Of Present Illness    This is a 58-year-old female with past medical history of type 2 diabetes, hypertension, morbid obesity, asthma and venous insufficiency who our service was consulted on for further management of chronic right leg ulcer.  Patient previously seen by Dr. Barney on 2/22/2024 for bilateral venous stasis ulcerations and lymphedema.  Patient seen in the wound care center in the past for debridement without resolution last seen by Dr Cheung in March 2024.  She wears compression stockings.  Dr. Barney advised referral to Dr. Bacon for further management and ordered bilateral venous insufficiency studies.  Patient did not undergo studies and was not seen by Dr. Bacon.     Per last primary care physician note patient's wounds were improving with frequent dressing changes however she was recently only able to change twice daily due to supplies.  Patient has moderate amount of drainage from wounds which causes dressings to saturate in a couple of hours.  She was taking clindamycin as an outpatient.    On bedside exam family member present during exam.  She states she is allergic to Chux pads therefore, I used a sheet.  Also tells me debridements are contraindicated per previous physicians therefore I did not debride the ulcer site.  She has allergies to the Santyl and Xeroform.  Sounds like she can tolerate Vaseline gauze and or Adaptic.  Tells me she cannot have compression when lying down due to her knee.  She also states that she notices increased drainage with compression dressing.  Endorses severe pain associated with her large ulcer site.  Tells me she was unable to undergo the venous insufficiency study as an outpatient due to the size of the ulcer.  She did not follow-up with Dr. Bacon.    Past Medical History  Past Medical History:   Diagnosis Date    Angina at rest ([facility])     Obstructive sleep apnea (adult)  (pediatric)     Obstructive sleep apnea, adult    Other specified diabetes mellitus without complications (Multi)     Diabetes mellitus of other type without complication    Other specified diabetes mellitus without complications (Multi)     Diabetes mellitus of other type without complication    Personal history of other diseases of the circulatory system     History of hypertension    Personal history of other diseases of the circulatory system     History of angina    Personal history of other diseases of the circulatory system     History of hypertension    Personal history of other diseases of the nervous system and sense organs     History of obstructive sleep apnea    Personal history of other diseases of the respiratory system     History of asthma    Personal history of other specified conditions     History of chest pain    Unspecified asthma with (acute) exacerbation ([facility])     Asthma with acute exacerbation, unspecified asthma severity, unspecified whether persistent         Surgical History  Past Surgical History:   Procedure Laterality Date    OTHER SURGICAL HISTORY  12/28/2021    Knee surgery    OTHER SURGICAL HISTORY  12/28/2021    Appendectomy    OTHER SURGICAL HISTORY  12/28/2021    Hysterectomy    OTHER SURGICAL HISTORY  12/28/2021    Tonsillectomy    OTHER SURGICAL HISTORY  12/28/2021    Hysterectomy    OTHER SURGICAL HISTORY  12/28/2021    Knee surgery    OTHER SURGICAL HISTORY  12/28/2021    Breast surgery    OTHER SURGICAL HISTORY  12/28/2021    Appendectomy    OTHER SURGICAL HISTORY  12/28/2021    Tonsillectomy          Social History  Social History     Socioeconomic History    Marital status:      Spouse name: Not on file    Number of children: Not on file    Years of education: Not on file    Highest education level: Not on file   Occupational History    Not on file   Tobacco Use    Smoking status: Never    Smokeless tobacco: Never   Substance and Sexual Activity    Alcohol use:  Never    Drug use: Never    Sexual activity: Not on file   Other Topics Concern    Not on file   Social History Narrative    Not on file     Social Determinants of Health     Financial Resource Strain: Low Risk  (8/22/2024)    Overall Financial Resource Strain (CARDIA)     Difficulty of Paying Living Expenses: Not hard at all   Food Insecurity: Not on file   Transportation Needs: No Transportation Needs (8/22/2024)    PRAPARE - Transportation     Lack of Transportation (Medical): No     Lack of Transportation (Non-Medical): No   Physical Activity: Not on file   Stress: Not on file   Social Connections: Not on file   Intimate Partner Violence: Not on file   Housing Stability: Low Risk  (8/22/2024)    Housing Stability Vital Sign     Unable to Pay for Housing in the Last Year: No     Number of Times Moved in the Last Year: 0     Homeless in the Last Year: No         Family History  Family History   Problem Relation Name Age of Onset    Migraines Daughter            Allergies  Allergies   Allergen Reactions    Clarithromycin Anaphylaxis and Unknown    Diazepam Anaphylaxis    Erythromycin Anaphylaxis    Lorazepam Anaphylaxis and Unknown    Meperidine Anaphylaxis    Morphine Anaphylaxis    Norvasc [Amlodipine] Other and Cardiac arrhythmia/arrest    Oxycodone Anaphylaxis and Unknown    Pertussis Vaccines Anaphylaxis    Prednisone Anaphylaxis and Unknown    Tramadol Anaphylaxis    Amoxicillin Unknown    Aspirin Unknown    Dicyclomine Unknown    Diphenoxylate-Atropine Unknown    Doxycycline Unknown    Hydrocodone Unknown    Hydrocodone-Acetaminophen Unknown    Propoxyphene-Acetaminophen Unknown    Pse-Hydrocodone-Pot Guaiaco Unknown         Relevant Results  Results for orders placed or performed during the hospital encounter of 08/22/24 (from the past 24 hour(s))   CBC and Auto Differential   Result Value Ref Range    WBC 11.9 (H) 4.4 - 11.3 x10*3/uL    nRBC 0.0 0.0 - 0.0 /100 WBCs    RBC 5.00 4.00 - 5.20 x10*6/uL     Hemoglobin 14.4 12.0 - 16.0 g/dL    Hematocrit 44.1 36.0 - 46.0 %    MCV 88 80 - 100 fL    MCH 28.8 26.0 - 34.0 pg    MCHC 32.7 32.0 - 36.0 g/dL    RDW 12.3 11.5 - 14.5 %    Platelets 313 150 - 450 x10*3/uL    Neutrophils % 70.8 40.0 - 80.0 %    Immature Granulocytes %, Automated 0.8 0.0 - 0.9 %    Lymphocytes % 19.4 13.0 - 44.0 %    Monocytes % 6.4 2.0 - 10.0 %    Eosinophils % 2.1 0.0 - 6.0 %    Basophils % 0.5 0.0 - 2.0 %    Neutrophils Absolute 8.40 (H) 1.20 - 7.70 x10*3/uL    Immature Granulocytes Absolute, Automated 0.10 0.00 - 0.70 x10*3/uL    Lymphocytes Absolute 2.30 1.20 - 4.80 x10*3/uL    Monocytes Absolute 0.76 0.10 - 1.00 x10*3/uL    Eosinophils Absolute 0.25 0.00 - 0.70 x10*3/uL    Basophils Absolute 0.06 0.00 - 0.10 x10*3/uL   Comprehensive metabolic panel   Result Value Ref Range    Glucose 156 (H) 65 - 99 mg/dL    Sodium 138 133 - 145 mmol/L    Potassium 3.8 3.4 - 5.1 mmol/L    Chloride 103 97 - 107 mmol/L    Bicarbonate 25 24 - 31 mmol/L    Urea Nitrogen 15 8 - 25 mg/dL    Creatinine 0.70 0.40 - 1.60 mg/dL    eGFR >90 >60 mL/min/1.73m*2    Calcium 9.2 8.5 - 10.4 mg/dL    Albumin 4.1 3.5 - 5.0 g/dL    Alkaline Phosphatase 98 35 - 125 U/L    Total Protein 7.3 5.9 - 7.9 g/dL    AST 13 5 - 40 U/L    Bilirubin, Total 0.5 0.1 - 1.2 mg/dL    ALT 14 5 - 40 U/L    Anion Gap 10 <=19 mmol/L   Sedimentation rate, automated   Result Value Ref Range    Sedimentation Rate 26 0 - 30 mm/h   C-reactive protein   Result Value Ref Range    C-Reactive Protein 1.40 0.00 - 2.00 mg/dL   Blood Gas Lactic Acid, Venous   Result Value Ref Range    POCT Lactate, Venous 1.7 0.4 - 2.0 mmol/L   Blood Culture    Specimen: Peripheral Venipuncture; Blood culture   Result Value Ref Range    Blood Culture Loaded on Instrument - Culture in progress    Blood Culture    Specimen: Peripheral Venipuncture; Blood culture   Result Value Ref Range    Blood Culture Loaded on Instrument - Culture in progress    Hemoglobin A1c   Result Value Ref  Range    Hemoglobin A1C 6.7 (H) See below %    Estimated Average Glucose 146 Not Established mg/dL   Tissue/Wound Culture/Smear    Specimen: Wound/Tissue; Tissue/Biopsy   Result Value Ref Range    Gram Stain No polymorphonuclear leukocytes seen (A)     Gram Stain (4+) Abundant Gram negative bacilli (A)     Gram Stain (1+) Rare Gram positive cocci (A)    Urinalysis with Reflex Culture and Microscopic   Result Value Ref Range    Color, Urine Light-Yellow Light-Yellow, Yellow, Dark-Yellow    Appearance, Urine Clear Clear    Specific Gravity, Urine 1.014 1.005 - 1.035    pH, Urine 6.5 5.0, 5.5, 6.0, 6.5, 7.0, 7.5, 8.0    Protein, Urine 10 (TRACE) NEGATIVE, 10 (TRACE), 20 (TRACE) mg/dL    Glucose, Urine Normal Normal mg/dL    Blood, Urine NEGATIVE NEGATIVE    Ketones, Urine NEGATIVE NEGATIVE mg/dL    Bilirubin, Urine NEGATIVE NEGATIVE    Urobilinogen, Urine Normal Normal mg/dL    Nitrite, Urine NEGATIVE NEGATIVE    Leukocyte Esterase, Urine NEGATIVE NEGATIVE   Urinalysis Microscopic   Result Value Ref Range    WBC, Urine NONE 1-5, NONE /HPF    RBC, Urine 1-2 NONE, 1-2, 3-5 /HPF    Squamous Epithelial Cells, Urine 1-9 (SPARSE) Reference range not established. /HPF    Mucus, Urine FEW Reference range not established. /LPF   POCT GLUCOSE   Result Value Ref Range    POCT Glucose 114 (H) 74 - 99 mg/dL   POCT GLUCOSE   Result Value Ref Range    POCT Glucose 143 (H) 74 - 99 mg/dL   CBC   Result Value Ref Range    WBC 12.5 (H) 4.4 - 11.3 x10*3/uL    nRBC 0.0 0.0 - 0.0 /100 WBCs    RBC 4.96 4.00 - 5.20 x10*6/uL    Hemoglobin 14.5 12.0 - 16.0 g/dL    Hematocrit 43.5 36.0 - 46.0 %    MCV 88 80 - 100 fL    MCH 29.2 26.0 - 34.0 pg    MCHC 33.3 32.0 - 36.0 g/dL    RDW 12.5 11.5 - 14.5 %    Platelets 309 150 - 450 x10*3/uL   Renal Function Panel   Result Value Ref Range    Glucose 129 (H) 65 - 99 mg/dL    Sodium 141 133 - 145 mmol/L    Potassium 4.0 3.4 - 5.1 mmol/L    Chloride 107 97 - 107 mmol/L    Bicarbonate 18 (L) 24 - 31 mmol/L     Urea Nitrogen 17 8 - 25 mg/dL    Creatinine 0.70 0.40 - 1.60 mg/dL    eGFR >90 >60 mL/min/1.73m*2    Calcium 9.3 8.5 - 10.4 mg/dL    Phosphorus 3.2 2.5 - 4.5 mg/dL    Albumin 4.0 3.5 - 5.0 g/dL    Anion Gap 16 <=19 mmol/L   Magnesium   Result Value Ref Range    Magnesium 2.20 1.60 - 3.10 mg/dL     XR chest 1 view    Result Date: 8/22/2024  Interpreted By:  Hugo James, STUDY: XR CHEST 1 VIEW;  8/22/2024 10:45 am   INDICATION: Signs/Symptoms:elevated BP.   COMPARISON: None.   ACCESSION NUMBER(S): [id number]   ORDERING CLINICIAN: BAN ALANIS   FINDINGS: CARDIOMEDIASTINAL SILHOUETTE AND VASCULATURE:   Cardiac size:  Within normal limits. Aortic shadow:  Within normal limits.   Mediastinal contours: Within normal limits.   Pulmonary vasculature:  The central vasculature is unremarkable   LUNGS: Lungs are clear.   ABDOMEN AND OTHER FINDINGS: No remarkable upper abdominal findings.   BONES: No acute osseous changes.       1.  No active cardiopulmonary disease.   Signed by: Hugo James 8/22/2024 11:21 AM Dictation workstation:   [id number]    XR tibia fibula right 2 views    Result Date: 8/22/2024  Interpreted By:  Hugo James, STUDY: XR TIBIA FIBULA RIGHT 2 VIEWS;  8/22/2024 10:45 am   INDICATION: Signs/Symptoms:right leg ulcer.   COMPARISON: None.   ACCESSION NUMBER(S): [id number]   ORDERING CLINICIAN: BAN ALANIS   FINDINGS: Bony structures:  Staples are present at the tibial apophysis, with medullary tunnel to the subchondral surface presumably from previous anterior cruciate repair. Bony structures otherwise appear intact without acute fractures.   Joint spaces:  Maintained   Soft tissues:  There is lacy subcutaneous calcification of the distal calf particularly posteriorly, which may be due to venous insufficiency, or possibly less likely etiologies such as dermatomyositis.   Other:  None significant       No acute findings.   MACRO: None   Signed by: Hugo James 8/22/2024 11:21 AM Dictation  workstation:   [id number]       Physical exam  Constitutional:  Alert and oriented to person, place, date/time in no acute distress.  HEENT:  Atraumatic, normocephalic. PERRL. EOMI.  Nares patent.  Mucous membranes moist.    Neck:  Trachea midline.  Respiratory:  Clear to auscultation.  Cardiac:  Regular rate and rhythm.  No murmurs.  Cardiovascular:  No edema of the extremities.  Pulse exam: Radial and femoral pulses palpable bilateral.   Abdominal:  Soft, nontender, nondistended, bowel sounds present.  Morbidly obese  Musculoskeletal:  Moves extremities freely.  Dermatological: Left lower extremity without ulcerations.  Right leg with 18 x 14 x 0.2 cm ulceration site with granular tissue slough and exudate.  She also has a posterior ulcer site that measures approximately 6 x 4 cm.  Severely tender with palpation.  No malodor.  No periwound erythema.  Moderate drainage present.  Serous.  Neurological: Alert and oriented to person, place, date/time  Psych:  Calm, cooperative    Assessment and Plan    Venous insufficiency  Chronic venous stasis ulcer  Consider workup for pyoderma gangrenosum  Left leg cellulitis    Patient previously seen in office by Dr. Barney in February and was lost to follow-up.  She was advised to undergo venous insufficiency studies and be seen by Dr. Bacon.  She was unable to undergo venous studies due to size of ulcer.    Patient will need to go venous insufficiency studies as an outpatient once the ulcer site is smaller.  Per patient she does not tolerate Unna boot applications.  Also does not tolerate Xeroform.  Can only wear Ace compression when not lying down.  States debridements weight make the ulcer site worse.  Also tells me Santyl does not work and bothers her ulcer site.  - Recommend Vaseline gauze or Adaptic to be applied once daily and to change the outer dressing every 4-6 hours as needed.  - ID following  - Wound culture pending

## 2024-08-23 NOTE — CONSULTS
"Inpatient consult to Infectious Diseases  Consult performed by: Annmarie Lombardi, APRN-CNP  Consult ordered by: Rudy Chaney DO  Reason for consult: right leg wound          Primary MD: Constantine Gomez PA-C        History Of Present Illness  Saba Hurtado \"Mrs. Saba Hurtado\" is a 58 y.o. female with past medical history of type 2 diabetes mellitus, venous insufficiency.  She presented to the emergency room with worsening right leg wound.  She reports chronic venous stasis ulceration and lymphedema.  She was managed by [facility] however debridements without resolution she discontinued.  She reports she is now managed by her PCP.  She reports increased right leg pain.  She reports increased size to right leg wound with increased drainage.  She reports recent oral antibiotic clindamycin without improvement of symptoms.  Denies fever chills nausea vomiting or diarrhea.  She is on room air with normal oxygenation saturating 97%.  Labs with no leukocytosis.  Unremarkable urinalysis.  Wound and Blood cultures pending.  Right tibia-fibula x-ray with no acute findings.  Chest x-ray with no acute findings.  History of cultures with MSSA, Pseudomonas.  She has extensive antibiotic allergy and intolerance list.  She is on IV cefepime, vancomycin and is tolerating both.       Past Medical History  She has a past medical history of Angina at rest (CMS-HCC), Obstructive sleep apnea (adult) (pediatric), Other specified diabetes mellitus without complications (Multi), Other specified diabetes mellitus without complications (Multi), Personal history of other diseases of the circulatory system, Personal history of other diseases of the circulatory system, Personal history of other diseases of the circulatory system, Personal history of other diseases of the nervous system and sense organs, Personal history of other diseases of the respiratory system, Personal history of other specified conditions, and " Unspecified asthma with (acute) exacerbation ([facility]).    Surgical History  She has a past surgical history that includes Other surgical history (12/28/2021); Other surgical history (12/28/2021); Other surgical history (12/28/2021); Other surgical history (12/28/2021); Other surgical history (12/28/2021); Other surgical history (12/28/2021); Other surgical history (12/28/2021); Other surgical history (12/28/2021); and Other surgical history (12/28/2021).     Social History     Occupational History    Not on file   Tobacco Use    Smoking status: Never    Smokeless tobacco: Never   Substance and Sexual Activity    Alcohol use: Never    Drug use: Never    Sexual activity: Not on file     Travel History   Travel since 07/23/24    No documented travel since 07/23/24              Family History  Family History   Problem Relation Name Age of Onset    Migraines Daughter       Allergies  Clarithromycin, Diazepam, Erythromycin, Lorazepam, Meperidine, Morphine, Norvasc [amlodipine], Oxycodone, Pertussis vaccines, Prednisone, Tramadol, Amoxicillin, Aspirin, Dicyclomine, Diphenoxylate-atropine, Doxycycline, Hydrocodone, Hydrocodone-acetaminophen, Propoxyphene-acetaminophen, and Pse-hydrocodone-pot guaiaco       There is no immunization history on file for this patient.  Medications  Home medications:  Medications Prior to Admission   Medication Sig Dispense Refill Last Dose    albuterol (Ventolin HFA) 90 mcg/actuation inhaler Inhale 2 puffs every 4 hours if needed for wheezing or shortness of breath. 8 g 5 Past Month    ALPRAZolam (Xanax) 0.5 mg tablet Take 1-2 tablets (0.5-1 mg) by mouth 2 times a day. Do not fill before July 27, 2024. 120 tablet 2 8/22/2024 at am    Flonase Allergy Relief 50 mcg/actuation nasal spray Administer 2 sprays into each nostril once daily. 16 g 11 8/22/2024 at am    hydrocortisone 1 % cream 1 APPLICATION EXTERNALLY TWICE A DAY 30 DAYS 454 g 11 Past Week    ibuprofen 800 mg tablet TAKE 1 TABLET BY  "MOUTH THREE TIMES A DAY WITH FOOD OR MILK AS NEEDED 90 tablet 3 8/22/2024 at am    ketoconazole (NIZOral) 2 % cream Apply topically 2 times a day. 90 g 3 Past Week    ketoconazole (NIZOral) 2 % shampoo Apply 1 Application topically 2 times a day. 360 mL 3 Past Week    mupirocin (Bactroban) 2 % ointment APPLY TO AFFECTED AREA TWICE DAILY UNTIL HEALED 22 g 3 Past Week    omeprazole (PriLOSEC) 20 mg DR capsule TAKE 1 CAPSULE BY MOUTH 30 MINUTES BEFORE MORNING MEAL ONCE A DAY FOR 90 DAY(S) 90 capsule 1 Past Month    sodium chloride (Saline NasaL) 0.65 % nasal spray Administer 2 sprays into each nostril once daily as needed for congestion. 30 mL 11 8/22/2024 at am    Vivelle-Dot 0.1 mg/24 hr patch Place 1 patch on the skin 2 times a week. 8 patch 3 8/21/2024    blood pressure test kit-wrist kit OMRON 7 Series wrist cuff - tests BP as needed 1 each 0     blood-glucose meter (OneTouch Ultra2 Meter) misc AS DIRECTED 1 each 1     elastic bandage (Coban) 4 X 5 \"-yard bandage Apply 1 Units topically once daily. 30 each 3     FreeStyle Test strip USE AS DIRECTED.       furosemide (Lasix) 20 mg tablet Take 1 tablet (20 mg) by mouth once daily as needed (edema). 30 tablet 3     gauze bandage (Kerlix) 4 1/2 X 147 \" bandage Apply 1 Units topically once daily. 30 each 3     lancets 30 gauge misc once daily. CHECK ONCE DAILY       non-adher band-sunflowr-petrol (Oil Emulsion Dressing) 3 X 8 \" bandage Apply 1 Units topically once daily. 30 each 3     OneTouch Delica Plus Lancet 30 gauge misc 1 Units by in vitro route 2 times a day.  USE AS DIRECTED 100 each 11     OneTouch Ultra Control solution 1 Units by in vitro route if needed (control). AS DIRECTED IN VITRO DAILY AS NEEDEDAS DIRECTED IN VITRO DAILY AS NEEDED 1 each 1     ProAir HFA 90 mcg/actuation inhaler Inhale 2 puffs every 6 hours if needed for wheezing. USE AS DIRECTED 8.5 g 2      Current medications:  Scheduled medications  cefepime, 1 g, intravenous, q8h  enoxaparin, 40 " mg, subcutaneous, q12h JESUS  fluticasone, 2 spray, Each Nostril, Daily  insulin lispro, 0-5 Units, subcutaneous, TID  ondansetron, 4 mg, intravenous, Once  pantoprazole, 40 mg, oral, Daily before breakfast  polyethylene glycol, 17 g, oral, Daily  vancomycin, 2 g, intravenous, q12h      Continuous medications     PRN medications  PRN medications: albuterol, ALPRAZolam, furosemide, ibuprofen, labetaloL, sodium chloride, vancomycin    Review of Systems   Constitutional: Negative.    HENT: Negative.     Eyes: Negative.    Respiratory: Negative.     Cardiovascular:  Positive for leg swelling.   Gastrointestinal: Negative.    Endocrine: Negative.    Genitourinary: Negative.    Musculoskeletal: Negative.    Skin:  Positive for wound.   Neurological: Negative.    Psychiatric/Behavioral: Negative.          Objective  Range of Vitals (last 24 hours)  Heart Rate:  [68-87]   Temp:  [36.6 °C (97.9 °F)-36.9 °C (98.4 °F)]   Resp:  [16-18]   BP: (164-182)/(80-91)   SpO2:  [95 %-98 %]   Daily Weight  08/22/24 : 141 kg (310 lb)    Body mass index is 45.78 kg/m².     Physical Exam  Constitutional:       Appearance: Normal appearance.   HENT:      Head: Normocephalic and atraumatic.      Nose: Nose normal.      Mouth/Throat:      Mouth: Mucous membranes are moist.      Pharynx: Oropharynx is clear.   Eyes:      General: No scleral icterus.  Cardiovascular:      Rate and Rhythm: Normal rate and regular rhythm.   Pulmonary:      Effort: Pulmonary effort is normal.      Breath sounds: Normal breath sounds.   Abdominal:      General: Bowel sounds are normal.      Palpations: Abdomen is soft.   Musculoskeletal:         General: Normal range of motion.      Cervical back: Normal range of motion and neck supple.   Skin:     General: Skin is warm and dry.      Comments: Right leg ulcer with large amount of granular tissue, some  slough and exudate. tenderness with palpation.  No malodor.  No periwound erythema.  Moderate serous drainage noted.    Neurological:      General: No focal deficit present.      Mental Status: She is alert and oriented to person, place, and time. Mental status is at baseline.   Psychiatric:         Mood and Affect: Mood normal.         Behavior: Behavior normal.          Relevant Results  Outside Hospital Results  No  Labs  Results from last 72 hours   Lab Units 08/23/24  0555 08/22/24  1046   WBC AUTO x10*3/uL 12.5* 11.9*   HEMOGLOBIN g/dL 14.5 14.4   HEMATOCRIT % 43.5 44.1   PLATELETS AUTO x10*3/uL 309 313   NEUTROS PCT AUTO %  --  70.8   LYMPHS PCT AUTO %  --  19.4   MONOS PCT AUTO %  --  6.4   EOS PCT AUTO %  --  2.1     Results from last 72 hours   Lab Units 08/23/24  0555 08/22/24  1046   SODIUM mmol/L 141 138   POTASSIUM mmol/L 4.0 3.8   CHLORIDE mmol/L 107 103   CO2 mmol/L 18* 25   BUN mg/dL 17 15   CREATININE mg/dL 0.70 0.70   GLUCOSE mg/dL 129* 156*   CALCIUM mg/dL 9.3 9.2   ANION GAP mmol/L 16 10   EGFR mL/min/1.73m*2 >90 >90   PHOSPHORUS mg/dL 3.2  --      Results from last 72 hours   Lab Units 08/23/24  0555 08/22/24  1046   ALK PHOS U/L  --  98   BILIRUBIN TOTAL mg/dL  --  0.5   PROTEIN TOTAL g/dL  --  7.3   ALT U/L  --  14   AST U/L  --  13   ALBUMIN g/dL 4.0 4.1     Estimated Creatinine Clearance: 125 mL/min (by C-G formula based on SCr of 0.7 mg/dL).  C-Reactive Protein   Date Value Ref Range Status   08/22/2024 1.40 0.00 - 2.00 mg/dL Final     Sedimentation Rate   Date Value Ref Range Status   08/22/2024 26 0 - 30 mm/h Final     HIV 1 and 2 Screen   Date Value Ref Range Status   04/04/2023   Final    NONREACTIVE  Reference range: NONREACTIVE     HIV Ag/Ab screen is performed using the [employer]   HIV Ag/Ab Combo assay which detects the presence of HIV    p24 antigen as well as antibodies to HIV-1   (Group M and O) and HIV-2.  .  No laboratory evidence of HIV infection. If acute HIV infection is   suspected, consider testing for HIV RNA by PCR (viral load).  Test Performed at:  [facility] [id number]  , [place], [address]  :          HIV 1/2 Antibody Confirmation   Date Value Ref Range Status   10/21/2021 Test Not Indicated  Final     Hepatitis C Ab   Date Value Ref Range Status   04/04/2023   Final    NONREACTIVE  Reference range: NONREACTIVE     Results from patients taking biotin supplements or receiving   high-dose biotin therapy should be interpreted with caution   due to possible interference with this test. Providers may    contact their local laboratory for further information.  Test Performed at:  [facility] [address] , [place], [place], [address]  :          Microbiology  Susceptibility data from last 90 days.  Collected Specimen Info Organism Clindamycin Erythromycin Oxacillin Tetracycline Trimethoprim/Sulfamethoxazole Vancomycin   07/17/24 Tissue/Biopsy from Wound/Tissue Methicillin Susceptible Staphylococcus aureus (MSSA)  S  S  S  S  S  S     Mixed Gram-Positive and Gram-Negative Bacteria               Imaging    XR chest 1 view    Result Date: 8/22/2024  Interpreted By:  Hugo James, STUDY: XR CHEST 1 VIEW;  8/22/2024 10:45 am   INDICATION: Signs/Symptoms:elevated BP.   COMPARISON: None.   ACCESSION NUMBER(S): [id number]   ORDERING CLINICIAN: BAN ALANIS   FINDINGS: CARDIOMEDIASTINAL SILHOUETTE AND VASCULATURE:   Cardiac size:  Within normal limits. Aortic shadow:  Within normal limits.   Mediastinal contours: Within normal limits.   Pulmonary vasculature:  The central vasculature is unremarkable   LUNGS: Lungs are clear.   ABDOMEN AND OTHER FINDINGS: No remarkable upper abdominal findings.   BONES: No acute osseous changes.       1.  No active cardiopulmonary disease.   Signed by: Hugo James 8/22/2024 11:21 AM Dictation workstation:   [id number]    XR tibia fibula right 2 views    Result Date: 8/22/2024  Interpreted By:  Hugo James, STUDY: XR TIBIA FIBULA RIGHT 2 VIEWS;  8/22/2024 10:45 am   INDICATION: Signs/Symptoms:right leg ulcer.    COMPARISON: None.   ACCESSION NUMBER(S): [id number]   ORDERING CLINICIAN: BAN ALANIS   FINDINGS: Bony structures:  Staples are present at the tibial apophysis, with medullary tunnel to the subchondral surface presumably from previous anterior cruciate repair. Bony structures otherwise appear intact without acute fractures.   Joint spaces:  Maintained   Soft tissues:  There is lacy subcutaneous calcification of the distal calf particularly posteriorly, which may be due to venous insufficiency, or possibly less likely etiologies such as dermatomyositis.   Other:  None significant       No acute findings.   MACRO: None   Signed by: Hugo James 8/22/2024 11:21 AM Dictation workstation:   [id number]      Assessment/Plan   Right leg cellulitis  Right leg Chronic venous stasis ulcer-rule out infection  Venous insufficieny   Leukocytosis   Multiple antibiotic intolerances and allergies   History of MSSA, pseudomonas       IV cefepime-tolerating with no untoward effect   IV vancomycin  Monitor temperature and WBC  Follow wound culture  Follow blood culture  Local care-per vascular surgery   Vascular [id number] to undergo venous studies due to size of ulcer, consider pyoderma gangrenosum work up  Further recommendations based on workup    Total time spent caring for the patient today was 35 minutes. This includes time spent before the visit reviewing the chart, time spent during the visit, and time spent after the visit on documentation.     DEVANG Walton-CNP

## 2024-08-23 NOTE — CARE PLAN
The patient's goals for the shift include      The clinical goals for the shift include pain control and patient safety    Over the shift, the patient did not make progress toward the following goals. Barriers to progression include None. Recommendations to address these barriers include none.

## 2024-08-23 NOTE — PROGRESS NOTES
Vancomycin Dosing by Pharmacy- INITIAL    Saba Hurtado is a 58 y.o. year old female who Pharmacy has been consulted for vancomycin dosing for cellulitis, skin and soft tissue. Based on the patient's indication and renal status this patient will be dosed based on a goal AUC of 400-600.     Renal function is currently stable.    Visit Vitals  /85 (BP Location: Left arm, Patient Position: Lying)   Pulse 80   Temp 36.9 °C (98.4 °F) (Oral)   Resp 18        Lab Results   Component Value Date    CREATININE 0.70 2024    CREATININE 0.8 2023    CREATININE 0.7 2022    CREATININE 0.8 10/21/2021    CREATININE 0.7 2021        Patient weight is as follows:   Vitals:    24 0959   Weight: 141 kg (310 lb)       Cultures:  No results found for the encounter in last 14 days.        I/O last 3 completed shifts:  In: 2450 (17.4 mL/kg) [IV Piggyback:2450]  Out: - (0 mL/kg)   Weight: 140.6 kg   I/O during current shift:  No intake/output data recorded.    Temp (24hrs), Av °C (98.6 °F), Min:36.6 °C (97.9 °F), Max:37.3 °C (99.1 °F)         Assessment/Plan     Patient will not be given a loading dose.  Will initiate vancomycin maintenance,  2000 mg every 12 hours.    This dosing regimen is predicted by [employer] to result in the following pharmacokinetic parameters:  Loading dose: N/A  Regimen: 2000 mg IV every 12 hours.  Start time: 12:42 on 2024  Exposure target: AUC24 (range)400-600 mg/L.hr   AUC24,ss: 423 mg/L.hr  Probability of AUC24 > 400: 56 %  Ctrough,ss: 10.5 mg/L  Probability of Ctrough,ss > 20: 10 %  Probability of nephrotoxicity (Lodise LEXUS ): 6 %      Follow-up level will be ordered on  at 0500 unless clinically indicated sooner.  Will continue to monitor renal function daily while on vancomycin and order serum creatinine at least every 48 hours if not already ordered.  Follow for continued vancomycin needs, clinical response, and signs/symptoms of toxicity.       Yovany VICKERS  Vishal, PharmD

## 2024-08-23 NOTE — NURSING NOTE
Assumed care of patient.  Patient sitting on the side of the bed at this time.  BSSR completed.   at bedside.  Bed low and call light within reach.

## 2024-08-23 NOTE — ASSESSMENT & PLAN NOTE
Mrs. Saba Hurtado is a 58 y.o. female with a PMH of morbid obesity, prediabetes, PETTY not on CPAP, chronic venous insufficiency, hypertension, asthma, who is  admitted to the internal medicine service for choric venous ulcer, right leg cellulitis and  hypertension.     Right leg cellulitis with chronic right leg wound  -Patient with chronic venous insufficiency and chronic right leg wound which appears to be more painful than in the past.  No evidence of necrotic tissue.    X-ray of the right tib-fib with no acute findings.  Continue on cefepime, vancomycin, which was started in the ER.  -Follow the blood cultures.  -Consult ID, wound care nurse.  -Consult vascular surgery given patient's venous insufficiency and chronic wound and with acute cellulitis.  -Patient's  is at the bedside and all of their questions were answered.     Elevated Blood pressure/ hypertension  -Patient reports SBP is typically 170s to 190s in outpatient clinics but typically SBP is 140s to 150s at home and is not taking BP medications as she states that BP medications have dropped her blood pressure at home in the past.  -Patient was given hydralazine 10 mg IV in the ER and BP is  has improved.    -Monitor.      Prediabetes  -Diabetes is listed in the chart and patient reports having history of prediabetes.  HbA1c 6.7  Continue to monitor on SSI,      Morbid obesity  -Diet modification and exercise recommended.     Asthma  -Appears to be stable at this time.  Can give albuterol as needed and monitor.     PETTY  -PETTY as listed in the chart and patient denies using CPAP at home.  Recommend follow-up with PCP and sleep medicine as an outpatient.     DVT prophylaxis  -Lovenox subcu.      Dispo: Improvement in infection. Pending cultures.

## 2024-08-24 ENCOUNTER — PHARMACY VISIT (OUTPATIENT)
Dept: PHARMACY | Facility: CLINIC | Age: 59
End: 2024-08-24
Payer: COMMERCIAL

## 2024-08-24 VITALS
RESPIRATION RATE: 18 BRPM | TEMPERATURE: 98.4 F | DIASTOLIC BLOOD PRESSURE: 82 MMHG | HEART RATE: 71 BPM | BODY MASS INDEX: 43.4 KG/M2 | HEIGHT: 69 IN | OXYGEN SATURATION: 98 % | SYSTOLIC BLOOD PRESSURE: 164 MMHG | WEIGHT: 293 LBS

## 2024-08-24 LAB
ALBUMIN SERPL-MCNC: 4 G/DL (ref 3.5–5)
ALP BLD-CCNC: 84 U/L (ref 35–125)
ALT SERPL-CCNC: 12 U/L (ref 5–40)
ANION GAP SERPL CALC-SCNC: 11 MMOL/L
ANION GAP SERPL CALC-SCNC: 15 MMOL/L
AST SERPL-CCNC: 19 U/L (ref 5–40)
BILIRUB SERPL-MCNC: 0.7 MG/DL (ref 0.1–1.2)
BUN SERPL-MCNC: 21 MG/DL (ref 8–25)
BUN SERPL-MCNC: 21 MG/DL (ref 8–25)
CALCIUM SERPL-MCNC: 9 MG/DL (ref 8.5–10.4)
CALCIUM SERPL-MCNC: 9.3 MG/DL (ref 8.5–10.4)
CHLORIDE SERPL-SCNC: 104 MMOL/L (ref 97–107)
CHLORIDE SERPL-SCNC: 105 MMOL/L (ref 97–107)
CO2 SERPL-SCNC: 15 MMOL/L (ref 24–31)
CO2 SERPL-SCNC: 21 MMOL/L (ref 24–31)
CREAT SERPL-MCNC: 0.7 MG/DL (ref 0.4–1.6)
CREAT SERPL-MCNC: 0.7 MG/DL (ref 0.4–1.6)
EGFRCR SERPLBLD CKD-EPI 2021: >90 ML/MIN/1.73M*2
EGFRCR SERPLBLD CKD-EPI 2021: >90 ML/MIN/1.73M*2
ERYTHROCYTE [DISTWIDTH] IN BLOOD BY AUTOMATED COUNT: 12.5 % (ref 11.5–14.5)
GLUCOSE BLD MANUAL STRIP-MCNC: 110 MG/DL (ref 74–99)
GLUCOSE BLD MANUAL STRIP-MCNC: 113 MG/DL (ref 74–99)
GLUCOSE SERPL-MCNC: 124 MG/DL (ref 65–99)
GLUCOSE SERPL-MCNC: 158 MG/DL (ref 65–99)
HCT VFR BLD AUTO: 45.6 % (ref 36–46)
HGB BLD-MCNC: 14.6 G/DL (ref 12–16)
MCH RBC QN AUTO: 29.3 PG (ref 26–34)
MCHC RBC AUTO-ENTMCNC: 32 G/DL (ref 32–36)
MCV RBC AUTO: 92 FL (ref 80–100)
NRBC BLD-RTO: 0 /100 WBCS (ref 0–0)
PLATELET # BLD AUTO: 305 X10*3/UL (ref 150–450)
POTASSIUM SERPL-SCNC: 4 MMOL/L (ref 3.4–5.1)
POTASSIUM SERPL-SCNC: 4.3 MMOL/L (ref 3.4–5.1)
PROT SERPL-MCNC: 7.2 G/DL (ref 5.9–7.9)
RBC # BLD AUTO: 4.98 X10*6/UL (ref 4–5.2)
SODIUM SERPL-SCNC: 134 MMOL/L (ref 133–145)
SODIUM SERPL-SCNC: 137 MMOL/L (ref 133–145)
VANCOMYCIN SERPL-MCNC: 26.8 UG/ML (ref 10–20)
WBC # BLD AUTO: 12.8 X10*3/UL (ref 4.4–11.3)

## 2024-08-24 PROCEDURE — 36415 COLL VENOUS BLD VENIPUNCTURE: CPT | Performed by: INTERNAL MEDICINE

## 2024-08-24 PROCEDURE — 82947 ASSAY GLUCOSE BLOOD QUANT: CPT

## 2024-08-24 PROCEDURE — 85027 COMPLETE CBC AUTOMATED: CPT | Performed by: INTERNAL MEDICINE

## 2024-08-24 PROCEDURE — 80202 ASSAY OF VANCOMYCIN: CPT

## 2024-08-24 PROCEDURE — 80053 COMPREHEN METABOLIC PANEL: CPT | Performed by: INTERNAL MEDICINE

## 2024-08-24 PROCEDURE — 2500000004 HC RX 250 GENERAL PHARMACY W/ HCPCS (ALT 636 FOR OP/ED): Mod: JZ | Performed by: INTERNAL MEDICINE

## 2024-08-24 PROCEDURE — G0378 HOSPITAL OBSERVATION PER HR: HCPCS

## 2024-08-24 PROCEDURE — 2500000001 HC RX 250 WO HCPCS SELF ADMINISTERED DRUGS (ALT 637 FOR MEDICARE OP): Performed by: INTERNAL MEDICINE

## 2024-08-24 PROCEDURE — 82374 ASSAY BLOOD CARBON DIOXIDE: CPT | Performed by: INTERNAL MEDICINE

## 2024-08-24 PROCEDURE — RXMED WILLOW AMBULATORY MEDICATION CHARGE

## 2024-08-24 PROCEDURE — 99239 HOSP IP/OBS DSCHRG MGMT >30: CPT | Performed by: INTERNAL MEDICINE

## 2024-08-24 PROCEDURE — 2500000004 HC RX 250 GENERAL PHARMACY W/ HCPCS (ALT 636 FOR OP/ED): Mod: JZ | Performed by: PHYSICIAN ASSISTANT

## 2024-08-24 RX ORDER — CEPHALEXIN 500 MG/1
500 CAPSULE ORAL EVERY 6 HOURS
Qty: 40 CAPSULE | Refills: 0 | Status: SHIPPED | OUTPATIENT
Start: 2024-08-24 | End: 2024-09-03

## 2024-08-24 RX ADMIN — IBUPROFEN 800 MG: 400 TABLET ORAL at 06:31

## 2024-08-24 RX ADMIN — VANCOMYCIN 2 G: 2 INJECTION, SOLUTION INTRAVENOUS at 00:53

## 2024-08-24 RX ADMIN — CEFEPIME HYDROCHLORIDE 1 G: 1 INJECTION, SOLUTION INTRAVENOUS at 05:09

## 2024-08-24 RX ADMIN — CEFEPIME HYDROCHLORIDE 1 G: 1 INJECTION, SOLUTION INTRAVENOUS at 13:52

## 2024-08-24 RX ADMIN — ALPRAZOLAM 1 MG: 0.5 TABLET ORAL at 11:36

## 2024-08-24 ASSESSMENT — COGNITIVE AND FUNCTIONAL STATUS - GENERAL
DRESSING REGULAR LOWER BODY CLOTHING: A LITTLE
MOBILITY SCORE: 23
DAILY ACTIVITIY SCORE: 23
CLIMB 3 TO 5 STEPS WITH RAILING: A LITTLE

## 2024-08-24 ASSESSMENT — PAIN - FUNCTIONAL ASSESSMENT: PAIN_FUNCTIONAL_ASSESSMENT: 0-10

## 2024-08-24 ASSESSMENT — PAIN SCALES - GENERAL
PAINLEVEL_OUTOF10: 6
PAINLEVEL_OUTOF10: 6

## 2024-08-24 NOTE — DISCHARGE SUMMARY
"Discharge Diagnosis  Cellulitis of right lower extremity    Issues Requiring Follow-Up  PCP follow up,   Dermatology follow up.     Discharge Meds     Your medication list        START taking these medications        Instructions Last Dose Given Next Dose Due   cephalexin 500 mg capsule  Commonly known as: Keflex      Take 1 capsule (500 mg) by mouth every 6 hours for 10 days.              CONTINUE taking these medications        Instructions Last Dose Given Next Dose Due   albuterol 90 mcg/actuation inhaler  Commonly known as: Ventolin HFA      Inhale 2 puffs every 4 hours if needed for wheezing or shortness of breath.       ProAir HFA 90 mcg/actuation inhaler  Generic drug: albuterol      Inhale 2 puffs every 6 hours if needed for wheezing. USE AS DIRECTED       ALPRAZolam 0.5 mg tablet  Commonly known as: Xanax      Take 1-2 tablets (0.5-1 mg) by mouth 2 times a day. Do not fill before July 27, 2024.       blood pressure test kit-wrist kit      OMRON 7 Series wrist cuff - tests BP as needed       Coban 4 X 5 \"-yard bandage  Generic drug: elastic bandage      Apply 1 Units topically once daily.       Flonase Allergy Relief 50 mcg/actuation nasal spray  Generic drug: fluticasone      Administer 2 sprays into each nostril once daily.       FreeStyle Test strip  Generic drug: blood sugar diagnostic           furosemide 20 mg tablet  Commonly known as: Lasix      Take 1 tablet (20 mg) by mouth once daily as needed (edema).       hydrocortisone 1 % cream      1 APPLICATION EXTERNALLY TWICE A DAY 30 DAYS       Kerlix 4 1/2 X 147 \" bandage  Generic drug: gauze bandage      Apply 1 Units topically once daily.       ketoconazole 2 % shampoo  Commonly known as: NIZOral      Apply 1 Application topically 2 times a day.       ketoconazole 2 % cream  Commonly known as: NIZOral      Apply topically 2 times a day.       lancets 30 gauge misc           OneTouch Delica Plus Lancet 30 gauge misc  Generic drug: lancets      1 Units " "by in vitro route 2 times a day.  USE AS DIRECTED       mupirocin 2 % ointment  Commonly known as: Bactroban      APPLY TO AFFECTED AREA TWICE DAILY UNTIL HEALED       Oil Emulsion Dressing 3 X 8 \" bandage  Generic drug: non-adher band-sunflowr-petrol      Apply 1 Units topically once daily.       omeprazole 20 mg DR capsule  Commonly known as: PriLOSEC      TAKE 1 CAPSULE BY MOUTH 30 MINUTES BEFORE MORNING MEAL ONCE A DAY FOR 90 DAY(S)       OneTouch Ultra Control solution  Generic drug: blood glucose control, normal      1 Units by in vitro route if needed (control). AS DIRECTED IN VITRO DAILY AS NEEDEDAS DIRECTED IN VITRO DAILY AS NEEDED       OneTouch Ultra2 Meter misc  Generic drug: blood-glucose meter      AS DIRECTED       Saline NasaL 0.65 % nasal spray  Generic drug: sodium chloride      Administer 2 sprays into each nostril once daily as needed for congestion.       Vivelle-Dot 0.1 mg/24 hr patch  Generic drug: estradiol      Place 1 patch on the skin 2 times a week.              STOP taking these medications      ibuprofen 800 mg tablet                  Where to Get Your Medications        These medications were sent to [facility]  [address], [address], [facility] [address]      Hours: 9 AM to 6 PM Mon-Fri, 9 AM to 1 PM Sat Phone: [phone]   cephalexin 500 mg capsule         Test Results Pending At Discharge  Pending Labs       Order Current Status    Extra Urine Gray Tube Collected (08/22/24 1216)    Urinalysis with Reflex Culture and Microscopic In process    Basic metabolic panel Preliminary result    Blood Culture Preliminary result    Blood Culture Preliminary result    Tissue/Wound Culture/Smear Preliminary result            Hospital Course    Mrs. Saba Hurtado is a 58 y.o. female with a PMH of morbid obesity BMI of 45, prediabetes, PETTY not on CPAP, chronic venous insufficiency, hypertension, asthma, who was admitted to the internal medicine service for choric venous " ulcer, right leg cellulitis.      Right leg cellulitis with chronic right leg wound  -Patient with chronic venous insufficiency and chronic right leg wound which appears to be more painful than in the past.  No evidence of necrotic tissue.    X-ray of the right tib-fib with no acute findings.  Blood cultures, no growth  ID consulted, appreciate recs.   Wound cultures abundant gram negative bacilli, occ gram positive cocci  Advised keflex x 10 days course,   PCP follow up  Wound dressings provided,      Elevated Blood pressure/ hypertension  -Patient reports SBP is typically 170s to 190s in outpatient clinics but typically SBP is 140s to 150s at home  Continue home meds,      Prediabetes  -Diabetes is listed in the chart and patient reports having history of prediabetes.  HbA1c 6.7  Advised diet modification,      Morbid obesity  -Diet modification and exercise recommended.     Asthma  -Appears to be stable at this time.  Can give albuterol as needed and monitor.     PETTY  -PETTY as listed in the chart and patient denies using CPAP at home.    Recommend follow-up with PCP and sleep medicine as an outpatient.    Discharged home in a stable condition. Advised follow up with PCP and Dermatology.   Discharge day management time > 30 minutes.          Pertinent Physical Exam At Time of Discharge  Vitals:    08/24/24 0741   BP: 164/82   Pulse: 71   Resp: 18   Temp: 36.9 °C (98.4 °F)   SpO2: 98%     Physical Exam  Constitutional:       Appearance: She is obese.   HENT:      Head: Normocephalic.      Nose: Nose normal.   Eyes:      Extraocular Movements: Extraocular movements intact.      Pupils: Pupils are equal, round, and reactive to light.   Cardiovascular:      Rate and Rhythm: Normal rate and regular rhythm.      Heart sounds: Normal heart sounds. No murmur heard.  Pulmonary:      Effort: No respiratory distress.      Breath sounds: Normal breath sounds. No wheezing.   Abdominal:      General: There is no distension.       Palpations: Abdomen is soft.      Tenderness: There is no abdominal tenderness.   Musculoskeletal:         General: Normal range of motion.      Cervical back: Normal range of motion.      Right lower leg: Edema present.      Left lower leg: Edema present.   Skin:     Comments: RLE, ulcer noted around the right leg in the lower aspect above the ankle,      Neurological:      General: No focal deficit present.      Mental Status: She is alert and oriented to person, place, and time.   Psychiatric:         Mood and Affect: Mood normal.        Outpatient Follow-Up  No future appointments.      Davis Lyon MD

## 2024-08-24 NOTE — CARE PLAN
The patient's goals for the shift include      The clinical goals for the shift include pain control, wound care      Problem: Diabetes  Goal: Increase stability of blood glucose readings by end of shift  Outcome: Progressing  Goal: Learn about and adhere to nutrition recommendations by end of shift  Outcome: Progressing  Goal: Vital signs within normal range for age by end of shift  Outcome: Progressing  Goal: Increase self care and/or family involovement by end of shift  Outcome: Progressing  Goal: Receive DSME education by end of shift  Outcome: Progressing     Problem: Pain - Adult  Goal: Verbalizes/displays adequate comfort level or baseline comfort level  Outcome: Progressing     Problem: Discharge Planning  Goal: Discharge to home or other facility with appropriate resources  Outcome: Progressing     Problem: Chronic Conditions and Co-morbidities  Goal: Patient's chronic conditions and co-morbidity symptoms are monitored and maintained or improved  Outcome: Progressing     Problem: Fall/Injury  Goal: Verbalize understanding of risk factor reduction measures to prevent injury from fall in the home  Outcome: Progressing     Problem: Fall/Injury  Goal: Pace activities to prevent fatigue by end of the shift  Outcome: Met

## 2024-08-24 NOTE — CARE PLAN
The patient's goals for the shift include      The clinical goals for the shift include paint control wound care    Over the shift, the patient did not make progress toward the following goals. Barriers to progression include none. Recommendations to address these barriers include none.

## 2024-08-24 NOTE — PROGRESS NOTES
Mrs. Saba Hurtado is a 58 y.o. female on day 2 of admission presenting with Cellulitis of right lower extremity.    Subjective   Interval History:   Afebrile, no chills  Wants to go home  No right leg pain          Review of Systems   All other systems reviewed and are negative.      Objective   Range of Vitals (last 24 hours)  Heart Rate:  [71-82]   Temp:  [36.6 °C (97.8 °F)-36.9 °C (98.4 °F)]   Resp:  [17-18]   BP: (148-197)/(68-94)   SpO2:  [97 %-98 %]   Daily Weight  08/22/24 : 141 kg (310 lb)    Body mass index is 45.78 kg/m².    Physical Exam  Constitutional:       Appearance: Normal appearance.   HENT:      Head: Normocephalic and atraumatic.      Nose: Nose normal.      Mouth/Throat:      Mouth: Mucous membranes are moist.      Pharynx: Oropharynx is clear.   Eyes:      General: No scleral icterus.  Cardiovascular:      Rate and Rhythm: Normal rate and regular rhythm.   Pulmonary:      Effort: Pulmonary effort is normal.      Breath sounds: Normal breath sounds.   Abdominal:      General: Bowel sounds are normal.      Palpations: Abdomen is soft.   Musculoskeletal:         General: Normal range of motion.      Cervical back: Normal range of motion and neck supple.   Skin:     General: Skin is warm and dry.      Comments: Right leg ulcer with large amount of granular tissue, some  slough and exudate. tenderness with palpation.  No malodor.  No periwound erythema.  Moderate serous drainage noted.   Neurological:      General: No focal deficit present.      Mental Status: She is alert and oriented to person, place, and time. Mental status is at baseline.   Psychiatric:         Mood and Affect: Mood normal.         Behavior: Behavior normal.        Antibiotics  cefepime - 1 gram/50 mL  mupirocin - 2 %  vancomycin - 2 gram/400 mL    Relevant Results  Labs  Results from last 72 hours   Lab Units 08/24/24  0557 08/23/24  0555 08/22/24  1046   WBC AUTO x10*3/uL 12.8* 12.5* 11.9*   HEMOGLOBIN g/dL 14.6  14.5 14.4   HEMATOCRIT % 45.6 43.5 44.1   PLATELETS AUTO x10*3/uL 305 309 313   NEUTROS PCT AUTO %  --   --  70.8   LYMPHS PCT AUTO %  --   --  19.4   MONOS PCT AUTO %  --   --  6.4   EOS PCT AUTO %  --   --  2.1     Results from last 72 hours   Lab Units 08/24/24  0557 08/23/24  0555 08/22/24  1046   SODIUM mmol/L 134 141 138   POTASSIUM mmol/L 4.3 4.0 3.8   CHLORIDE mmol/L 104 107 103   CO2 mmol/L 15* 18* 25   BUN mg/dL 21 17 15   CREATININE mg/dL 0.70 0.70 0.70   GLUCOSE mg/dL 124* 129* 156*   CALCIUM mg/dL 9.3 9.3 9.2   ANION GAP mmol/L 15 16 10   EGFR mL/min/1.73m*2 >90 >90 >90   PHOSPHORUS mg/dL  --  3.2  --      Results from last 72 hours   Lab Units 08/24/24  0557 08/23/24  0555 08/22/24  1046   ALK PHOS U/L 84  --  98   BILIRUBIN TOTAL mg/dL 0.7  --  0.5   PROTEIN TOTAL g/dL 7.2  --  7.3   ALT U/L 12  --  14   AST U/L 19  --  13   ALBUMIN g/dL 4.0 4.0 4.1     Estimated Creatinine Clearance: 125 mL/min (by C-G formula based on SCr of 0.7 mg/dL).  C-Reactive Protein   Date Value Ref Range Status   08/22/2024 1.40 0.00 - 2.00 mg/dL Final     Microbiology  Reviewed-cultures pending at time of discharge  Imaging  XR chest 1 view    Result Date: 8/22/2024  Interpreted By:  Hugo James, STUDY: XR CHEST 1 VIEW;  8/22/2024 10:45 am   INDICATION: Signs/Symptoms:elevated BP.   COMPARISON: None.   ACCESSION NUMBER(S): [id number]   ORDERING CLINICIAN: BAN ALANIS   FINDINGS: CARDIOMEDIASTINAL SILHOUETTE AND VASCULATURE:   Cardiac size:  Within normal limits. Aortic shadow:  Within normal limits.   Mediastinal contours: Within normal limits.   Pulmonary vasculature:  The central vasculature is unremarkable   LUNGS: Lungs are clear.   ABDOMEN AND OTHER FINDINGS: No remarkable upper abdominal findings.   BONES: No acute osseous changes.       1.  No active cardiopulmonary disease.   Signed by: Hugo James 8/22/2024 11:21 AM Dictation workstation:   [id number]    XR tibia fibula right 2 views    Result Date:  8/22/2024  Interpreted By:  Hugo James, STUDY: XR TIBIA FIBULA RIGHT 2 VIEWS;  8/22/2024 10:45 am   INDICATION: Signs/Symptoms:right leg ulcer.   COMPARISON: None.   ACCESSION NUMBER(S): [id number]   ORDERING CLINICIAN: BAN ALANIS   FINDINGS: Bony structures:  Staples are present at the tibial apophysis, with medullary tunnel to the subchondral surface presumably from previous anterior cruciate repair. Bony structures otherwise appear intact without acute fractures.   Joint spaces:  Maintained   Soft tissues:  There is lacy subcutaneous calcification of the distal calf particularly posteriorly, which may be due to venous insufficiency, or possibly less likely etiologies such as dermatomyositis.   Other:  None significant       No acute findings.   MACRO: None   Signed by: uHgo James 8/22/2024 11:21 AM Dictation workstation:   [id number]    Assessment/Plan   Right leg cellulitis  Right leg Chronic venous stasis ulcer-rule out infection  Venous insufficieny   Leukocytosis   Multiple antibiotic intolerances and allergies   History of MSSA, pseudomonas         IV cefepime-tolerating with no untoward effect   IV vancomycin  Monitor temperature and WBC  Follow wound culture  Follow blood culture  Local care-per vascular surgery   Vascular [id number] to undergo venous studies due to size of ulcer, consider pyoderma gangrenosum work up  Discharge on cephalexin, aware that regimen can be adjusted based on final culture result    Randy Tanner MD

## 2024-08-24 NOTE — PROGRESS NOTES
Vancomycin Dosing by Pharmacy- FOLLOW UP    Saba Hurtado is a 58 y.o. year old female who Pharmacy has been consulted for vancomycin dosing for cellulitis, skin and soft tissue. Based on the patient's indication and renal status this patient is being dosed based on a goal AUC of 400-600.     Renal function is currently stable.    Current vancomycin dose: 2000 mg given every 12 hours    Estimated vancomycin AUC on current dose: 554 mg/L.hr     Visit Vitals  /82 (BP Location: Left arm, Patient Position: Lying)   Pulse 71   Temp 36.9 °C (98.4 °F) (Oral)   Resp 18        Lab Results   Component Value Date    CREATININE 0.70 2024    CREATININE 0.70 2024    CREATININE 0.70 2024    CREATININE 0.8 2023    CREATININE 0.7 2022    CREATININE 0.8 10/21/2021    CREATININE 0.7 2021        Patient weight is as follows:   Vitals:    24 0959   Weight: 141 kg (310 lb)       Cultures:  No results found for the encounter in last 14 days.       I/O last 3 completed shifts:  In: 450 (3.2 mL/kg) [IV Piggyback:450]  Out: - (0 mL/kg)   Weight: 140.6 kg   I/O during current shift:  No intake/output data recorded.    Temp (24hrs), Av.7 °C (98 °F), Min:36.6 °C (97.8 °F), Max:36.9 °C (98.4 °F)      Assessment/Plan    Within goal AUC range. Continue current vancomycin regimen.    This dosing regimen is predicted by [employer] to result in the following pharmacokinetic parameters:  <<<<<InsightRx DATA>>>>>  Loading dose: N/A  Regimen: 2000 mg IV every 12 hours.  Start time: 12:53 on 2024  Exposure target: AUC24 (range)400-600 mg/L.hr   AUC24,ss: 554 mg/L.hr  Probability of AUC24 > 400: 96 %  Ctrough,ss: 12.4 mg/L  Probability of Ctrough,ss > 20: 22 %  Probability of nephrotoxicity (Lodise LEXUS ): 8 %    The next level will be obtained on 08/30 at 0500. May be obtained sooner if clinically indicated.   Will continue to monitor renal function daily while on vancomycin and order serum  creatinine at least every 48 hours if not already ordered.  Follow for continued vancomycin needs, clinical response, and signs/symptoms of toxicity.       ANYA Velasco, PharmD

## 2024-08-24 NOTE — CARE PLAN
No referral for Care Coordination  Received a call from nurse Ninoska Kapoor; pt is discharged and she and her  need a ride home.  Phone [facility] and the shuttle will cover both the pt and her spouse. Informed Nurse Ninoska. She will call [facility] once she has the discharge order      DISCHARGE PLAN: HOME

## 2024-08-25 DIAGNOSIS — E11.9 DIABETES MELLITUS WITHOUT COMPLICATION (MULTI): ICD-10-CM

## 2024-08-25 LAB
B-LACTAMASE ORGANISM ISLT: POSITIVE
BACTERIA BLD CULT: NORMAL
BACTERIA BLD CULT: NORMAL
BACTERIA SPEC CULT: ABNORMAL
BACTERIA SPEC CULT: ABNORMAL
GRAM STN SPEC: ABNORMAL

## 2024-08-26 ENCOUNTER — PATIENT OUTREACH (OUTPATIENT)
Dept: PRIMARY CARE | Facility: CLINIC | Age: 59
End: 2024-08-26
Payer: COMMERCIAL

## 2024-08-26 ENCOUNTER — DOCUMENTATION (OUTPATIENT)
Dept: PRIMARY CARE | Facility: CLINIC | Age: 59
End: 2024-08-26
Payer: COMMERCIAL

## 2024-08-26 ENCOUNTER — TELEPHONE (OUTPATIENT)
Dept: INPATIENT UNIT | Facility: HOSPITAL | Age: 59
End: 2024-08-26
Payer: COMMERCIAL

## 2024-08-26 LAB
BACTERIA BLD CULT: NORMAL
BACTERIA BLD CULT: NORMAL

## 2024-08-26 NOTE — PROGRESS NOTES
Discharge Facility: [facility]  Discharge Diagnosis: Cellulitis of right lower extremity  Admission Date: 08/22/2024  Discharge Date: 08/24/2024    PCP Appointment Date:   Specialist Appointment Date: None  Hospital Encounter and Summary Linked: Yes    See discharge assessment below for further details    Medications  Medications reviewed with patient/caregiver?: Yes (8/26/2024  9:26 AM)  Is the patient having any side effects they believe may be caused by any medication additions or changes?: No (8/26/2024  9:26 AM)  Does the patient have all medications ordered at discharge?: Yes (8/26/2024  9:26 AM)  Prescription Comments: Script given at discharge for Keflex (8/26/2024  9:26 AM)  Is the patient taking all medications as directed (includes completed medication regime)?: Yes (8/26/2024  9:26 AM)  Medication Comments: Patient denies any issues obtaining or affording medication (8/26/2024  9:26 AM)    Appointments  Does the patient have a primary care provider?: Yes (8/26/2024  9:26 AM)  Care Management Interventions: -- (Tasked to office) (8/26/2024  9:26 AM)  Has the patient kept scheduled appointments due by today?: Yes (8/26/2024  9:26 AM)    Self Management  What is the home health agency?: N/A (8/26/2024  9:26 AM)  What Durable Medical Equipment (DME) was ordered?: N/A (8/26/2024  9:26 AM)    Patient Teaching  Does the patient have access to their discharge instructions?: Yes (8/26/2024  9:26 AM)  Care Management Interventions: Reviewed instructions with patient (8/26/2024  9:26 AM)  What is the patient's perception of their health status since discharge?: Improving (8/26/2024  9:26 AM)  Is the patient/caregiver able to teach back the hierarchy of who to call/visit for symptoms/problems? PCP, Specialist, Home Health nurse, Urgent Care, ED, 911: Yes (8/26/2024  9:26 AM)  Patient/Caregiver Education Comments: CM spoke to patient via phone. She states that she is doing okay at home. She has her discharge medication  at the home. She is requesting dressing supplies, this CM will contact the office. PCP follow up appointment has been tasked to the office, patient states that she will cll and scheduled. She was thankful for this call. (8/26/2024  9:26 AM)

## 2024-08-27 ENCOUNTER — TELEPHONE (OUTPATIENT)
Dept: HEMATOLOGY/ONCOLOGY | Facility: HOSPITAL | Age: 59
End: 2024-08-27
Payer: COMMERCIAL

## 2024-08-27 RX ORDER — LANCETS 33 GAUGE
EACH MISCELLANEOUS
Qty: 100 EACH | Refills: 3 | Status: SHIPPED | OUTPATIENT
Start: 2024-08-27

## 2024-08-27 RX ORDER — BLOOD SUGAR DIAGNOSTIC
STRIP MISCELLANEOUS
Qty: 200 STRIP | Refills: 3 | Status: SHIPPED | OUTPATIENT
Start: 2024-08-27

## 2024-08-27 NOTE — TELEPHONE ENCOUNTER
Received antibiotic orders from Dr. Tanner. Patient called after hours and left a voicemail, requesting a call back. Reached out to patient this morning. Per patient, this order is to be cancelled. Ordering office and patient have decided to go another route. Patient will be taking antibiotics orally. So, IV infusions is not needed. Per patient, order is to be cancelled and appointments with the infusion center are not needed. Patient verbalized understanding and agreement regarding discussed information via verbal feedback.

## 2024-09-05 DIAGNOSIS — E11.9 DIABETES MELLITUS WITHOUT COMPLICATION (MULTI): Primary | ICD-10-CM

## 2024-09-09 ENCOUNTER — PATIENT OUTREACH (OUTPATIENT)
Dept: PRIMARY CARE | Facility: CLINIC | Age: 59
End: 2024-09-09
Payer: COMMERCIAL

## 2024-09-09 ENCOUNTER — TELEPHONE (OUTPATIENT)
Dept: PRIMARY CARE | Facility: CLINIC | Age: 59
End: 2024-09-09
Payer: COMMERCIAL

## 2024-09-09 NOTE — TELEPHONE ENCOUNTER
"  Pt requesting refill on   Vivelle-Dot 0.1 mg/24 hr patch - She states this medication needs to be brand name as the generic version \"rips her skin off\"     >Requesting 90 day supply     "
0

## 2024-09-12 ENCOUNTER — OFFICE VISIT (OUTPATIENT)
Dept: PRIMARY CARE | Facility: CLINIC | Age: 59
End: 2024-09-12
Payer: COMMERCIAL

## 2024-09-12 DIAGNOSIS — I83.003 VENOUS STASIS ULCER OF ANKLE WITH OTHER ULCER SEVERITY, UNSPECIFIED LATERALITY, UNSPECIFIED WHETHER VARICOSE VEINS PRESENT (MULTI): Primary | ICD-10-CM

## 2024-09-12 DIAGNOSIS — N95.1 MENOPAUSAL SYMPTOMS: ICD-10-CM

## 2024-09-12 DIAGNOSIS — I87.2 CHRONIC VENOUS INSUFFICIENCY: ICD-10-CM

## 2024-09-12 DIAGNOSIS — Z12.11 ENCOUNTER FOR SCREENING FOR MALIGNANT NEOPLASM OF COLON: ICD-10-CM

## 2024-09-12 DIAGNOSIS — Z12.31 BREAST CANCER SCREENING BY MAMMOGRAM: ICD-10-CM

## 2024-09-12 DIAGNOSIS — L97.308 VENOUS STASIS ULCER OF ANKLE WITH OTHER ULCER SEVERITY, UNSPECIFIED LATERALITY, UNSPECIFIED WHETHER VARICOSE VEINS PRESENT (MULTI): Primary | ICD-10-CM

## 2024-09-12 PROCEDURE — 3044F HG A1C LEVEL LT 7.0%: CPT | Performed by: PHYSICIAN ASSISTANT

## 2024-09-12 PROCEDURE — 99214 OFFICE O/P EST MOD 30 MIN: CPT | Performed by: PHYSICIAN ASSISTANT

## 2024-09-12 PROCEDURE — 3075F SYST BP GE 130 - 139MM HG: CPT | Performed by: PHYSICIAN ASSISTANT

## 2024-09-12 PROCEDURE — 3008F BODY MASS INDEX DOCD: CPT | Performed by: PHYSICIAN ASSISTANT

## 2024-09-12 PROCEDURE — 3079F DIAST BP 80-89 MM HG: CPT | Performed by: PHYSICIAN ASSISTANT

## 2024-09-12 RX ORDER — IBUPROFEN 800 MG/1
800 TABLET ORAL EVERY 8 HOURS PRN
COMMUNITY
End: 2024-09-16

## 2024-09-12 RX ORDER — ESTRADIOL 0.1 MG/D
1 PATCH, EXTENDED RELEASE TRANSDERMAL 2 TIMES WEEKLY
Qty: 8 PATCH | Refills: 3 | Status: SHIPPED | OUTPATIENT
Start: 2024-09-12 | End: 2024-09-17 | Stop reason: SDUPTHER

## 2024-09-12 ASSESSMENT — PAIN SCALES - GENERAL: PAINLEVEL: 7

## 2024-09-12 NOTE — PROGRESS NOTES
"Subjective   Patient ID: Mrs. Saba Hurtado is a 59 y.o. female who presents for Hospital Follow-up.    Presents for follow up - has had hospitalization for cellulitis and wound infection. Wound is improving some with some newer skin.   Current regimen for wound care:  sterile water rinse debrided with sterile 4x4s gauze sponges followed by oil emulsion dressing, abd pads, kerlix/gauze rolls, conforming stretch gauze with velcro. Uses gloves for dressing changes.   More frequent changes have helped it heal though still in process.     States prior auth was denied , needs amount of wounds, size of wounds, reason for overage. She has been changing dressings frequently - 4 times a day but running out of supplies.              Review of Systems   All other systems reviewed and are negative.      Objective   BP (!) 160/92   Pulse 78   Ht 1.727 m (5' 8\")   Wt 142 kg (312 lb)   SpO2 97%   BMI 47.44 kg/m²     Physical Exam  Constitutional:       Appearance: Normal appearance. She is obese.   HENT:      Mouth/Throat:      Pharynx: Oropharynx is clear.   Cardiovascular:      Heart sounds: Normal heart sounds.   Pulmonary:      Breath sounds: Normal breath sounds.   Skin:     Comments: RLE wounds: anterior wounds [id number]; 4x3cm; 2x2cm; posterior wound;: [id number] healing scabs, subcutaneous tissue scabbing. See attached media  LLE: scabbed healing wounds with mild edema   Neurological:      Mental Status: She is alert.       Dressings changed: debrided wound with oil emulsion dressing, abd pads, sterile gauze compression, ace bandage.     Assessment/Plan   Diagnoses and all orders for this visit:  Venous stasis ulcer of ankle with other ulcer severity, unspecified laterality, unspecified whether varicose veins present (Multi)  Menopausal symptoms  -     Vivelle-Dot 0.1 mg/24 hr patch; Place 1 patch on the skin 2 times a week.  Chronic venous insufficiency  Breast cancer screening by mammogram  -     BI mammo " bilateral screening tomosynthesis; Future  Encounter for screening for malignant neoplasm of colon  -     Cologuard® colon cancer screening; Future    Follow up short term

## 2024-09-13 VITALS
DIASTOLIC BLOOD PRESSURE: 88 MMHG | HEIGHT: 68 IN | SYSTOLIC BLOOD PRESSURE: 138 MMHG | BODY MASS INDEX: 44.41 KG/M2 | OXYGEN SATURATION: 97 % | HEART RATE: 78 BPM | WEIGHT: 293 LBS

## 2024-09-15 DIAGNOSIS — M13.0 POLYARTHRITIS: ICD-10-CM

## 2024-09-15 DIAGNOSIS — M25.473 ANKLE EDEMA: Primary | ICD-10-CM

## 2024-09-16 RX ORDER — IBUPROFEN 800 MG/1
TABLET ORAL
Qty: 90 TABLET | Refills: 3 | Status: SHIPPED | OUTPATIENT
Start: 2024-09-16 | End: 2024-09-19 | Stop reason: SDUPTHER

## 2024-09-17 DIAGNOSIS — N95.1 MENOPAUSAL SYMPTOMS: ICD-10-CM

## 2024-09-17 RX ORDER — ESTRADIOL 0.1 MG/D
1 PATCH, EXTENDED RELEASE TRANSDERMAL 2 TIMES WEEKLY
Qty: 24 PATCH | Refills: 3 | Status: SHIPPED | OUTPATIENT
Start: 2024-09-19 | End: 2025-09-19

## 2024-09-18 DIAGNOSIS — L21.0 SEBORRHEA CAPITIS: ICD-10-CM

## 2024-09-18 DIAGNOSIS — R05.9 COUGH, UNSPECIFIED TYPE: ICD-10-CM

## 2024-09-18 DIAGNOSIS — M13.0 POLYARTHRITIS: ICD-10-CM

## 2024-09-18 NOTE — TELEPHONE ENCOUNTER
"Spoke with Stephen as well as patient insurance company. They state an   \"[employer]\" is not covered due to having a set amount that they send out no exceptions. Asked if home wound care was covered and they stated it would be with the codes 48506 ICD: Z48.00. Left message for patient stating information above and to call and let us know if she would like [facility] to place home wound care order.   "

## 2024-09-19 RX ORDER — FLUTICASONE PROPIONATE 50 MCG
2 SPRAY, SUSPENSION (ML) NASAL DAILY
Qty: 16 G | Refills: 11 | Status: SHIPPED | OUTPATIENT
Start: 2024-09-19

## 2024-09-19 RX ORDER — KETOCONAZOLE 20 MG/ML
1 SHAMPOO, SUSPENSION TOPICAL 2 TIMES DAILY
Qty: 360 ML | Refills: 3 | Status: SHIPPED | OUTPATIENT
Start: 2024-09-19

## 2024-09-19 RX ORDER — KETOCONAZOLE 20 MG/G
CREAM TOPICAL 2 TIMES DAILY
Qty: 90 G | Refills: 3 | Status: SHIPPED | OUTPATIENT
Start: 2024-09-19

## 2024-09-19 RX ORDER — IBUPROFEN 800 MG/1
800 TABLET ORAL 3 TIMES DAILY
Qty: 90 TABLET | Refills: 3 | Status: SHIPPED | OUTPATIENT
Start: 2024-09-19

## 2024-09-19 NOTE — TELEPHONE ENCOUNTER
Spoke with patient and she stated she is does not want home care and is in need of these RX. She would also like  RX sent to [facility] for 400 Sterile water code 6250 and 400 Gauze code 4005. She would also like to order ace wrap through the pharmacy if possible. The quality she get from [facility] is not great.

## 2024-09-21 DIAGNOSIS — E03.8 OTHER SPECIFIED HYPOTHYROIDISM: Primary | ICD-10-CM

## 2024-09-23 RX ORDER — LEVOTHYROXINE SODIUM 50 UG/1
TABLET ORAL
Qty: 90 TABLET | Refills: 1 | Status: SHIPPED | OUTPATIENT
Start: 2024-09-23

## 2024-09-26 DIAGNOSIS — L97.308 VENOUS STASIS ULCER OF ANKLE WITH OTHER ULCER SEVERITY, UNSPECIFIED LATERALITY, UNSPECIFIED WHETHER VARICOSE VEINS PRESENT (MULTI): ICD-10-CM

## 2024-09-26 DIAGNOSIS — I87.2 CHRONIC VENOUS INSUFFICIENCY: ICD-10-CM

## 2024-09-26 DIAGNOSIS — I83.003 VENOUS STASIS ULCER OF ANKLE WITH OTHER ULCER SEVERITY, UNSPECIFIED LATERALITY, UNSPECIFIED WHETHER VARICOSE VEINS PRESENT (MULTI): ICD-10-CM

## 2024-09-27 RX ORDER — FUROSEMIDE 20 MG/1
TABLET ORAL
Qty: 90 TABLET | Refills: 1 | Status: SHIPPED | OUTPATIENT
Start: 2024-09-27

## 2024-10-02 ENCOUNTER — PATIENT OUTREACH (OUTPATIENT)
Dept: PRIMARY CARE | Facility: CLINIC | Age: 59
End: 2024-10-02
Payer: COMMERCIAL

## 2024-10-09 DIAGNOSIS — F41.9 ANXIETY: ICD-10-CM

## 2024-10-09 RX ORDER — ALPRAZOLAM 0.5 MG/1
0.5-1 TABLET ORAL 2 TIMES DAILY
Qty: 120 TABLET | Refills: 0 | Status: CANCELLED | OUTPATIENT
Start: 2024-10-09 | End: 2024-11-08

## 2024-10-10 ENCOUNTER — APPOINTMENT (OUTPATIENT)
Dept: PRIMARY CARE | Facility: CLINIC | Age: 59
End: 2024-10-10
Payer: COMMERCIAL

## 2024-10-23 ENCOUNTER — OFFICE VISIT (OUTPATIENT)
Dept: PRIMARY CARE | Facility: CLINIC | Age: 59
End: 2024-10-23
Payer: COMMERCIAL

## 2024-10-23 ENCOUNTER — APPOINTMENT (OUTPATIENT)
Dept: RADIOLOGY | Facility: HOSPITAL | Age: 59
End: 2024-10-23
Payer: COMMERCIAL

## 2024-10-23 VITALS
HEART RATE: 77 BPM | DIASTOLIC BLOOD PRESSURE: 90 MMHG | OXYGEN SATURATION: 97 % | BODY MASS INDEX: 44.41 KG/M2 | WEIGHT: 293 LBS | SYSTOLIC BLOOD PRESSURE: 168 MMHG | HEIGHT: 68 IN

## 2024-10-23 DIAGNOSIS — F41.9 ANXIETY: ICD-10-CM

## 2024-10-23 DIAGNOSIS — S81.802D OPEN WOUND OF LEFT LOWER LEG, SUBSEQUENT ENCOUNTER: ICD-10-CM

## 2024-10-23 DIAGNOSIS — I83.003 VENOUS STASIS ULCER OF ANKLE WITH OTHER ULCER SEVERITY, UNSPECIFIED LATERALITY, UNSPECIFIED WHETHER VARICOSE VEINS PRESENT (MULTI): Primary | ICD-10-CM

## 2024-10-23 DIAGNOSIS — I87.2 CHRONIC VENOUS INSUFFICIENCY: ICD-10-CM

## 2024-10-23 DIAGNOSIS — M13.0 POLYARTHRITIS: ICD-10-CM

## 2024-10-23 DIAGNOSIS — L97.308 VENOUS STASIS ULCER OF ANKLE WITH OTHER ULCER SEVERITY, UNSPECIFIED LATERALITY, UNSPECIFIED WHETHER VARICOSE VEINS PRESENT (MULTI): Primary | ICD-10-CM

## 2024-10-23 PROCEDURE — 1036F TOBACCO NON-USER: CPT | Performed by: PHYSICIAN ASSISTANT

## 2024-10-23 PROCEDURE — 99214 OFFICE O/P EST MOD 30 MIN: CPT | Performed by: PHYSICIAN ASSISTANT

## 2024-10-23 PROCEDURE — 3044F HG A1C LEVEL LT 7.0%: CPT | Performed by: PHYSICIAN ASSISTANT

## 2024-10-23 PROCEDURE — 87077 CULTURE AEROBIC IDENTIFY: CPT | Mod: WESLAB | Performed by: PHYSICIAN ASSISTANT

## 2024-10-23 PROCEDURE — 3080F DIAST BP >= 90 MM HG: CPT | Performed by: PHYSICIAN ASSISTANT

## 2024-10-23 PROCEDURE — 3008F BODY MASS INDEX DOCD: CPT | Performed by: PHYSICIAN ASSISTANT

## 2024-10-23 PROCEDURE — 3077F SYST BP >= 140 MM HG: CPT | Performed by: PHYSICIAN ASSISTANT

## 2024-10-23 RX ORDER — IBUPROFEN 800 MG/1
800 TABLET ORAL 3 TIMES DAILY
Qty: 90 TABLET | Refills: 3 | Status: SHIPPED | OUTPATIENT
Start: 2024-10-23

## 2024-10-23 RX ORDER — ALPRAZOLAM 0.5 MG/1
0.5-1 TABLET ORAL 2 TIMES DAILY
Qty: 120 TABLET | Refills: 2 | Status: SHIPPED | OUTPATIENT
Start: 2024-10-25

## 2024-10-23 ASSESSMENT — PATIENT HEALTH QUESTIONNAIRE - PHQ9
2. FEELING DOWN, DEPRESSED OR HOPELESS: NOT AT ALL
SUM OF ALL RESPONSES TO PHQ9 QUESTIONS 1 AND 2: 0
1. LITTLE INTEREST OR PLEASURE IN DOING THINGS: NOT AT ALL

## 2024-10-23 ASSESSMENT — PAIN SCALES - GENERAL: PAINLEVEL_OUTOF10: 8

## 2024-10-23 NOTE — PROGRESS NOTES
"Subjective   Patient ID: Mrs. Saba Hurtado is a 59 y.o. female who presents for Wound Care.    Presents for follow up - continues with B/L LE wounds. Swelling is going down with ongoing compression.   Has had continued pain to RLE which feels very sore at times, sharp pains, described as burning, scraping pain.     Anxiety - stable on alprazolam for years without adverse effects.     Continues with home wrapping of oil emulsion dressing, abd pads, conforming gauze and ACE.          Review of Systems   All other systems reviewed and are negative.      Objective   /90   Pulse 77   Ht 1.727 m (5' 8\")   Wt 142 kg (312 lb)   SpO2 97%   BMI 47.44 kg/m²     Physical Exam  Constitutional:       Appearance: Normal appearance. She is obese.   HENT:      Mouth/Throat:      Pharynx: Oropharynx is clear.   Cardiovascular:      Heart sounds: Normal heart sounds.   Pulmonary:      Breath sounds: Normal breath sounds.   Skin:     Comments: RLE   Number of wounds: 1) beefy red anterior wound 15cm x leg circumference (50cm), surrounding leg posterior wound; nondebrided venous stasis ulcer; copious serosanguinous drainage; stage 2-3 with dermis exposed complete epidermal loss.   See attached media.        Neurological:      Mental Status: She is alert.         Assessment/Plan   Diagnoses and all orders for this visit:  Venous stasis ulcer of ankle with other ulcer severity, unspecified laterality, unspecified whether varicose veins present (Multi)  -     Tissue/Wound Culture/Smear  Anxiety  -     ALPRAZolam (Xanax) 0.5 mg tablet; Take 1-2 tablets (0.5-1 mg) by mouth 2 times a day. Do not fill before October 25, 2024.  Chronic venous insufficiency  Open wound of left lower leg, subsequent encounter  -     General supply request ABD PAD 8X10 RLE APPLY TWO PADS, LLE APPLY ONE PAD; CHANGE QID  Polyarthritis  -     ibuprofen 800 mg tablet; Take 1 tablet (800 mg) by mouth 3 times a day.    This is healing, though " slowly.     Due to size of wound, sensitivity to certain material, and amount of drainage she will require enough supplies to change this dressing 4 times/day. The recommended dressing for her is as follows:    Oil Emulsion dressing to cover the open wound followed by ABD pads, conforming gauze and ACE bandages.     She is to continue current medication and compression, elevation.     Follow up in 1 month. If wound culture negative consider derm appt for consult.

## 2024-10-28 ENCOUNTER — PATIENT MESSAGE (OUTPATIENT)
Dept: PRIMARY CARE | Facility: CLINIC | Age: 59
End: 2024-10-28
Payer: COMMERCIAL

## 2024-10-28 ENCOUNTER — TELEPHONE (OUTPATIENT)
Dept: PRIMARY CARE | Facility: CLINIC | Age: 59
End: 2024-10-28
Payer: COMMERCIAL

## 2024-10-28 DIAGNOSIS — I87.2: ICD-10-CM

## 2024-10-28 DIAGNOSIS — L97.308 VENOUS STASIS ULCER OF ANKLE WITH OTHER ULCER SEVERITY, UNSPECIFIED LATERALITY, UNSPECIFIED WHETHER VARICOSE VEINS PRESENT (MULTI): Primary | ICD-10-CM

## 2024-10-28 DIAGNOSIS — I83.003 VENOUS STASIS ULCER OF ANKLE WITH OTHER ULCER SEVERITY, UNSPECIFIED LATERALITY, UNSPECIFIED WHETHER VARICOSE VEINS PRESENT (MULTI): Primary | ICD-10-CM

## 2024-10-28 DIAGNOSIS — L97.919: ICD-10-CM

## 2024-10-28 RX ORDER — LEVOFLOXACIN 500 MG/1
500 TABLET, FILM COATED ORAL DAILY
Qty: 10 TABLET | Refills: 0 | Status: SHIPPED | OUTPATIENT
Start: 2024-10-28 | End: 2024-11-07

## 2024-11-07 ENCOUNTER — PATIENT OUTREACH (OUTPATIENT)
Dept: PRIMARY CARE | Facility: CLINIC | Age: 59
End: 2024-11-07
Payer: COMMERCIAL

## 2024-11-12 PROBLEM — S81.801A OPEN WOUND OF RIGHT LOWER EXTREMITY WITH COMPLICATION: Status: ACTIVE | Noted: 2024-11-12

## 2024-11-13 ENCOUNTER — LAB (OUTPATIENT)
Dept: LAB | Facility: LAB | Age: 59
End: 2024-11-13
Payer: COMMERCIAL

## 2024-11-13 DIAGNOSIS — L03.115 CELLULITIS OF RIGHT LOWER LIMB: Primary | ICD-10-CM

## 2024-11-13 LAB
ALBUMIN SERPL BCP-MCNC: 4.2 G/DL (ref 3.4–5)
ALP SERPL-CCNC: 72 U/L (ref 33–110)
ALT SERPL W P-5'-P-CCNC: 14 U/L (ref 7–45)
ANION GAP SERPL CALC-SCNC: 12 MMOL/L (ref 10–20)
AST SERPL W P-5'-P-CCNC: 15 U/L (ref 9–39)
BASOPHILS # BLD AUTO: 0.05 X10*3/UL (ref 0–0.1)
BASOPHILS NFR BLD AUTO: 0.5 %
BILIRUB SERPL-MCNC: 0.6 MG/DL (ref 0–1.2)
BUN SERPL-MCNC: 17 MG/DL (ref 6–23)
CALCIUM SERPL-MCNC: 9.2 MG/DL (ref 8.6–10.3)
CHLORIDE SERPL-SCNC: 103 MMOL/L (ref 98–107)
CO2 SERPL-SCNC: 28 MMOL/L (ref 21–32)
CREAT SERPL-MCNC: 0.71 MG/DL (ref 0.5–1.05)
EGFRCR SERPLBLD CKD-EPI 2021: >90 ML/MIN/1.73M*2
EOSINOPHIL # BLD AUTO: 0.29 X10*3/UL (ref 0–0.7)
EOSINOPHIL NFR BLD AUTO: 2.7 %
ERYTHROCYTE [DISTWIDTH] IN BLOOD BY AUTOMATED COUNT: 12.9 % (ref 11.5–14.5)
GLUCOSE SERPL-MCNC: 151 MG/DL (ref 74–99)
HCT VFR BLD AUTO: 43.8 % (ref 36–46)
HGB BLD-MCNC: 14.2 G/DL (ref 12–16)
IMM GRANULOCYTES # BLD AUTO: 0.1 X10*3/UL (ref 0–0.7)
IMM GRANULOCYTES NFR BLD AUTO: 0.9 % (ref 0–0.9)
LYMPHOCYTES # BLD AUTO: 2.59 X10*3/UL (ref 1.2–4.8)
LYMPHOCYTES NFR BLD AUTO: 24.5 %
MCH RBC QN AUTO: 29.4 PG (ref 26–34)
MCHC RBC AUTO-ENTMCNC: 32.4 G/DL (ref 32–36)
MCV RBC AUTO: 91 FL (ref 80–100)
MONOCYTES # BLD AUTO: 0.69 X10*3/UL (ref 0.1–1)
MONOCYTES NFR BLD AUTO: 6.5 %
NEUTROPHILS # BLD AUTO: 6.83 X10*3/UL (ref 1.2–7.7)
NEUTROPHILS NFR BLD AUTO: 64.9 %
NRBC BLD-RTO: 0 /100 WBCS (ref 0–0)
PLATELET # BLD AUTO: 337 X10*3/UL (ref 150–450)
POTASSIUM SERPL-SCNC: 4.1 MMOL/L (ref 3.5–5.3)
PROT SERPL-MCNC: 7 G/DL (ref 6.4–8.2)
RBC # BLD AUTO: 4.83 X10*6/UL (ref 4–5.2)
SODIUM SERPL-SCNC: 139 MMOL/L (ref 136–145)
WBC # BLD AUTO: 10.6 X10*3/UL (ref 4.4–11.3)

## 2024-11-13 PROCEDURE — 87186 SC STD MICRODIL/AGAR DIL: CPT

## 2024-11-13 PROCEDURE — 87185 SC STD ENZYME DETCJ PER NZM: CPT

## 2024-11-13 PROCEDURE — 87205 SMEAR GRAM STAIN: CPT

## 2024-11-13 PROCEDURE — 87075 CULTR BACTERIA EXCEPT BLOOD: CPT

## 2024-11-13 PROCEDURE — 36415 COLL VENOUS BLD VENIPUNCTURE: CPT

## 2024-11-13 PROCEDURE — 87077 CULTURE AEROBIC IDENTIFY: CPT

## 2024-11-13 PROCEDURE — 87070 CULTURE OTHR SPECIMN AEROBIC: CPT

## 2024-11-16 LAB
B-LACTAMASE ORGANISM ISLT: NEGATIVE
BACTERIA SPEC CULT: ABNORMAL
BACTERIA SPEC CULT: ABNORMAL
GRAM STN SPEC: ABNORMAL
GRAM STN SPEC: ABNORMAL

## 2024-11-18 DIAGNOSIS — L03.115 CELLULITIS OF RIGHT LOWER EXTREMITY: Primary | ICD-10-CM

## 2024-11-18 LAB
B-LACTAMASE ORGANISM ISLT: NEGATIVE
BACTERIA SPEC CULT: ABNORMAL
BACTERIA SPEC CULT: ABNORMAL
CARBA5 NEG: ABNORMAL
GRAM STN SPEC: ABNORMAL
GRAM STN SPEC: ABNORMAL

## 2024-11-18 RX ORDER — EPINEPHRINE 0.3 MG/.3ML
0.3 INJECTION SUBCUTANEOUS EVERY 5 MIN PRN
Status: CANCELLED | OUTPATIENT
Start: 2024-11-19

## 2024-11-18 RX ORDER — DIPHENHYDRAMINE HYDROCHLORIDE 50 MG/ML
50 INJECTION INTRAMUSCULAR; INTRAVENOUS AS NEEDED
Status: CANCELLED | OUTPATIENT
Start: 2024-11-19

## 2024-11-18 RX ORDER — ALBUTEROL SULFATE 0.83 MG/ML
3 SOLUTION RESPIRATORY (INHALATION) AS NEEDED
Status: CANCELLED | OUTPATIENT
Start: 2024-11-19

## 2024-11-18 RX ORDER — FAMOTIDINE 10 MG/ML
20 INJECTION INTRAVENOUS ONCE AS NEEDED
Status: CANCELLED | OUTPATIENT
Start: 2024-11-19

## 2024-11-19 ENCOUNTER — TELEPHONE (OUTPATIENT)
Dept: VASCULAR SURGERY | Facility: HOSPITAL | Age: 59
End: 2024-11-19
Payer: COMMERCIAL

## 2024-11-19 DIAGNOSIS — I87.2 CHRONIC VENOUS INSUFFICIENCY: Primary | ICD-10-CM

## 2024-11-19 DIAGNOSIS — L03.115 CELLULITIS OF RIGHT LOWER LIMB: ICD-10-CM

## 2024-11-19 NOTE — TELEPHONE ENCOUNTER
Patient requesting a call back with the name of the doc Dr. Barney referred her to and name of the imaging she ordered for chronic venous insufficiency.     Pt phone: [phone]     Dr. Barney and Ellen Horton NP notified.     DEVANG Nolen-CNP  Vascular Surgery

## 2024-11-22 ENCOUNTER — APPOINTMENT (OUTPATIENT)
Dept: RADIOLOGY | Facility: HOSPITAL | Age: 59
End: 2024-11-22
Payer: COMMERCIAL

## 2024-11-27 ENCOUNTER — LAB (OUTPATIENT)
Dept: LAB | Facility: LAB | Age: 59
End: 2024-11-27
Payer: COMMERCIAL

## 2024-11-27 DIAGNOSIS — L03.115 CELLULITIS OF RIGHT LOWER LIMB: Primary | ICD-10-CM

## 2024-11-27 PROCEDURE — 87075 CULTR BACTERIA EXCEPT BLOOD: CPT

## 2024-11-27 PROCEDURE — 87205 SMEAR GRAM STAIN: CPT

## 2024-11-27 PROCEDURE — 87070 CULTURE OTHR SPECIMN AEROBIC: CPT

## 2024-11-29 LAB
BACTERIA SPEC CULT: ABNORMAL
GRAM STN SPEC: ABNORMAL
GRAM STN SPEC: ABNORMAL

## 2024-12-04 ENCOUNTER — HOSPITAL ENCOUNTER (OUTPATIENT)
Dept: VASCULAR MEDICINE | Facility: HOSPITAL | Age: 59
Discharge: HOME | End: 2024-12-04
Payer: COMMERCIAL

## 2024-12-04 DIAGNOSIS — I87.2 CHRONIC VENOUS INSUFFICIENCY: ICD-10-CM

## 2024-12-04 DIAGNOSIS — L03.115 CELLULITIS OF RIGHT LOWER LIMB: ICD-10-CM

## 2024-12-04 PROCEDURE — 93970 EXTREMITY STUDY: CPT | Performed by: INTERNAL MEDICINE

## 2024-12-04 PROCEDURE — 93970 EXTREMITY STUDY: CPT

## 2024-12-05 ENCOUNTER — APPOINTMENT (OUTPATIENT)
Dept: VASCULAR MEDICINE | Facility: HOSPITAL | Age: 59
End: 2024-12-05
Payer: COMMERCIAL

## 2024-12-06 DIAGNOSIS — E11.9 DIABETES MELLITUS WITHOUT COMPLICATION (MULTI): ICD-10-CM

## 2024-12-06 DIAGNOSIS — N95.1 MENOPAUSAL SYMPTOMS: ICD-10-CM

## 2024-12-06 RX ORDER — BLOOD SUGAR DIAGNOSTIC
1 STRIP MISCELLANEOUS 2 TIMES DAILY
Qty: 200 STRIP | Refills: 3 | Status: SHIPPED | OUTPATIENT
Start: 2024-12-06

## 2024-12-06 RX ORDER — BLOOD-GLUCOSE CONTROL, NORMAL
1 EACH MISCELLANEOUS 2 TIMES DAILY
Qty: 100 EACH | Refills: 3 | Status: SHIPPED | OUTPATIENT
Start: 2024-12-06

## 2024-12-06 RX ORDER — ESTRADIOL 0.1 MG/D
1 PATCH, EXTENDED RELEASE TRANSDERMAL 2 TIMES WEEKLY
Qty: 24 PATCH | Refills: 3 | Status: SHIPPED | OUTPATIENT
Start: 2024-12-09 | End: 2025-12-09

## 2024-12-06 RX ORDER — MEDICAL SUPPLY, MISCELLANEOUS
1 EACH MISCELLANEOUS AS NEEDED
Qty: 1 EACH | Refills: 1 | Status: SHIPPED | OUTPATIENT
Start: 2024-12-06

## 2024-12-10 ENCOUNTER — TELEPHONE (OUTPATIENT)
Dept: VASCULAR SURGERY | Facility: HOSPITAL | Age: 59
End: 2024-12-10
Payer: COMMERCIAL

## 2024-12-10 NOTE — TELEPHONE ENCOUNTER
"Patient called asking about her upcoming appt with Dr. Barney on Thursday.  She wants to know:  Will Dr. Barney want to see her wound.   Will Dr. Barney need to debride her wound.     Patient takes public transportation and is concerned that if wound is debrided she will not have enough time, given \"20 min time slot\" and/or it will bleed profusely and the bus will not allow her back on.     Told her that someone will try to call her before her appointment.     She's asking that someone leave a detailed message regarding the questions above.       [phone] pt number.         "

## 2024-12-12 ENCOUNTER — APPOINTMENT (OUTPATIENT)
Dept: VASCULAR SURGERY | Facility: CLINIC | Age: 59
End: 2024-12-12
Payer: COMMERCIAL

## 2024-12-13 DIAGNOSIS — R05.9 COUGH, UNSPECIFIED TYPE: ICD-10-CM

## 2024-12-13 RX ORDER — ALBUTEROL SULFATE 90 UG/1
2 INHALANT RESPIRATORY (INHALATION) EVERY 4 HOURS PRN
Qty: 8.5 G | Refills: 1 | Status: SHIPPED | OUTPATIENT
Start: 2024-12-13

## 2024-12-18 ENCOUNTER — OFFICE VISIT (OUTPATIENT)
Dept: PRIMARY CARE | Facility: CLINIC | Age: 59
End: 2024-12-18
Payer: COMMERCIAL

## 2024-12-18 VITALS
OXYGEN SATURATION: 97 % | WEIGHT: 293 LBS | BODY MASS INDEX: 43.4 KG/M2 | HEIGHT: 69 IN | HEART RATE: 79 BPM | SYSTOLIC BLOOD PRESSURE: 128 MMHG | DIASTOLIC BLOOD PRESSURE: 78 MMHG

## 2024-12-18 DIAGNOSIS — S81.801D OPEN WOUND OF RIGHT LOWER EXTREMITY WITH COMPLICATION, SUBSEQUENT ENCOUNTER: Primary | ICD-10-CM

## 2024-12-18 DIAGNOSIS — M13.0 POLYARTHRITIS: ICD-10-CM

## 2024-12-18 DIAGNOSIS — L97.919: ICD-10-CM

## 2024-12-18 DIAGNOSIS — N95.1 MENOPAUSAL SYMPTOMS: ICD-10-CM

## 2024-12-18 DIAGNOSIS — I87.2: ICD-10-CM

## 2024-12-18 PROCEDURE — 3074F SYST BP LT 130 MM HG: CPT | Performed by: PHYSICIAN ASSISTANT

## 2024-12-18 PROCEDURE — 1036F TOBACCO NON-USER: CPT | Performed by: PHYSICIAN ASSISTANT

## 2024-12-18 PROCEDURE — 3044F HG A1C LEVEL LT 7.0%: CPT | Performed by: PHYSICIAN ASSISTANT

## 2024-12-18 PROCEDURE — 3078F DIAST BP <80 MM HG: CPT | Performed by: PHYSICIAN ASSISTANT

## 2024-12-18 PROCEDURE — 99214 OFFICE O/P EST MOD 30 MIN: CPT | Performed by: PHYSICIAN ASSISTANT

## 2024-12-18 PROCEDURE — 3008F BODY MASS INDEX DOCD: CPT | Performed by: PHYSICIAN ASSISTANT

## 2024-12-18 RX ORDER — CLINDAMYCIN HYDROCHLORIDE 300 MG/1
300 CAPSULE ORAL 3 TIMES DAILY
COMMUNITY
Start: 2024-07-30

## 2024-12-18 RX ORDER — IBUPROFEN 800 MG/1
800 TABLET ORAL 3 TIMES DAILY
Qty: 90 TABLET | Refills: 3 | Status: SHIPPED | OUTPATIENT
Start: 2024-12-18

## 2024-12-18 RX ORDER — ESTRADIOL 0.1 MG/D
1 PATCH, EXTENDED RELEASE TRANSDERMAL 2 TIMES WEEKLY
Qty: 24 PATCH | Refills: 3 | Status: SHIPPED | OUTPATIENT
Start: 2024-12-19 | End: 2025-12-19

## 2024-12-18 ASSESSMENT — PAIN SCALES - GENERAL: PAINLEVEL_OUTOF10: 0-NO PAIN

## 2024-12-18 NOTE — LETTER
December 18, 2024     Patient: Saba Hurtado   YOB: 1965   Date of Visit: 12/18/2024       To Whom It May Concern:    Saba Hurtado was seen in my clinic on 12/18/2024 at 2:00 pm.     Due to her chronic wounds and medical conditions she has to supplement supplies by purchasing over the counter items for her health.     Please contact us with any questions.        Sincerely,         Constantine Gomez PA-C

## 2024-12-19 DIAGNOSIS — R05.9 COUGH, UNSPECIFIED TYPE: ICD-10-CM

## 2024-12-19 RX ORDER — VITAMIN A 3000 MCG
CAPSULE ORAL
Qty: 44 ML | Refills: 11 | Status: SHIPPED | OUTPATIENT
Start: 2024-12-19

## 2024-12-31 ENCOUNTER — TELEPHONE (OUTPATIENT)
Dept: PRIMARY CARE | Facility: CLINIC | Age: 59
End: 2024-12-31
Payer: COMMERCIAL

## 2025-01-14 ENCOUNTER — OFFICE VISIT (OUTPATIENT)
Dept: PRIMARY CARE | Facility: CLINIC | Age: 60
End: 2025-01-14
Payer: COMMERCIAL

## 2025-01-14 VITALS
HEART RATE: 83 BPM | OXYGEN SATURATION: 98 % | BODY MASS INDEX: 43.4 KG/M2 | HEIGHT: 69 IN | DIASTOLIC BLOOD PRESSURE: 100 MMHG | SYSTOLIC BLOOD PRESSURE: 180 MMHG | WEIGHT: 293 LBS

## 2025-01-14 DIAGNOSIS — L97.919: ICD-10-CM

## 2025-01-14 DIAGNOSIS — F41.9 ANXIETY: ICD-10-CM

## 2025-01-14 DIAGNOSIS — I83.003 VENOUS STASIS ULCER OF ANKLE WITH OTHER ULCER SEVERITY, UNSPECIFIED LATERALITY, UNSPECIFIED WHETHER VARICOSE VEINS PRESENT (MULTI): Primary | ICD-10-CM

## 2025-01-14 DIAGNOSIS — S81.801D OPEN WOUND OF RIGHT LOWER EXTREMITY WITH COMPLICATION, SUBSEQUENT ENCOUNTER: ICD-10-CM

## 2025-01-14 DIAGNOSIS — L97.308 VENOUS STASIS ULCER OF ANKLE WITH OTHER ULCER SEVERITY, UNSPECIFIED LATERALITY, UNSPECIFIED WHETHER VARICOSE VEINS PRESENT (MULTI): Primary | ICD-10-CM

## 2025-01-14 DIAGNOSIS — E11.9 DIABETES MELLITUS WITHOUT COMPLICATION (MULTI): ICD-10-CM

## 2025-01-14 DIAGNOSIS — I87.2: ICD-10-CM

## 2025-01-14 DIAGNOSIS — I10 ESSENTIAL HYPERTENSION: ICD-10-CM

## 2025-01-14 PROCEDURE — 3077F SYST BP >= 140 MM HG: CPT | Performed by: PHYSICIAN ASSISTANT

## 2025-01-14 PROCEDURE — 3008F BODY MASS INDEX DOCD: CPT | Performed by: PHYSICIAN ASSISTANT

## 2025-01-14 PROCEDURE — 87205 SMEAR GRAM STAIN: CPT | Performed by: PHYSICIAN ASSISTANT

## 2025-01-14 PROCEDURE — 99214 OFFICE O/P EST MOD 30 MIN: CPT | Performed by: PHYSICIAN ASSISTANT

## 2025-01-14 PROCEDURE — 3080F DIAST BP >= 90 MM HG: CPT | Performed by: PHYSICIAN ASSISTANT

## 2025-01-14 RX ORDER — ALPRAZOLAM 0.5 MG/1
0.5-1 TABLET ORAL 2 TIMES DAILY
Qty: 120 TABLET | Refills: 2 | Status: SHIPPED | OUTPATIENT
Start: 2025-01-18

## 2025-01-14 RX ORDER — LEVOFLOXACIN 750 MG/1
750 TABLET ORAL EVERY 24 HOURS
Qty: 14 TABLET | Refills: 0 | Status: SHIPPED | OUTPATIENT
Start: 2025-01-14 | End: 2025-01-15 | Stop reason: ALTCHOICE

## 2025-01-14 RX ORDER — LANCETS 30 GAUGE
1 EACH MISCELLANEOUS DAILY
Qty: 1 EACH | Refills: 1 | Status: SHIPPED | OUTPATIENT
Start: 2025-01-14

## 2025-01-14 RX ORDER — LEVOFLOXACIN 500 MG/1
500 TABLET, FILM COATED ORAL DAILY
Qty: 14 TABLET | Refills: 0 | Status: SHIPPED | OUTPATIENT
Start: 2025-01-14 | End: 2025-01-14 | Stop reason: SDUPTHER

## 2025-01-14 ASSESSMENT — PAIN SCALES - GENERAL: PAINLEVEL_OUTOF10: 10-WORST PAIN EVER

## 2025-01-14 NOTE — PROGRESS NOTES
"Subjective   Patient ID: Mrs. Saba Hurtado is a 59 y.o. female who presents for Follow-up (Patient states the wound has gotten worse over the last month. /8x10 Dukal ABD/Alprazolam refill /Wipes, bandages, antibiotic ointment. Q-TIPS, healing ointment, gloves, hand soap and Vaseline ).    Presents for follow up - continues with B/L LE wounds.   Has had worsening skin changes since last visit - increased blistering and increased sensitivity.    Has had continued pain to RLE which feels very sore at times, sharp pains, described as burning, scraping pain.     Anxiety - stable on alprazolam for years without adverse effects. Prior failure of lorazepam and diazepam due to allergy.     Continues with home wrapping of oil emulsion dressing, abd pads, conforming gauze and ACE.     Has higher cost on her own as there has been coverage issues. Due to issues with compression wraps and gauze has used a below knee compression wrap size 3XL which has helped.    Wound has extended in size and needs a larger size ABD pad.          Review of Systems   All other systems reviewed and are negative.      Objective   BP (!) 180/100   Pulse 83   Ht 1.74 m (5' 8.5\")   Wt 137 kg (303 lb)   SpO2 98%   BMI 45.40 kg/m²     Physical Exam  Constitutional:       Appearance: Normal appearance. She is obese.   HENT:      Mouth/Throat:      Pharynx: Oropharynx is clear.   Cardiovascular:      Rate and Rhythm: Normal rate and regular rhythm.   Pulmonary:      Breath sounds: Normal breath sounds.   Skin:            Comments: RLE   Number of wounds: 1) red anterior wound 18cm x leg circumference (50cm), surrounding leg posterior wound; nondebrided venous stasis ulcer; copious serosanguinous drainage; stage 2-3 with dermis exposed complete epidermal loss.   See attached media.   Various width - 13-17cm x 50cm       Neurological:      Mental Status: She is alert.         Assessment/Plan   Diagnoses and all orders for this " visit:  Venous stasis ulcer of ankle with other ulcer severity, unspecified laterality, unspecified whether varicose veins present (Multi)  Venous ulcer of right lower extremity without varicose veins (Multi)  Open wound of right lower extremity with complication, subsequent encounter  -     levoFLOXacin (Levaquin) 750 mg tablet; Take 1 tablet (750 mg) by mouth once every 24 hours for 14 days.  Diabetes mellitus without complication (Multi)  -     OneTouch Ultra2 Meter misc; 1 Units by in vitro route once daily.  Essential hypertension      [id number] - 8x10 Dukal sterile ABD pad due to increase in wound size.   Change dressing 5 times/day.    Keep elevated

## 2025-01-15 ENCOUNTER — TELEPHONE (OUTPATIENT)
Dept: VASCULAR SURGERY | Facility: HOSPITAL | Age: 60
End: 2025-01-15

## 2025-01-15 ENCOUNTER — OFFICE VISIT (OUTPATIENT)
Dept: VASCULAR SURGERY | Facility: CLINIC | Age: 60
End: 2025-01-15
Payer: COMMERCIAL

## 2025-01-15 VITALS
WEIGHT: 293 LBS | HEIGHT: 68 IN | HEART RATE: 68 BPM | DIASTOLIC BLOOD PRESSURE: 102 MMHG | SYSTOLIC BLOOD PRESSURE: 175 MMHG | BODY MASS INDEX: 44.41 KG/M2

## 2025-01-15 DIAGNOSIS — L97.212: Primary | ICD-10-CM

## 2025-01-15 DIAGNOSIS — Z91.199 NON COMPLIANCE WITH MEDICAL TREATMENT: ICD-10-CM

## 2025-01-15 DIAGNOSIS — S81.801D OPEN WOUND OF RIGHT LOWER EXTREMITY WITH COMPLICATION, SUBSEQUENT ENCOUNTER: ICD-10-CM

## 2025-01-15 DIAGNOSIS — I89.0 LYMPHEDEMA: ICD-10-CM

## 2025-01-15 DIAGNOSIS — R23.4 CHANGES IN SKIN TEXTURE: ICD-10-CM

## 2025-01-15 DIAGNOSIS — L03.115 CELLULITIS OF LEG, RIGHT: ICD-10-CM

## 2025-01-15 DIAGNOSIS — E66.01 MORBID OBESITY (MULTI): ICD-10-CM

## 2025-01-15 DIAGNOSIS — I83.012: Primary | ICD-10-CM

## 2025-01-15 PROCEDURE — 99214 OFFICE O/P EST MOD 30 MIN: CPT | Performed by: NURSE PRACTITIONER

## 2025-01-15 PROCEDURE — 1036F TOBACCO NON-USER: CPT | Performed by: NURSE PRACTITIONER

## 2025-01-15 PROCEDURE — 3008F BODY MASS INDEX DOCD: CPT | Performed by: NURSE PRACTITIONER

## 2025-01-15 PROCEDURE — 3080F DIAST BP >= 90 MM HG: CPT | Performed by: NURSE PRACTITIONER

## 2025-01-15 PROCEDURE — 3077F SYST BP >= 140 MM HG: CPT | Performed by: NURSE PRACTITIONER

## 2025-01-15 RX ORDER — LEVOFLOXACIN 750 MG/1
750 TABLET ORAL EVERY 24 HOURS
Qty: 14 TABLET | Refills: 0 | Status: SHIPPED | OUTPATIENT
Start: 2025-01-15 | End: 2025-01-29

## 2025-01-15 ASSESSMENT — ENCOUNTER SYMPTOMS
DEPRESSION: 0
LOSS OF SENSATION IN FEET: 0
OCCASIONAL FEELINGS OF UNSTEADINESS: 1

## 2025-01-15 ASSESSMENT — PAIN SCALES - GENERAL: PAINLEVEL_OUTOF10: 8

## 2025-01-15 NOTE — PATIENT INSTRUCTIONS
Mrs. Hurtado,    It was really nice to meet you!  Thank you for choosing  vascular surgery for your care.    We discussed a number of different issues during your visit.  We reviewed the results of your venous insufficiency study which showed that you have deep venous reflux.  However, there was no superficial venous reflux that was documented on the study.  I will discuss your case with Dr. Bacon and get back to you.  You do not hear from me within the next 2 to 3 days, please call me at [phone] option 2.    Also, I am advising that you go to the emergency room for ascending cellulitis.  Your increased pain is also a sign of worsening infection.

## 2025-01-15 NOTE — TELEPHONE ENCOUNTER
Patient called frustrated, asking who referred her to her Elia Maldonado NP today.      She thought she was referred to Elia Maldonado NP to discuss surgery but states the appointment was about wound care, and she did not want wound care (?).  I believe she is trying to communicate that she very much wants surgery only.     I informed her that according to Elia's note, he will speak with Dr. Bacon and then contact her in the next few days.      Patient wanted to speak to someone today.  She asked for Dr. Bacon's vein center office number.  Office number provided.     Conchita Barraza, APRN-CNP  Vascular Surgery

## 2025-01-15 NOTE — PROGRESS NOTES
"History Of Present Illness  Saba Hurtado \"Mrs. Saba Hurtado\" is a 59 y.o. female presenting with lower extremity venous stasis ulceration that has been present for about a year.  The patient has seen 2 different wound care centers including  [facility] and [facility].  The patient felt as if aspects of her care at both places was inappropriate and is currently self treating her wound.  She states she is unable to tolerate Unna boots (both Zinc Oxide and Calamine) due to several allergies that she has causes her to break out in a rash. She does compress her legs but not as well recently due to increased pain. Over the last week, she has had increasing redness, drainage, pain in the right lower extremity.  She saw her primary care provider yesterday and was prescribed Levaquin.  She has not yet picked up the Levaquin because she is dependent upon public transportation.  Has been treated by Dr. Tanner (ID) in the past as well.  She was initially seen vascular surgery about 1 year ago.  At that time, she was referred to Dr. Bacon and a venous insufficiency study was ordered.  The patient did not get the study done as she was told that she could not have it done with an open wound of her leg.  Eventually, she found a place where she could have the study completed.  Venous insufficiency study completed 12/4/2024.  She was apparently told she needed venous surgery.  No prior history of DVT or superficial phlebitis.  Denies any claudication or ischemic rest pain.     Past Medical History  She has a past medical history of Angina at rest (CMS-HCC), Obstructive sleep apnea (adult) (pediatric), Other specified diabetes mellitus without complications, Other specified diabetes mellitus without complications, Personal history of other diseases of the circulatory system, Personal history of other diseases of the circulatory system, Personal history of other diseases of the circulatory system, Personal history of other " diseases of the nervous system and sense organs, Personal history of other diseases of the respiratory system, Personal history of other specified conditions, and Unspecified asthma with (acute) exacerbation ([facility]).    Surgical History  She has a past surgical history that includes Other surgical history (12/28/2021); Other surgical history (12/28/2021); Other surgical history (12/28/2021); Other surgical history (12/28/2021); Other surgical history (12/28/2021); Other surgical history (12/28/2021); Other surgical history (12/28/2021); Other surgical history (12/28/2021); and Other surgical history (12/28/2021).     Social History  She reports that she has never smoked. She has never used smokeless tobacco. She reports that she does not drink alcohol and does not use drugs.    Family History  Family History   Problem Relation Name Age of Onset    Migraines Daughter          Allergies  Clarithromycin, Diazepam, Erythromycin, Lorazepam, Meperidine, Morphine, Norvasc [amlodipine], Oxycodone, Pertussis vaccines, Prednisone, Tramadol, Amoxicillin, Aspirin, Dicyclomine, Diphenoxylate-atropine, Doxycycline, Hydrocodone, Hydrocodone-acetaminophen, Propoxyphene-acetaminophen, and Pse-hydrocodone-pot guaiaco    Review of Systems     CONSTITUTIONAL: Denies weight loss, fever and chills.  Positive for morbid obesity    HEENT: Denies changes in vision and hearing.    RESPIRATORY: Denies SOB and cough.    CV: Denies palpitations and CP.    GI: Denies abdominal pain, nausea, vomiting and diarrhea.    : Denies dysuria and urinary frequency.    MSK: Denies myalgia and joint pain.    SKIN: Denies rash and pruritus.    VASC: Denies claudication, ischemic rest pain.  Positive for longstanding right lower extremity venous stasis ulceration.  Positive for leg edema    NEUROLOGICAL: Denies headache and syncope.    PSYCHIATRIC: Denies recent changes in mood. Denies anxiety and depression.    Physical Exam    General: Pt is alert and  "oriented x 3. Conversive.  Agitated at times during exam  HEENT: Head is atraumatic, normocephalic.   Pulse exam: Palpable brachial and radial pulses bilaterally.  To palpate pedal pulses secondary to dorsal foot edema.  Palpable popliteal pulses bilaterally.  Extremities: 2-3+ pitting edema bilateral lower extremities hemosiderin deposition bilaterally.  Nearly circumferential ulceration of the right lower leg.  Wound itself is quite shallow.  There is devitalized tissue present in the wound bed.  There is surrounding tissue erythema with ascending erythema close to the level of the knee.  Please see pictures.  No significant varices noted.  Positive for dorsal foot hump.  Dusky discoloration of the right foot due to impaired venous return/lymphedema  Neuro: Moves all extremities spontaneously.  No focal deficits.  Psych: Appropriate affect.  Answers questions appropriately.    CEAP 6                             Last Recorded Vitals  BP (!) 175/102 (BP Location: Left arm, Patient Position: Sitting)   Pulse 68   Wt 136 kg (300 lb)     Relevant Results  Current Outpatient Medications   Medication Instructions    albuterol 90 mcg/actuation inhaler 2 puffs, inhalation, Every 4 hours PRN    [START ON 1/18/2025] ALPRAZolam (XANAX) 0.5-1 mg, oral, 2 times daily    blood pressure test kit-wrist kit OMRON 7 Series wrist cuff - tests BP as needed    blood sugar diagnostic (OneTouch Ultra Test) strip 1 strip, in vitro, 2 times daily    elastic bandage (Coban) 4 X 5 \"-yard bandage 1 Units, topical (top), Daily    Flonase Allergy Relief 50 mcg/actuation nasal spray 2 sprays, Each Nostril, Daily    furosemide (Lasix) 20 mg tablet TAKE 1 TABLET(20 MG) BY MOUTH DAILY AS NEEDED FOR SWELLING    hydrocortisone 1 % cream 1 APPLICATION EXTERNALLY TWICE A DAY 30 DAYS    ibuprofen 800 mg, oral, 3 times daily    Kerlix 1 Units, topical (top), Daily    ketoconazole (NIZOral) 2 % cream Topical, 2 times daily    ketoconazole (NIZOral) 2 % " "shampoo 1 Application, Topical, 2 times daily    lancets (ONETOUCH DELICA PLUS LANCET) 1 Units, in vitro, 2 times daily    lancets 30 gauge misc Daily    levoFLOXacin (LEVAQUIN) 750 mg, oral, Every 24 hours    levothyroxine (Synthroid, Levoxyl) 50 mcg tablet TAKE 1 TABLET BY MOUTH EVERY DAY IN THE MORNING BEFORE FOOD X 90 DAYS    mupirocin (Bactroban) 2 % ointment APPLY TO AFFECTED AREA TWICE DAILY UNTIL HEALED    non-adher band-sunflowr-petrol (Oil Emulsion Dressing) 3 X 8 \" bandage 1 Units, topical (top), Daily    omeprazole (PriLOSEC) 20 mg DR capsule TAKE 1 CAPSULE BY MOUTH 30 MINUTES BEFORE MORNING MEAL ONCE A DAY FOR 90 DAY(S)    OneTouch Ultra Control solution 1 Units, in vitro, As needed, AS DIRECTED IN VITRO DAILY AS NEEDEDAS DIRECTED IN VITRO DAILY AS NEEDED    OneTouch Ultra2 Meter misc 1 Units, in vitro, Daily    ProAir HFA 90 mcg/actuation inhaler 2 puffs, inhalation, Every 6 hours PRN, USE AS DIRECTED    sodium chloride (Saline NasaL) 0.65 % nasal spray USE 2 SPRAYS IN EACH NOSTRIL AS NEEDED DAILY    Vivelle-Dot 0.1 mg/24 hr patch 1 patch, transdermal, 2 times weekly         Assessment/Plan   Venous stasis ulceration right lower extremity  Leg edema  Lymphedema bilaterally  Right leg cellulitis  Changes in skin texture   Noncompliance with medical treatment  Morbid obesity    This patient has a year-long venous stasis ulceration to the right lower extremity with a relatively new associated cellulitis.  Has had increasing pain, redness, and drainage.  On my exam, I noted that she had some ascending erythema in the lower leg close to the level of the knee.  I strongly advised the patient to go to the emergency room for further evaluation and IV antibiotics.  She was prescribed Levaquin yesterday by her primary care provider but has not yet picked up the antibiotic.      Additionally, the patient has several comorbid conditions that are preventing her ulceration from healing.  Primarily, lymphedema and " morbid obesity.  Based on my physical exam, I do not believe that she compresses her leg adequately because she still has significant edema bilaterally.  She apparently is unable to tolerate Unna boots.  I recommended that she go to the [facility] [place] wound care center for further care of her wound, however, she is reluctant due to her past experiences.  Currently, she is treating her wound on her own.    She was under the impression that she needed vein surgery.  I did personally review her venous insufficiency study.  There was deep venous reflux noted but no greater saphenous vein reflux.  There are no modalities to treat deep venous reflux. I will plan to discuss this case with Dr. Bacon at our monthly venous case conference.  As far as I can tell, she does not need any surgical procedures.  She needs to continue compression, leg elevation is much as possible.    Based upon our lengthy discussion, the patient is somewhat noncompliant with regards to medical treatments recommended.  She has been to 2 separate wound care centers and subsequently left due to disagreeing with their recommendations.         Bacilio Maldonado, APRN-CNP

## 2025-01-16 ENCOUNTER — TELEPHONE (OUTPATIENT)
Dept: VASCULAR SURGERY | Facility: CLINIC | Age: 60
End: 2025-01-16
Payer: COMMERCIAL

## 2025-01-16 NOTE — TELEPHONE ENCOUNTER
Pt called regarding plan for vascular surgery:    Nurse notified patient that a conference was held with Dr. Bacon and Bacilio Maldonado, plan for CT abdomen pelvis with contrast. Pt notified nurse she has a severe allergy to contrast dye. Nurse notified Michael.    There is a previous note stating that the patient only wanted surgery. Patient wanted to clarify that she is already doing wound care with her PCP and is happy with that treatment. She would just like to speak to vascular surgery about any possible treatments that may help her from their standpoint. She was initially referred to Dr. Bacon by Dr. Barney, however due to transportation issues and Dr. Bacon's locations, she was scheduled with Elia Maldonado to do that initial consult, know that that Michael conference about patients regularly.     Pt Stated that in the past she was told after a mammogram that she had enlarged lymph nodes, but this was never addressed.    Awaiting Dr. Bacon confirmation that a non-contrast CT is appropriate. If so, pt will schedule at [place], phone number given to patient, and then pt will notify nurse of scheduled date, nurse will schedule virtual visit with Dr. Bacon to discuss.

## 2025-01-19 ENCOUNTER — TELEPHONE (OUTPATIENT)
Dept: PRIMARY CARE | Facility: CLINIC | Age: 60
End: 2025-01-19
Payer: COMMERCIAL

## 2025-01-19 LAB
BACTERIA SPEC CULT: ABNORMAL
GRAM STN SPEC: ABNORMAL
GRAM STN SPEC: ABNORMAL

## 2025-01-19 NOTE — TELEPHONE ENCOUNTER
Culture shows bacteria resistant to current antibiotic. Recommend she consult with infectious disease. FWD result to Dr. BURNETT's office.

## 2025-01-20 ENCOUNTER — PATIENT MESSAGE (OUTPATIENT)
Dept: PRIMARY CARE | Facility: CLINIC | Age: 60
End: 2025-01-20
Payer: COMMERCIAL

## 2025-01-20 DIAGNOSIS — J31.0 CHRONIC RHINITIS: Primary | ICD-10-CM

## 2025-01-21 ENCOUNTER — TELEPHONE (OUTPATIENT)
Dept: VASCULAR SURGERY | Facility: HOSPITAL | Age: 60
End: 2025-01-21
Payer: COMMERCIAL

## 2025-01-21 ENCOUNTER — TELEPHONE (OUTPATIENT)
Dept: PRIMARY CARE | Facility: CLINIC | Age: 60
End: 2025-01-21
Payer: COMMERCIAL

## 2025-01-21 DIAGNOSIS — S81.801D OPEN WOUND OF RIGHT LOWER EXTREMITY WITH COMPLICATION, SUBSEQUENT ENCOUNTER: Primary | ICD-10-CM

## 2025-01-21 DIAGNOSIS — L03.115 CELLULITIS OF RIGHT LOWER LIMB: Primary | ICD-10-CM

## 2025-01-21 NOTE — TELEPHONE ENCOUNTER
Spoke with patient who verbally understands. Patient states she will try to get transportation to the hospital.

## 2025-01-21 NOTE — TELEPHONE ENCOUNTER
Recommend she been seen at the hospital for further treatment as none of the antibiotics sensitive are oral and she is experiencing worsening wound/cellulitis.

## 2025-01-21 NOTE — PROGRESS NOTES
I called the patient by phone after discussing her imaging with Dr. Bacon during our monthly venous case conference.  In review of the venous insufficiency study, there was no greater saphenous vein reflux.  However Dr. Bacon would like the patient to undergo a CT abdomen and pelvis with IV contrast.  However, the patient has an allergy to IV contrast and so we compromised on a CT of abdomen and pelvis with no IV contrast.      Ultimately, she is going to need to find a way to compress her leg more adequately and control her severe lymphedema, otherwise the ulceration will not heal.  Additionally, she brought to my attention that her wound culture done by PCP showed Pseudomonas and MSSA.  She is being referred to infectious disease for treatment of this issue.  I reiterated my recommendation to go to the emergency room and admission to the hospiral for IV antibiotics as she did appear to have ascending cellulitis. She understands the risks of worsening infection and possibly sepsis that may come from delayed treatment.    She will follow-up with Dr. Bacon after the CT scan.

## 2025-01-22 DIAGNOSIS — R05.9 COUGH, UNSPECIFIED TYPE: ICD-10-CM

## 2025-01-22 RX ORDER — ALBUTEROL SULFATE 90 UG/1
2 INHALANT RESPIRATORY (INHALATION) EVERY 4 HOURS PRN
Qty: 8.5 G | Refills: 1 | Status: SHIPPED | OUTPATIENT
Start: 2025-01-22

## 2025-01-27 ENCOUNTER — APPOINTMENT (OUTPATIENT)
Dept: VASCULAR SURGERY | Facility: HOSPITAL | Age: 60
End: 2025-01-27
Payer: COMMERCIAL

## 2025-01-27 ENCOUNTER — HOSPITAL ENCOUNTER (INPATIENT)
Facility: HOSPITAL | Age: 60
LOS: 5 days | Discharge: HOME HEALTH | End: 2025-02-01
Admitting: INTERNAL MEDICINE
Payer: COMMERCIAL

## 2025-01-27 DIAGNOSIS — L03.115 CELLULITIS OF LEG, RIGHT: Primary | ICD-10-CM

## 2025-01-27 DIAGNOSIS — S81.801D OPEN WOUND OF RIGHT LOWER EXTREMITY WITH COMPLICATION, SUBSEQUENT ENCOUNTER: ICD-10-CM

## 2025-01-27 DIAGNOSIS — L03.115 CELLULITIS OF RIGHT LOWER EXTREMITY: ICD-10-CM

## 2025-01-27 DIAGNOSIS — Z78.9 FAILURE OF OUTPATIENT TREATMENT: ICD-10-CM

## 2025-01-27 DIAGNOSIS — A49.8 PSEUDOMONAS AERUGINOSA INFECTION: ICD-10-CM

## 2025-01-27 DIAGNOSIS — I10 HYPERTENSION, UNSPECIFIED TYPE: ICD-10-CM

## 2025-01-27 DIAGNOSIS — M13.0 POLYARTHRITIS: ICD-10-CM

## 2025-01-27 LAB
ALBUMIN SERPL BCP-MCNC: 4.1 G/DL (ref 3.4–5)
ALP SERPL-CCNC: 78 U/L (ref 33–110)
ALT SERPL W P-5'-P-CCNC: 12 U/L (ref 7–45)
ANION GAP SERPL CALCULATED.3IONS-SCNC: 11 MMOL/L (ref 10–20)
APPEARANCE UR: CLEAR
AST SERPL W P-5'-P-CCNC: 12 U/L (ref 9–39)
BASOPHILS # BLD AUTO: 0.06 X10*3/UL (ref 0–0.1)
BASOPHILS NFR BLD AUTO: 0.6 %
BILIRUB SERPL-MCNC: 0.5 MG/DL (ref 0–1.2)
BILIRUB UR STRIP.AUTO-MCNC: NEGATIVE MG/DL
BUN SERPL-MCNC: 21 MG/DL (ref 6–23)
CALCIUM SERPL-MCNC: 9.3 MG/DL (ref 8.6–10.3)
CHLORIDE SERPL-SCNC: 104 MMOL/L (ref 98–107)
CO2 SERPL-SCNC: 26 MMOL/L (ref 21–32)
COLOR UR: NORMAL
CREAT SERPL-MCNC: 0.72 MG/DL (ref 0.5–1.05)
CRP SERPL-MCNC: 0.74 MG/DL
EGFRCR SERPLBLD CKD-EPI 2021: >90 ML/MIN/1.73M*2
EOSINOPHIL # BLD AUTO: 0.36 X10*3/UL (ref 0–0.7)
EOSINOPHIL NFR BLD AUTO: 3.4 %
ERYTHROCYTE [DISTWIDTH] IN BLOOD BY AUTOMATED COUNT: 13 % (ref 11.5–14.5)
ERYTHROCYTE [SEDIMENTATION RATE] IN BLOOD BY WESTERGREN METHOD: 21 MM/H (ref 0–30)
GLUCOSE SERPL-MCNC: 114 MG/DL (ref 74–99)
GLUCOSE UR STRIP.AUTO-MCNC: NORMAL MG/DL
HCT VFR BLD AUTO: 42.1 % (ref 36–46)
HGB BLD-MCNC: 13.6 G/DL (ref 12–16)
IMM GRANULOCYTES # BLD AUTO: 0.09 X10*3/UL (ref 0–0.7)
IMM GRANULOCYTES NFR BLD AUTO: 0.8 % (ref 0–0.9)
KETONES UR STRIP.AUTO-MCNC: NEGATIVE MG/DL
LACTATE SERPL-SCNC: 1.4 MMOL/L (ref 0.4–2)
LEUKOCYTE ESTERASE UR QL STRIP.AUTO: NEGATIVE
LYMPHOCYTES # BLD AUTO: 2.97 X10*3/UL (ref 1.2–4.8)
LYMPHOCYTES NFR BLD AUTO: 27.8 %
MCH RBC QN AUTO: 28.3 PG (ref 26–34)
MCHC RBC AUTO-ENTMCNC: 32.3 G/DL (ref 32–36)
MCV RBC AUTO: 88 FL (ref 80–100)
MONOCYTES # BLD AUTO: 0.8 X10*3/UL (ref 0.1–1)
MONOCYTES NFR BLD AUTO: 7.5 %
NEUTROPHILS # BLD AUTO: 6.39 X10*3/UL (ref 1.2–7.7)
NEUTROPHILS NFR BLD AUTO: 59.9 %
NITRITE UR QL STRIP.AUTO: NEGATIVE
NRBC BLD-RTO: 0 /100 WBCS (ref 0–0)
PH UR STRIP.AUTO: 6.5 [PH]
PLATELET # BLD AUTO: 286 X10*3/UL (ref 150–450)
POTASSIUM SERPL-SCNC: 4.2 MMOL/L (ref 3.5–5.3)
PROT SERPL-MCNC: 7.3 G/DL (ref 6.4–8.2)
PROT UR STRIP.AUTO-MCNC: NEGATIVE MG/DL
RBC # BLD AUTO: 4.81 X10*6/UL (ref 4–5.2)
RBC # UR STRIP.AUTO: NEGATIVE /UL
SODIUM SERPL-SCNC: 137 MMOL/L (ref 136–145)
SP GR UR STRIP.AUTO: 1.02
UROBILINOGEN UR STRIP.AUTO-MCNC: NORMAL MG/DL
WBC # BLD AUTO: 10.7 X10*3/UL (ref 4.4–11.3)

## 2025-01-27 PROCEDURE — 96365 THER/PROPH/DIAG IV INF INIT: CPT

## 2025-01-27 PROCEDURE — 84075 ASSAY ALKALINE PHOSPHATASE: CPT | Performed by: PHYSICIAN ASSISTANT

## 2025-01-27 PROCEDURE — 86140 C-REACTIVE PROTEIN: CPT | Performed by: PHYSICIAN ASSISTANT

## 2025-01-27 PROCEDURE — 85025 COMPLETE CBC W/AUTO DIFF WBC: CPT | Performed by: PHYSICIAN ASSISTANT

## 2025-01-27 PROCEDURE — 2500000001 HC RX 250 WO HCPCS SELF ADMINISTERED DRUGS (ALT 637 FOR MEDICARE OP)

## 2025-01-27 PROCEDURE — 96375 TX/PRO/DX INJ NEW DRUG ADDON: CPT

## 2025-01-27 PROCEDURE — 83605 ASSAY OF LACTIC ACID: CPT | Performed by: PHYSICIAN ASSISTANT

## 2025-01-27 PROCEDURE — 36415 COLL VENOUS BLD VENIPUNCTURE: CPT | Performed by: PHYSICIAN ASSISTANT

## 2025-01-27 PROCEDURE — 81003 URINALYSIS AUTO W/O SCOPE: CPT | Performed by: PHYSICIAN ASSISTANT

## 2025-01-27 PROCEDURE — 85652 RBC SED RATE AUTOMATED: CPT | Performed by: PHYSICIAN ASSISTANT

## 2025-01-27 PROCEDURE — 87205 SMEAR GRAM STAIN: CPT | Mod: TRILAB | Performed by: PHYSICIAN ASSISTANT

## 2025-01-27 PROCEDURE — 2500000004 HC RX 250 GENERAL PHARMACY W/ HCPCS (ALT 636 FOR OP/ED): Mod: JZ | Performed by: PHYSICIAN ASSISTANT

## 2025-01-27 PROCEDURE — 99223 1ST HOSP IP/OBS HIGH 75: CPT | Performed by: INTERNAL MEDICINE

## 2025-01-27 PROCEDURE — 99285 EMERGENCY DEPT VISIT HI MDM: CPT

## 2025-01-27 PROCEDURE — 2500000004 HC RX 250 GENERAL PHARMACY W/ HCPCS (ALT 636 FOR OP/ED): Mod: JZ | Performed by: INTERNAL MEDICINE

## 2025-01-27 PROCEDURE — 1100000001 HC PRIVATE ROOM DAILY

## 2025-01-27 PROCEDURE — 87040 BLOOD CULTURE FOR BACTERIA: CPT | Mod: TRILAB | Performed by: PHYSICIAN ASSISTANT

## 2025-01-27 PROCEDURE — 96366 THER/PROPH/DIAG IV INF ADDON: CPT

## 2025-01-27 RX ORDER — ESTRADIOL 0.1 MG/D
1 FILM, EXTENDED RELEASE TRANSDERMAL 2 TIMES WEEKLY
Status: DISCONTINUED | OUTPATIENT
Start: 2025-01-30 | End: 2025-02-01 | Stop reason: HOSPADM

## 2025-01-27 RX ORDER — ACETAMINOPHEN 160 MG/5ML
650 SOLUTION ORAL EVERY 4 HOURS PRN
Status: DISCONTINUED | OUTPATIENT
Start: 2025-01-27 | End: 2025-02-01 | Stop reason: HOSPADM

## 2025-01-27 RX ORDER — IBUPROFEN 800 MG/1
800 TABLET ORAL 3 TIMES DAILY
Status: DISCONTINUED | OUTPATIENT
Start: 2025-01-27 | End: 2025-02-01 | Stop reason: HOSPADM

## 2025-01-27 RX ORDER — ENOXAPARIN SODIUM 100 MG/ML
40 INJECTION SUBCUTANEOUS EVERY 12 HOURS SCHEDULED
Status: DISCONTINUED | OUTPATIENT
Start: 2025-01-27 | End: 2025-02-01 | Stop reason: HOSPADM

## 2025-01-27 RX ORDER — ONDANSETRON HYDROCHLORIDE 2 MG/ML
4 INJECTION, SOLUTION INTRAVENOUS EVERY 8 HOURS PRN
Status: DISCONTINUED | OUTPATIENT
Start: 2025-01-27 | End: 2025-02-01 | Stop reason: HOSPADM

## 2025-01-27 RX ORDER — MEROPENEM AND SODIUM CHLORIDE 1 G/50ML
1 INJECTION, SOLUTION INTRAVENOUS ONCE
Status: COMPLETED | OUTPATIENT
Start: 2025-01-27 | End: 2025-01-27

## 2025-01-27 RX ORDER — ONDANSETRON 4 MG/1
4 TABLET, ORALLY DISINTEGRATING ORAL EVERY 8 HOURS PRN
Status: DISCONTINUED | OUTPATIENT
Start: 2025-01-27 | End: 2025-02-01 | Stop reason: HOSPADM

## 2025-01-27 RX ORDER — ACETAMINOPHEN 650 MG/1
650 SUPPOSITORY RECTAL EVERY 4 HOURS PRN
Status: DISCONTINUED | OUTPATIENT
Start: 2025-01-27 | End: 2025-02-01 | Stop reason: HOSPADM

## 2025-01-27 RX ORDER — CEFEPIME HYDROCHLORIDE 1 G/50ML
1 INJECTION, SOLUTION INTRAVENOUS EVERY 8 HOURS
Status: DISCONTINUED | OUTPATIENT
Start: 2025-01-27 | End: 2025-01-28

## 2025-01-27 RX ORDER — FLUTICASONE PROPIONATE 50 MCG
2 SPRAY, SUSPENSION (ML) NASAL DAILY
Status: DISCONTINUED | OUTPATIENT
Start: 2025-01-28 | End: 2025-01-28

## 2025-01-27 RX ORDER — VANCOMYCIN 2 G/400ML
2 INJECTION, SOLUTION INTRAVENOUS ONCE
Status: COMPLETED | OUTPATIENT
Start: 2025-01-27 | End: 2025-01-27

## 2025-01-27 RX ORDER — ALBUTEROL SULFATE 0.83 MG/ML
2.5 SOLUTION RESPIRATORY (INHALATION) EVERY 4 HOURS PRN
Status: DISCONTINUED | OUTPATIENT
Start: 2025-01-27 | End: 2025-02-01 | Stop reason: HOSPADM

## 2025-01-27 RX ORDER — ALPRAZOLAM 0.25 MG/1
0.25 TABLET ORAL 2 TIMES DAILY PRN
Status: DISCONTINUED | OUTPATIENT
Start: 2025-01-27 | End: 2025-01-28

## 2025-01-27 RX ORDER — VANCOMYCIN 1.25 G/250ML
1750 INJECTION, SOLUTION INTRAVENOUS EVERY 12 HOURS
Status: DISCONTINUED | OUTPATIENT
Start: 2025-01-28 | End: 2025-02-01 | Stop reason: HOSPADM

## 2025-01-27 RX ORDER — POLYETHYLENE GLYCOL 3350 17 G/17G
17 POWDER, FOR SOLUTION ORAL DAILY
Status: DISCONTINUED | OUTPATIENT
Start: 2025-01-28 | End: 2025-02-01 | Stop reason: HOSPADM

## 2025-01-27 RX ORDER — ACETAMINOPHEN 325 MG/1
650 TABLET ORAL EVERY 4 HOURS PRN
Status: DISCONTINUED | OUTPATIENT
Start: 2025-01-27 | End: 2025-02-01 | Stop reason: HOSPADM

## 2025-01-27 RX ORDER — VANCOMYCIN HYDROCHLORIDE 1 G/20ML
INJECTION, POWDER, LYOPHILIZED, FOR SOLUTION INTRAVENOUS DAILY PRN
Status: DISCONTINUED | OUTPATIENT
Start: 2025-01-27 | End: 2025-02-01 | Stop reason: HOSPADM

## 2025-01-27 RX ADMIN — VANCOMYCIN 2 G: 2 INJECTION, SOLUTION INTRAVENOUS at 15:00

## 2025-01-27 RX ADMIN — SODIUM CHLORIDE 1000 ML: 900 INJECTION, SOLUTION INTRAVENOUS at 13:52

## 2025-01-27 RX ADMIN — MEROPENEM AND SODIUM CHLORIDE 1 G: 1 INJECTION, SOLUTION INTRAVENOUS at 14:20

## 2025-01-27 RX ADMIN — CEFEPIME HYDROCHLORIDE 1 G: 1 INJECTION, SOLUTION INTRAVENOUS at 21:46

## 2025-01-27 RX ADMIN — IBUPROFEN 800 MG: 800 TABLET, FILM COATED ORAL at 22:26

## 2025-01-27 ASSESSMENT — PAIN - FUNCTIONAL ASSESSMENT: PAIN_FUNCTIONAL_ASSESSMENT: 0-10

## 2025-01-27 ASSESSMENT — COLUMBIA-SUICIDE SEVERITY RATING SCALE - C-SSRS
1. IN THE PAST MONTH, HAVE YOU WISHED YOU WERE DEAD OR WISHED YOU COULD GO TO SLEEP AND NOT WAKE UP?: NO
6. HAVE YOU EVER DONE ANYTHING, STARTED TO DO ANYTHING, OR PREPARED TO DO ANYTHING TO END YOUR LIFE?: NO
2. HAVE YOU ACTUALLY HAD ANY THOUGHTS OF KILLING YOURSELF?: NO

## 2025-01-27 ASSESSMENT — PAIN DESCRIPTION - FREQUENCY: FREQUENCY: CONSTANT/CONTINUOUS

## 2025-01-27 ASSESSMENT — PAIN DESCRIPTION - ORIENTATION: ORIENTATION: RIGHT

## 2025-01-27 ASSESSMENT — PAIN DESCRIPTION - LOCATION: LOCATION: LEG

## 2025-01-27 ASSESSMENT — PAIN SCALES - GENERAL: PAINLEVEL_OUTOF10: 6

## 2025-01-27 ASSESSMENT — PAIN DESCRIPTION - DESCRIPTORS: DESCRIPTORS: SHARP

## 2025-01-27 NOTE — H&P
History Of Present Illness  Mrs. Saba Hurtado is a 59 y.o. female presenting with lower extremity ulcer with Pseudomonas.  Patient is a very pleasant 59-year-old female states she has had a right lower extremity ulcer for quite a while now which self heals and states it comes back, recently started seeing infectious disease does do wound care at home.  Stated she was called about 3 to 4 days ago that what her culture was positive for Pseudomonas while she was on Levaquin and to come into the ED.  Had difficult time with transportation therefore she came into the ED today no fevers no chills.  Has been dressing it appropriately with wound care     Past Medical History  She has a past medical history of Angina at rest (CMS-HCC), Obstructive sleep apnea (adult) (pediatric), Other specified diabetes mellitus without complications, Other specified diabetes mellitus without complications, Personal history of other diseases of the circulatory system, Personal history of other diseases of the circulatory system, Personal history of other diseases of the circulatory system, Personal history of other diseases of the nervous system and sense organs, Personal history of other diseases of the respiratory system, Personal history of other specified conditions, and Unspecified asthma with (acute) exacerbation ([facility]).    Surgical History  She has a past surgical history that includes Other surgical history (12/28/2021); Other surgical history (12/28/2021); Other surgical history (12/28/2021); Other surgical history (12/28/2021); Other surgical history (12/28/2021); Other surgical history (12/28/2021); Other surgical history (12/28/2021); Other surgical history (12/28/2021); and Other surgical history (12/28/2021).     Social History  She reports that she has never smoked. She has never used smokeless tobacco. She reports that she does not drink alcohol and does not use drugs.    Family History  Family History    Problem Relation Name Age of Onset    Migraines Daughter          Allergies  Clarithromycin, Diazepam, Erythromycin, Iodinated contrast media, Lorazepam, Meperidine, Morphine, Norvasc [amlodipine], Oxycodone, Pertussis vaccines, Prednisone, Tramadol, Unna-flex elastic unna boot [zinc oxide], Clindamycin, Adhesive tape-silicones, Amoxicillin, Aspirin, Dicyclomine, Diphenoxylate-atropine, Doxycycline, Hydrocodone, Hydrocodone-acetaminophen, Propoxyphene-acetaminophen, and Pse-hydrocodone-pot guaiaco    Review of Systems   All other systems reviewed and are negative.       Physical Exam  Vitals reviewed.   Constitutional:       Appearance: Normal appearance.   HENT:      Right Ear: Tympanic membrane normal.      Nose: Nose normal.      Mouth/Throat:      Mouth: Mucous membranes are moist.   Cardiovascular:      Rate and Rhythm: Normal rate and regular rhythm.   Pulmonary:      Effort: Pulmonary effort is normal.   Abdominal:      General: Abdomen is flat. Bowel sounds are normal.      Palpations: Abdomen is soft.   Skin:     Capillary Refill: Capillary refill takes less than 2 seconds.   Neurological:      General: No focal deficit present.      Mental Status: She is alert.          Last Recorded Vitals  BP (!) 226/121   Pulse 86   Temp 37 °C (98.6 °F) (Temporal)   Resp 18   Wt 139 kg (306 lb 3.5 oz)   SpO2 96%     Relevant Results  Results for orders placed or performed during the hospital encounter of 01/27/25 (from the past 24 hours)   CBC and Auto Differential   Result Value Ref Range    WBC 10.7 4.4 - 11.3 x10*3/uL    nRBC 0.0 0.0 - 0.0 /100 WBCs    RBC 4.81 4.00 - 5.20 x10*6/uL    Hemoglobin 13.6 12.0 - 16.0 g/dL    Hematocrit 42.1 36.0 - 46.0 %    MCV 88 80 - 100 fL    MCH 28.3 26.0 - 34.0 pg    MCHC 32.3 32.0 - 36.0 g/dL    RDW 13.0 11.5 - 14.5 %    Platelets 286 150 - 450 x10*3/uL    Neutrophils % 59.9 40.0 - 80.0 %    Immature Granulocytes %, Automated 0.8 0.0 - 0.9 %    Lymphocytes % 27.8 13.0 - 44.0  %    Monocytes % 7.5 2.0 - 10.0 %    Eosinophils % 3.4 0.0 - 6.0 %    Basophils % 0.6 0.0 - 2.0 %    Neutrophils Absolute 6.39 1.20 - 7.70 x10*3/uL    Immature Granulocytes Absolute, Automated 0.09 0.00 - 0.70 x10*3/uL    Lymphocytes Absolute 2.97 1.20 - 4.80 x10*3/uL    Monocytes Absolute 0.80 0.10 - 1.00 x10*3/uL    Eosinophils Absolute 0.36 0.00 - 0.70 x10*3/uL    Basophils Absolute 0.06 0.00 - 0.10 x10*3/uL   Comprehensive Metabolic Panel   Result Value Ref Range    Glucose 114 (H) 74 - 99 mg/dL    Sodium 137 136 - 145 mmol/L    Potassium 4.2 3.5 - 5.3 mmol/L    Chloride 104 98 - 107 mmol/L    Bicarbonate 26 21 - 32 mmol/L    Anion Gap 11 10 - 20 mmol/L    Urea Nitrogen 21 6 - 23 mg/dL    Creatinine 0.72 0.50 - 1.05 mg/dL    eGFR >90 >60 mL/min/1.73m*2    Calcium 9.3 8.6 - 10.3 mg/dL    Albumin 4.1 3.4 - 5.0 g/dL    Alkaline Phosphatase 78 33 - 110 U/L    Total Protein 7.3 6.4 - 8.2 g/dL    AST 12 9 - 39 U/L    Bilirubin, Total 0.5 0.0 - 1.2 mg/dL    ALT 12 7 - 45 U/L   Lactate   Result Value Ref Range    Lactate 1.4 0.4 - 2.0 mmol/L   Sedimentation rate, automated   Result Value Ref Range    Sedimentation Rate 21 0 - 30 mm/h   C-reactive protein   Result Value Ref Range    C-Reactive Protein 0.74 <1.00 mg/dL   Urinalysis with Reflex Culture and Microscopic   Result Value Ref Range    Color, Urine Light-Yellow Light-Yellow, Yellow, Dark-Yellow    Appearance, Urine Clear Clear    Specific Gravity, Urine 1.023 1.005 - 1.035    pH, Urine 6.5 5.0, 5.5, 6.0, 6.5, 7.0, 7.5, 8.0    Protein, Urine NEGATIVE NEGATIVE, 10 (TRACE), 20 (TRACE) mg/dL    Glucose, Urine Normal Normal mg/dL    Blood, Urine NEGATIVE NEGATIVE    Ketones, Urine NEGATIVE NEGATIVE mg/dL    Bilirubin, Urine NEGATIVE NEGATIVE    Urobilinogen, Urine Normal Normal mg/dL    Nitrite, Urine NEGATIVE NEGATIVE    Leukocyte Esterase, Urine NEGATIVE NEGATIVE     *Note: Due to a large number of results and/or encounters for the requested time period, some  results have not been displayed. A complete set of results can be found in Results Review.     Imaging:  No new imaging         Assessment/Plan   Assessment & Plan  Cellulitis of leg, right    1.  Right lower extremity ulcer with Pseudomonas  -Patient states called by infectious disease to come into the hospital her culture from the ulcer was showing Pseudomonas was on Levaquin, for now I started the patient on IV cefepime and IV vancomycin wound care consult infectious disease.  -This has been ongoing issue for months states the ulcer heals then tends to come back    2.  Liable blood pressures  -Patient states her blood pressures tend to go very high then they dropped very low was previously on antihypertensives but they were discontinued because of these issues.           Teri Cramer MD

## 2025-01-27 NOTE — ED PROVIDER NOTES
HPI   Chief Complaint   Patient presents with    Skin Ulcer     Pt reports she had a culture test on an ulcer on her right lower leg 1 week ago, results showed she has a rare infection that is resistant to oral antibiotics. Primary care provider had sent her to ed. Pain in right lower leg        HPI  59-year-old female here for evaluation of a wound issue to the right lower extremity.  The patient has had this on and off for quite some time, has been on Levaquin, not getting better.  Feels like she needs of additional medication.  Her doctor recommended based on her allergies she needs to come to the emergency department for IV antibiotics.      Patient History   Past Medical History:   Diagnosis Date    Angina at rest (CMS-HCC)     Obstructive sleep apnea (adult) (pediatric)     Obstructive sleep apnea, adult    Other specified diabetes mellitus without complications     Diabetes mellitus of other type without complication    Other specified diabetes mellitus without complications     Diabetes mellitus of other type without complication    Personal history of other diseases of the circulatory system     History of hypertension    Personal history of other diseases of the circulatory system     History of angina    Personal history of other diseases of the circulatory system     History of hypertension    Personal history of other diseases of the nervous system and sense organs     History of obstructive sleep apnea    Personal history of other diseases of the respiratory system     History of asthma    Personal history of other specified conditions     History of chest pain    Unspecified asthma with (acute) exacerbation ([facility])     Asthma with acute exacerbation, unspecified asthma severity, unspecified whether persistent     Past Surgical History:   Procedure Laterality Date    OTHER SURGICAL HISTORY  12/28/2021    Knee surgery    OTHER SURGICAL HISTORY  12/28/2021    Appendectomy    OTHER SURGICAL HISTORY   12/28/2021    Hysterectomy    OTHER SURGICAL HISTORY  12/28/2021    Tonsillectomy    OTHER SURGICAL HISTORY  12/28/2021    Hysterectomy    OTHER SURGICAL HISTORY  12/28/2021    Knee surgery    OTHER SURGICAL HISTORY  12/28/2021    Breast surgery    OTHER SURGICAL HISTORY  12/28/2021    Appendectomy    OTHER SURGICAL HISTORY  12/28/2021    Tonsillectomy     Family History   Problem Relation Name Age of Onset    Migraines Daughter       Social History     Tobacco Use    Smoking status: Never    Smokeless tobacco: Never   Substance Use Topics    Alcohol use: Never    Drug use: Never       Physical Exam   ED Triage Vitals [01/27/25 1248]   Temperature Heart Rate Respirations BP   37 °C (98.6 °F) 86 16 (!) 211/115      Pulse Ox Temp Source Heart Rate Source Patient Position   97 % Temporal Monitor Sitting      BP Location FiO2 (%)     Right arm --       Physical Exam  PHYSICAL EXAMINATION    GENERAL APPEARANCE: Awake and alert.     VITAL SIGNS: As per the nurses' triage record.     HEENT: Normocephalic, atraumatic. Extraocular muscles are intact. Pupils equal round and reactive to light. Conjunctiva are pink. Negative scleral icterus. Mucous membranes are moist. Tongue in the midline. Pharynx was without erythema or exudates, uvula midline    NECK: Soft Nontender and supple, full gross ROM, no meningeal signs.    CHEST: Nontender to palpation. Clear to auscultation bilaterally. No rales, rhonchi, or wheezing.     HEART: S1, S2. Regular rate and rhythm. No murmurs, gallops or rubs.  Strong and equal pulses in the extremities.     ABDOMEN: Soft, nontender, nondistended, positive bowel sounds, no palpable masses.    MUSCULOSKELETAL: Right lower extremity has a malodorous ulcerative type wound with evidence of cellulitis extending upward.     NEUROLOGICAL: Awake, alert and oriented x 3. Power intact in the upper and lower extremities. Sensation is intact to light touch in the upper and lower extremities.     IMMUNOLOGICAL:  No lymphatic streaking noted     DERM: No petechiae, rashes, or ecchymoses.    ED Course & MDM   ED Course as of 01/27/25 1629 Mon Jan 27, 2025   1311 Initiated sepsis screening however more so because of subjective failed outpatient antibiotic treatment.  A very extensive allergy list, will consult with clinical pharmacist to discuss plan moving forward. [AP]   1627 Patient case was discussed with hospitalist Dr. Cramer.  Who accepted admission of the patient to their service [AP]      ED Course User Index  [AP] Fransisco Messina PA-C         Diagnoses as of 01/27/25 1629   Cellulitis of leg, right   Hypertension, unspecified type   Failure of outpatient treatment                 No data recorded     [place] Coma Scale Score: 14 (01/27/25 1350 : Heidi Eller RN)                           Medical Decision Making  Parts of this chart have been completed using voice recognition software. Please excuse any errors of transcription.  My thought process and reason for plan has been formulated from the time that I saw the patient until the time of disposition and is not specific to one specific moment during their visit and furthermore my MDM encompasses this entire chart and not only this text box.      HPI: Detailed above.    Exam: A medically appropriate exam performed, outlined above, given the known history and presentation.    History Limited by: Nothing    History obtained from: Patient    External/internal records reviewed: No external record reviewed    Social Determinants of Health considered during this visit: Lives at home    Chronic conditions impacting care: Obesity, chronic skin changes lower extremities    Medications given during visit:  Medications   vancomycin (Xellia) 2 g in diluent combination  mL (2 g intravenous New Bag 1/27/25 1500)   meropenem (Merrem) 1 g in sodium chloride 0.9% IV 50 mL (0 g intravenous Stopped 1/27/25 1604)   sodium chloride 0.9 % bolus 1,000 mL (1,000 mL  intravenous New Bag 1/27/25 1352)        Diagnostic/tests  Labs Reviewed   COMPREHENSIVE METABOLIC PANEL - Abnormal       Result Value    Glucose 114 (*)     Sodium 137      Potassium 4.2      Chloride 104      Bicarbonate 26      Anion Gap 11      Urea Nitrogen 21      Creatinine 0.72      eGFR >90      Calcium 9.3      Albumin 4.1      Alkaline Phosphatase 78      Total Protein 7.3      AST 12      Bilirubin, Total 0.5      ALT 12     LACTATE - Normal    Lactate 1.4      Narrative:     Venipuncture immediately after or during the administration of Metamizole may lead to falsely low results. Testing should be performed immediately prior to Metamizole dosing.   URINALYSIS WITH REFLEX CULTURE AND MICROSCOPIC - Normal    Color, Urine Light-Yellow      Appearance, Urine Clear      Specific Gravity, Urine 1.023      pH, Urine 6.5      Protein, Urine NEGATIVE      Glucose, Urine Normal      Blood, Urine NEGATIVE      Ketones, Urine NEGATIVE      Bilirubin, Urine NEGATIVE      Urobilinogen, Urine Normal      Nitrite, Urine NEGATIVE      Leukocyte Esterase, Urine NEGATIVE     SEDIMENTATION RATE, AUTOMATED - Normal    Sedimentation Rate 21     C-REACTIVE PROTEIN - Normal    C-Reactive Protein 0.74     BLOOD CULTURE   BLOOD CULTURE   TISSUE/WOUND CULTURE/SMEAR   CBC WITH AUTO DIFFERENTIAL    WBC 10.7      nRBC 0.0      RBC 4.81      Hemoglobin 13.6      Hematocrit 42.1      MCV 88      MCH 28.3      MCHC 32.3      RDW 13.0      Platelets 286      Neutrophils % 59.9      Immature Granulocytes %, Automated 0.8      Lymphocytes % 27.8      Monocytes % 7.5      Eosinophils % 3.4      Basophils % 0.6      Neutrophils Absolute 6.39      Immature Granulocytes Absolute, Automated 0.09      Lymphocytes Absolute 2.97      Monocytes Absolute 0.80      Eosinophils Absolute 0.36      Basophils Absolute 0.06     URINALYSIS WITH REFLEX CULTURE AND MICROSCOPIC    Narrative:     The following orders were created for panel order Urinalysis  with Reflex Culture and Microscopic.  Procedure                               Abnormality         Status                     ---------                               -----------         ------                     Urinalysis with Reflex C...[[id number]]  Normal              Final result               Extra Urine Gray Tube[[id number]]                                                         Please view results for these tests on the individual orders.   EXTRA URINE GRAY TUBE      No orders to display        Case discussed with: ed attending and hospitalist      Considerations/further MDM:  I spoke with Dr. Cramer. We thoroughly discussed the history, physical exam, laboratory and imaging studies, as well as, emergency department course. Based upon that discussion, we've decided to admit for further observation and evaluation of their cellulitis.  As I have deemed necessary from their history, physical, and studies, I have considered and evaluated for the following diagnoses:  Deep space infection, DVT, Karla's Gangrene, Sepsis and Toxic Shock, also considered Foreign body and injury.        Procedure  Procedures     Fransisco Messina PA-C  01/27/25 3843

## 2025-01-27 NOTE — ED PROVIDER NOTES
THIS IS MY JERROD SUPERVISORY AND SHARED VISIT NOTE:    I personally saw the patient and made/approved the management plan and take responsibility for the patient management.    History: Patient is a 59-year-old female presenting with a chief complaint of a wound to her right lower extremity.  Patient has had this off and on for a long time, has recently been on Levaquin, with no improvement, her physician states that due to her multiple allergies, she should probably come to the ER for IV antibiotics.  Patient had no fevers or chills, no nausea or vomiting, no other acute complaints noted at this time.    Exam: GENERAL APPEARANCE: Awake and alert.     HEENT: Normocephalic, atraumatic. Extraocular muscles are intact. Pupils equal round and reactive to light.  CHEST: Nontender to palpation. Clear to auscultation bilaterally. No rales, rhonchi, or wheezing.   HEART: S1, S2. Regular rate and rhythm. No murmurs, gallops or rubs.  Strong and equal pulses in the extremities.   ABDOMEN: Soft,.  non-tender.  No rebound or guarding, bowel sounds normal x 4 quadrants  NEUROLOGICAL: Awake, alert and oriented x 3.   MSK: Large wound of the right lower extremity/foot    MDM: Patient seen and evaluated at bedside, patient is in no acute distress.  I will order a wound culture, CBC, CMP, ERP, lactate, sedimentation rate, urinalysis, we will give the patient Vanco, Merrem, and normal saline. Differential diagnosis includes but is not limited to chronic wound, deep space infection, DVT, gangrene, sepsis, shock  Patient's lab work shows a glucose of 114, no other abnormalities noted, patient will be admitted to the general medicine team.    Diagnosis: Cellulitis of leg, hypertension, failure of outpatient treatment  No orders to display     Results for orders placed or performed during the hospital encounter of 01/27/25   CBC and Auto Differential    Collection Time: 01/27/25  1:16 PM   Result Value Ref Range    WBC 10.7 4.4 - 11.3  x10*3/uL    nRBC 0.0 0.0 - 0.0 /100 WBCs    RBC 4.81 4.00 - 5.20 x10*6/uL    Hemoglobin 13.6 12.0 - 16.0 g/dL    Hematocrit 42.1 36.0 - 46.0 %    MCV 88 80 - 100 fL    MCH 28.3 26.0 - 34.0 pg    MCHC 32.3 32.0 - 36.0 g/dL    RDW 13.0 11.5 - 14.5 %    Platelets 286 150 - 450 x10*3/uL    Neutrophils % 59.9 40.0 - 80.0 %    Immature Granulocytes %, Automated 0.8 0.0 - 0.9 %    Lymphocytes % 27.8 13.0 - 44.0 %    Monocytes % 7.5 2.0 - 10.0 %    Eosinophils % 3.4 0.0 - 6.0 %    Basophils % 0.6 0.0 - 2.0 %    Neutrophils Absolute 6.39 1.20 - 7.70 x10*3/uL    Immature Granulocytes Absolute, Automated 0.09 0.00 - 0.70 x10*3/uL    Lymphocytes Absolute 2.97 1.20 - 4.80 x10*3/uL    Monocytes Absolute 0.80 0.10 - 1.00 x10*3/uL    Eosinophils Absolute 0.36 0.00 - 0.70 x10*3/uL    Basophils Absolute 0.06 0.00 - 0.10 x10*3/uL   Comprehensive Metabolic Panel    Collection Time: 01/27/25  1:16 PM   Result Value Ref Range    Glucose 114 (H) 74 - 99 mg/dL    Sodium 137 136 - 145 mmol/L    Potassium 4.2 3.5 - 5.3 mmol/L    Chloride 104 98 - 107 mmol/L    Bicarbonate 26 21 - 32 mmol/L    Anion Gap 11 10 - 20 mmol/L    Urea Nitrogen 21 6 - 23 mg/dL    Creatinine 0.72 0.50 - 1.05 mg/dL    eGFR >90 >60 mL/min/1.73m*2    Calcium 9.3 8.6 - 10.3 mg/dL    Albumin 4.1 3.4 - 5.0 g/dL    Alkaline Phosphatase 78 33 - 110 U/L    Total Protein 7.3 6.4 - 8.2 g/dL    AST 12 9 - 39 U/L    Bilirubin, Total 0.5 0.0 - 1.2 mg/dL    ALT 12 7 - 45 U/L   Lactate    Collection Time: 01/27/25  1:16 PM   Result Value Ref Range    Lactate 1.4 0.4 - 2.0 mmol/L   Sedimentation rate, automated    Collection Time: 01/27/25  1:17 PM   Result Value Ref Range    Sedimentation Rate 21 0 - 30 mm/h   C-reactive protein    Collection Time: 01/27/25  1:17 PM   Result Value Ref Range    C-Reactive Protein 0.74 <1.00 mg/dL   Urinalysis with Reflex Culture and Microscopic    Collection Time: 01/27/25  1:25 PM   Result Value Ref Range    Color, Urine Light-Yellow Light-Yellow,  Yellow, Dark-Yellow    Appearance, Urine Clear Clear    Specific Gravity, Urine 1.023 1.005 - 1.035    pH, Urine 6.5 5.0, 5.5, 6.0, 6.5, 7.0, 7.5, 8.0    Protein, Urine NEGATIVE NEGATIVE, 10 (TRACE), 20 (TRACE) mg/dL    Glucose, Urine Normal Normal mg/dL    Blood, Urine NEGATIVE NEGATIVE    Ketones, Urine NEGATIVE NEGATIVE mg/dL    Bilirubin, Urine NEGATIVE NEGATIVE    Urobilinogen, Urine Normal Normal mg/dL    Nitrite, Urine NEGATIVE NEGATIVE    Leukocyte Esterase, Urine NEGATIVE NEGATIVE     *Note: Due to a large number of results and/or encounters for the requested time period, some results have not been displayed. A complete set of results can be found in Results Review.     .    Please see JERROD note for further details    Sections of this report were created using voice-to-text technology and may contain errors in translation    Jak Stanton DO  Emergency Medicine       Jak Stanton DO  01/27/25 1959

## 2025-01-27 NOTE — PROGRESS NOTES
"Pharmacy Medication History Review    Saba Hurtado \"Mrs. Saba Hurtado\" is a 59 y.o. female admitted for Wound Infection. Pharmacy reviewed the patient's [id number] medications and allergies for accuracy.    Medications ADDED:  N/A  Medications CHANGED:  N/A  Medications REMOVED:   Lasix  Synthroid  Bactroban  Omeprazole  Proair     The list below reflects the updated PTA list.   Prior to Admission Medications   Prescriptions Last Dose Informant Patient Reported? Taking?   ALPRAZolam (Xanax) 0.5 mg tablet 2025 Morning  No Yes   Sig: Take 1-2 tablets (0.5-1 mg) by mouth 2 times a day. Do not fill before 2025.   Flonase Allergy Relief 50 mcg/actuation nasal spray   No No   Sig: Administer 2 sprays into each nostril once daily.   OneTouch Ultra Control solution   No No   Si Units by in vitro route if needed (control). AS DIRECTED IN VITRO DAILY AS NEEDEDAS DIRECTED IN VITRO DAILY AS NEEDED   OneTouch Ultra2 Meter misc   No No   Si Units by in vitro route once daily.   ProAir HFA 90 mcg/actuation inhaler   No No   Sig: Inhale 2 puffs every 6 hours if needed for wheezing. USE AS DIRECTED   Patient not taking: Reported on 1/15/2025   Vivelle-Dot 0.1 mg/24 hr patch 2025  No Yes   Sig: Place 1 patch on the skin 2 times a week.   albuterol 90 mcg/actuation inhaler   No No   Sig: INHALE 2 PUFFS BY MOUTH EVERY 4 HOURS AS NEEDED FOR WHEEZING OR SHORTNESS OF BREATH   blood pressure test kit-wrist kit   No No   Sig: OMRON 7 Series wrist cuff - tests BP as needed   blood sugar diagnostic (OneTouch Ultra Test) strip   No No   Si strip by in vitro route 2 times a day.   elastic bandage (Coban) 4 X 5 \"-yard bandage   No No   Sig: Apply 1 Units topically once daily.   furosemide (Lasix) 20 mg tablet   No No   Sig: TAKE 1 TABLET(20 MG) BY MOUTH DAILY AS NEEDED FOR SWELLING   gauze bandage (Kerlix) 4 1/2 X 147 \" bandage   No No   Sig: Apply 1 Units topically once daily.   hydrocortisone " "1 % cream 2025 Morning  No Yes   Si APPLICATION EXTERNALLY TWICE A DAY 30 DAYS   ibuprofen 800 mg tablet 2025 Morning  No Yes   Sig: Take 1 tablet (800 mg) by mouth 3 times a day.   ketoconazole (NIZOral) 2 % cream Past Week  No Yes   Sig: Apply topically 2 times a day.   ketoconazole (NIZOral) 2 % shampoo Past Week  No Yes   Sig: Apply 1 Application topically 2 times a day.   lancets (OneTouch Delica Plus Lancet) 30 gauge misc   No No   Si Units by in vitro route 2 times a day.   lancets 30 gauge misc   Yes No   Sig: once daily. CHECK ONCE DAILY   levoFLOXacin (Levaquin) 750 mg tablet 2025 at  3:00 AM  No Yes   Sig: Take 1 tablet (750 mg) by mouth once every 24 hours for 14 days.   levothyroxine (Synthroid, Levoxyl) 50 mcg tablet   No No   Sig: TAKE 1 TABLET BY MOUTH EVERY DAY IN THE MORNING BEFORE FOOD X 90 DAYS   Patient not taking: Reported on 1/15/2025   mupirocin (Bactroban) 2 % ointment   No No   Sig: APPLY TO AFFECTED AREA TWICE DAILY UNTIL HEALED   Patient not taking: Reported on 1/15/2025   non-adher band-sunflowr-petrol (Oil Emulsion Dressing) 3 X 8 \" bandage   No No   Sig: Apply 1 Units topically once daily.   omeprazole (PriLOSEC) 20 mg DR capsule   No No   Sig: TAKE 1 CAPSULE BY MOUTH 30 MINUTES BEFORE MORNING MEAL ONCE A DAY FOR 90 DAY(S)   sodium chloride (Saline NasaL) 0.65 % nasal spray Past Week  No Yes   Sig: USE 2 SPRAYS IN EACH NOSTRIL AS NEEDED DAILY      Facility-Administered Medications: None        The list below reflects the updated allergy list. Please review each documented allergy for additional clarification and justification.  Allergies  Reviewed by Ruth Melo CPhT on 2025        Severity Reactions Comments    Clarithromycin High Anaphylaxis, Unknown     Diazepam High Anaphylaxis     Erythromycin High Anaphylaxis     Iodinated Contrast Media High Anaphylaxis     Lorazepam High Anaphylaxis, Unknown     Meperidine High Anaphylaxis     Morphine High " "Anaphylaxis     Norvasc [amlodipine] High Other, Cardiac arrhythmia/arrest     Oxycodone High Anaphylaxis, Unknown     Pertussis Vaccines High Anaphylaxis     Prednisone High Anaphylaxis, Unknown     Tramadol High Anaphylaxis     Unna-flex Elastic Unna Boot [zinc Oxide] High Rash Caused skin ulcers to form    Clindamycin Not Specified Nausea/vomiting     Adhesive Tape-silicones Low Rash     Amoxicillin Low Unknown     Aspirin Low Unknown     Dicyclomine Low Unknown     Diphenoxylate-atropine Low Unknown     Doxycycline Low Unknown     Hydrocodone Low Unknown     Hydrocodone-acetaminophen Low Unknown     Propoxyphene-acetaminophen Low Unknown     Pse-hydrocodone-pot Guaiaco Low Unknown             Patient declines M2B at discharge.     Sources:   Pharmacy dispense history  Patient interview Moderate historian     Additional Comments:  Pt states her prescription for Xanax needs to be a specific manufacture due to some manufactures creating an allergic reaction  Pt states the Estradiol patch needs to be brand name Vivelle-Dot  Pt states she is not taking Synthroid, she doesn't remember taking this medication but it was last filled in 9/2024  Pt states she is prescribed Lasix, but does not take the medication due to \"the doctors not being able to agree on direction/dosage\".        EBONIE MCGREGOR, [facility]  Pharmacy Technician  01/27/25     Secure Chat preferred       "

## 2025-01-28 ENCOUNTER — APPOINTMENT (OUTPATIENT)
Dept: CARDIOLOGY | Facility: HOSPITAL | Age: 60
End: 2025-01-28
Payer: COMMERCIAL

## 2025-01-28 LAB
ANION GAP SERPL CALCULATED.3IONS-SCNC: 12 MMOL/L (ref 10–20)
BUN SERPL-MCNC: 20 MG/DL (ref 6–23)
CALCIUM SERPL-MCNC: 8.8 MG/DL (ref 8.6–10.3)
CHLORIDE SERPL-SCNC: 106 MMOL/L (ref 98–107)
CO2 SERPL-SCNC: 23 MMOL/L (ref 21–32)
CREAT SERPL-MCNC: 0.74 MG/DL (ref 0.5–1.05)
EGFRCR SERPLBLD CKD-EPI 2021: >90 ML/MIN/1.73M*2
ERYTHROCYTE [DISTWIDTH] IN BLOOD BY AUTOMATED COUNT: 13.2 % (ref 11.5–14.5)
GLUCOSE SERPL-MCNC: 147 MG/DL (ref 74–99)
HCT VFR BLD AUTO: 43.6 % (ref 36–46)
HGB BLD-MCNC: 14.1 G/DL (ref 12–16)
MCH RBC QN AUTO: 28.8 PG (ref 26–34)
MCHC RBC AUTO-ENTMCNC: 32.3 G/DL (ref 32–36)
MCV RBC AUTO: 89 FL (ref 80–100)
NRBC BLD-RTO: 0 /100 WBCS (ref 0–0)
PLATELET # BLD AUTO: 268 X10*3/UL (ref 150–450)
POTASSIUM SERPL-SCNC: 3.9 MMOL/L (ref 3.5–5.3)
RBC # BLD AUTO: 4.9 X10*6/UL (ref 4–5.2)
SODIUM SERPL-SCNC: 137 MMOL/L (ref 136–145)
WBC # BLD AUTO: 10 X10*3/UL (ref 4.4–11.3)

## 2025-01-28 PROCEDURE — 99232 SBSQ HOSP IP/OBS MODERATE 35: CPT | Performed by: INTERNAL MEDICINE

## 2025-01-28 PROCEDURE — 93005 ELECTROCARDIOGRAM TRACING: CPT

## 2025-01-28 PROCEDURE — 36415 COLL VENOUS BLD VENIPUNCTURE: CPT | Performed by: INTERNAL MEDICINE

## 2025-01-28 PROCEDURE — 2500000001 HC RX 250 WO HCPCS SELF ADMINISTERED DRUGS (ALT 637 FOR MEDICARE OP)

## 2025-01-28 PROCEDURE — 80048 BASIC METABOLIC PNL TOTAL CA: CPT | Performed by: INTERNAL MEDICINE

## 2025-01-28 PROCEDURE — 2500000001 HC RX 250 WO HCPCS SELF ADMINISTERED DRUGS (ALT 637 FOR MEDICARE OP): Performed by: REGISTERED NURSE

## 2025-01-28 PROCEDURE — 1100000001 HC PRIVATE ROOM DAILY

## 2025-01-28 PROCEDURE — 2500000004 HC RX 250 GENERAL PHARMACY W/ HCPCS (ALT 636 FOR OP/ED): Mod: JZ | Performed by: INTERNAL MEDICINE

## 2025-01-28 PROCEDURE — 2500000002 HC RX 250 W HCPCS SELF ADMINISTERED DRUGS (ALT 637 FOR MEDICARE OP, ALT 636 FOR OP/ED)

## 2025-01-28 PROCEDURE — 2500000004 HC RX 250 GENERAL PHARMACY W/ HCPCS (ALT 636 FOR OP/ED): Performed by: NURSE PRACTITIONER

## 2025-01-28 PROCEDURE — 2500000004 HC RX 250 GENERAL PHARMACY W/ HCPCS (ALT 636 FOR OP/ED)

## 2025-01-28 PROCEDURE — 85027 COMPLETE CBC AUTOMATED: CPT | Performed by: INTERNAL MEDICINE

## 2025-01-28 RX ORDER — ALPRAZOLAM 0.5 MG/1
1 TABLET ORAL 2 TIMES DAILY PRN
Status: DISCONTINUED | OUTPATIENT
Start: 2025-01-28 | End: 2025-02-01 | Stop reason: HOSPADM

## 2025-01-28 RX ORDER — FLUTICASONE PROPIONATE 50 MCG
2 SPRAY, SUSPENSION (ML) NASAL DAILY
Status: DISCONTINUED | OUTPATIENT
Start: 2025-01-28 | End: 2025-02-01 | Stop reason: HOSPADM

## 2025-01-28 RX ORDER — CALCIUM CARBONATE 200(500)MG
500 TABLET,CHEWABLE ORAL DAILY
Status: DISCONTINUED | OUTPATIENT
Start: 2025-01-28 | End: 2025-02-01 | Stop reason: HOSPADM

## 2025-01-28 RX ORDER — GUAIFENESIN 600 MG/1
600 TABLET, EXTENDED RELEASE ORAL 2 TIMES DAILY PRN
Status: DISCONTINUED | OUTPATIENT
Start: 2025-01-28 | End: 2025-02-01 | Stop reason: HOSPADM

## 2025-01-28 RX ORDER — MEROPENEM AND SODIUM CHLORIDE 1 G/50ML
1 INJECTION, SOLUTION INTRAVENOUS EVERY 8 HOURS
Status: DISCONTINUED | OUTPATIENT
Start: 2025-01-28 | End: 2025-02-01 | Stop reason: HOSPADM

## 2025-01-28 RX ADMIN — CEFEPIME HYDROCHLORIDE 1 G: 1 INJECTION, SOLUTION INTRAVENOUS at 05:44

## 2025-01-28 RX ADMIN — MEROPENEM AND SODIUM CHLORIDE 1 G: 1 INJECTION, SOLUTION INTRAVENOUS at 16:57

## 2025-01-28 RX ADMIN — FLUTICASONE PROPIONATE 2 SPRAY: 50 SPRAY, METERED NASAL at 11:44

## 2025-01-28 RX ADMIN — IBUPROFEN 800 MG: 800 TABLET, FILM COATED ORAL at 11:01

## 2025-01-28 RX ADMIN — MEROPENEM AND SODIUM CHLORIDE 1 G: 1 INJECTION, SOLUTION INTRAVENOUS at 23:31

## 2025-01-28 RX ADMIN — IBUPROFEN 800 MG: 800 TABLET, FILM COATED ORAL at 17:06

## 2025-01-28 RX ADMIN — CEFEPIME HYDROCHLORIDE 1 G: 1 INJECTION, SOLUTION INTRAVENOUS at 15:12

## 2025-01-28 RX ADMIN — VANCOMYCIN 1750 MG: 1.25 INJECTION, SOLUTION INTRAVENOUS at 17:57

## 2025-01-28 RX ADMIN — ALPRAZOLAM 1 MG: 0.5 TABLET ORAL at 21:13

## 2025-01-28 RX ADMIN — VANCOMYCIN 1750 MG: 1.25 INJECTION, SOLUTION INTRAVENOUS at 06:27

## 2025-01-28 RX ADMIN — IBUPROFEN 800 MG: 800 TABLET, FILM COATED ORAL at 22:06

## 2025-01-28 SDOH — ECONOMIC STABILITY: FOOD INSECURITY: HOW HARD IS IT FOR YOU TO PAY FOR THE VERY BASICS LIKE FOOD, HOUSING, MEDICAL CARE, AND HEATING?: NOT VERY HARD

## 2025-01-28 SDOH — ECONOMIC STABILITY: FOOD INSECURITY: HOW HARD IS IT FOR YOU TO PAY FOR THE VERY BASICS LIKE FOOD, HOUSING, MEDICAL CARE, AND HEATING?: SOMEWHAT HARD

## 2025-01-28 SDOH — ECONOMIC STABILITY: FOOD INSECURITY
WITHIN THE PAST 12 MONTHS, YOU WORRIED THAT YOUR FOOD WOULD RUN OUT BEFORE YOU GOT THE MONEY TO BUY MORE.: SOMETIMES TRUE

## 2025-01-28 SDOH — SOCIAL STABILITY: SOCIAL INSECURITY: ARE THERE ANY APPARENT SIGNS OF INJURIES/BEHAVIORS THAT COULD BE RELATED TO ABUSE/NEGLECT?: NO

## 2025-01-28 SDOH — ECONOMIC STABILITY: INCOME INSECURITY: IN THE PAST 12 MONTHS HAS THE ELECTRIC, GAS, OIL, OR WATER COMPANY THREATENED TO SHUT OFF SERVICES IN YOUR HOME?: NO

## 2025-01-28 SDOH — ECONOMIC STABILITY: HOUSING INSECURITY: IN THE LAST 12 MONTHS, WAS THERE A TIME WHEN YOU WERE NOT ABLE TO PAY THE MORTGAGE OR RENT ON TIME?: NO

## 2025-01-28 SDOH — SOCIAL STABILITY: SOCIAL INSECURITY: ABUSE: ADULT

## 2025-01-28 SDOH — SOCIAL STABILITY: SOCIAL INSECURITY: DO YOU FEEL UNSAFE GOING BACK TO THE PLACE WHERE YOU ARE LIVING?: NO

## 2025-01-28 SDOH — HEALTH STABILITY: PHYSICAL HEALTH
HOW OFTEN DO YOU NEED TO HAVE SOMEONE HELP YOU WHEN YOU READ INSTRUCTIONS, PAMPHLETS, OR OTHER WRITTEN MATERIAL FROM YOUR DOCTOR OR PHARMACY?: RARELY

## 2025-01-28 SDOH — SOCIAL STABILITY: SOCIAL INSECURITY: HAVE YOU HAD ANY THOUGHTS OF HARMING ANYONE ELSE?: NO

## 2025-01-28 SDOH — SOCIAL STABILITY: SOCIAL INSECURITY: HAVE YOU HAD THOUGHTS OF HARMING ANYONE ELSE?: NO

## 2025-01-28 SDOH — ECONOMIC STABILITY: TRANSPORTATION INSECURITY: IN THE PAST 12 MONTHS, HAS LACK OF TRANSPORTATION KEPT YOU FROM MEDICAL APPOINTMENTS OR FROM GETTING MEDICATIONS?: NO

## 2025-01-28 SDOH — SOCIAL STABILITY: SOCIAL INSECURITY: WITHIN THE LAST YEAR, HAVE YOU BEEN HUMILIATED OR EMOTIONALLY ABUSED IN OTHER WAYS BY YOUR PARTNER OR EX-PARTNER?: NO

## 2025-01-28 SDOH — ECONOMIC STABILITY: HOUSING INSECURITY: AT ANY TIME IN THE PAST 12 MONTHS, WERE YOU HOMELESS OR LIVING IN A SHELTER (INCLUDING NOW)?: NO

## 2025-01-28 SDOH — ECONOMIC STABILITY: FOOD INSECURITY: WITHIN THE PAST 12 MONTHS, THE FOOD YOU BOUGHT JUST DIDN'T LAST AND YOU DIDN'T HAVE MONEY TO GET MORE.: PATIENT DECLINED

## 2025-01-28 SDOH — SOCIAL STABILITY: SOCIAL INSECURITY: WITHIN THE LAST YEAR, HAVE YOU BEEN AFRAID OF YOUR PARTNER OR EX-PARTNER?: NO

## 2025-01-28 SDOH — ECONOMIC STABILITY: FOOD INSECURITY: WITHIN THE PAST 12 MONTHS, THE FOOD YOU BOUGHT JUST DIDN'T LAST AND YOU DIDN'T HAVE MONEY TO GET MORE.: SOMETIMES TRUE

## 2025-01-28 SDOH — ECONOMIC STABILITY: HOUSING INSECURITY: IN THE PAST 12 MONTHS, HOW MANY TIMES HAVE YOU MOVED WHERE YOU WERE LIVING?: 0

## 2025-01-28 SDOH — SOCIAL STABILITY: SOCIAL INSECURITY: DOES ANYONE TRY TO KEEP YOU FROM HAVING/CONTACTING OTHER FRIENDS OR DOING THINGS OUTSIDE YOUR HOME?: NO

## 2025-01-28 SDOH — SOCIAL STABILITY: SOCIAL INSECURITY: WERE YOU ABLE TO COMPLETE ALL THE BEHAVIORAL HEALTH SCREENINGS?: YES

## 2025-01-28 SDOH — SOCIAL STABILITY: SOCIAL INSECURITY: HAS ANYONE EVER THREATENED TO HURT YOUR FAMILY OR YOUR PETS?: NO

## 2025-01-28 SDOH — SOCIAL STABILITY: SOCIAL INSECURITY: ARE YOU OR HAVE YOU BEEN THREATENED OR ABUSED PHYSICALLY, EMOTIONALLY, OR SEXUALLY BY ANYONE?: NO

## 2025-01-28 SDOH — ECONOMIC STABILITY: FOOD INSECURITY
WITHIN THE PAST 12 MONTHS, YOU WORRIED THAT YOUR FOOD WOULD RUN OUT BEFORE YOU GOT THE MONEY TO BUY MORE.: PATIENT DECLINED

## 2025-01-28 SDOH — SOCIAL STABILITY: SOCIAL INSECURITY: DO YOU FEEL ANYONE HAS EXPLOITED OR TAKEN ADVANTAGE OF YOU FINANCIALLY OR OF YOUR PERSONAL PROPERTY?: NO

## 2025-01-28 ASSESSMENT — ACTIVITIES OF DAILY LIVING (ADL)
GROOMING: INDEPENDENT
JUDGMENT_ADEQUATE_SAFELY_COMPLETE_DAILY_ACTIVITIES: YES
PATIENT'S MEMORY ADEQUATE TO SAFELY COMPLETE DAILY ACTIVITIES?: YES
FEEDING YOURSELF: INDEPENDENT
LACK_OF_TRANSPORTATION: NO
LACK_OF_TRANSPORTATION: NO
BATHING: INDEPENDENT
WALKS IN HOME: INDEPENDENT
HEARING - RIGHT EAR: FUNCTIONAL
HEARING - LEFT EAR: FUNCTIONAL
TOILETING: INDEPENDENT
ASSISTIVE_DEVICE: WALKER
DRESSING YOURSELF: INDEPENDENT
LACK_OF_TRANSPORTATION: NO
ADEQUATE_TO_COMPLETE_ADL: YES
LACK_OF_TRANSPORTATION: NO

## 2025-01-28 ASSESSMENT — LIFESTYLE VARIABLES
HOW OFTEN DO YOU HAVE A DRINK CONTAINING ALCOHOL: NEVER
AUDIT-C TOTAL SCORE: 0
SKIP TO QUESTIONS 9-10: 1
HOW OFTEN DO YOU HAVE 6 OR MORE DRINKS ON ONE OCCASION: NEVER
AUDIT-C TOTAL SCORE: 0
HOW MANY STANDARD DRINKS CONTAINING ALCOHOL DO YOU HAVE ON A TYPICAL DAY: PATIENT DOES NOT DRINK

## 2025-01-28 ASSESSMENT — ENCOUNTER SYMPTOMS
NEUROLOGICAL NEGATIVE: 1
MUSCULOSKELETAL NEGATIVE: 1
ENDOCRINE NEGATIVE: 1
FATIGUE: 1
RESPIRATORY NEGATIVE: 1
EYES NEGATIVE: 1
PSYCHIATRIC NEGATIVE: 1
GASTROINTESTINAL NEGATIVE: 1

## 2025-01-28 ASSESSMENT — COGNITIVE AND FUNCTIONAL STATUS - GENERAL
MOBILITY SCORE: 24
DAILY ACTIVITIY SCORE: 24
PATIENT BASELINE BEDBOUND: NO

## 2025-01-28 ASSESSMENT — PAIN DESCRIPTION - LOCATION
LOCATION: LEG
LOCATION: LEG

## 2025-01-28 ASSESSMENT — PAIN - FUNCTIONAL ASSESSMENT: PAIN_FUNCTIONAL_ASSESSMENT: 0-10

## 2025-01-28 ASSESSMENT — PAIN DESCRIPTION - ORIENTATION
ORIENTATION: RIGHT
ORIENTATION: RIGHT

## 2025-01-28 ASSESSMENT — PAIN DESCRIPTION - PAIN TYPE: TYPE: ACUTE PAIN

## 2025-01-28 ASSESSMENT — PAIN SCALES - GENERAL
PAINLEVEL_OUTOF10: 2
PAINLEVEL_OUTOF10: 6
PAINLEVEL_OUTOF10: 6

## 2025-01-28 NOTE — CONSULTS
Vancomycin Dosing by Pharmacy- INITIAL    Saba Hurtado is a 59 y.o. year old female who Pharmacy has been consulted for vancomycin dosing for cellulitis, skin and soft tissue. Based on the patient's indication and renal status this patient will be dosed based on a goal AUC of 400-600.     Renal function is currently stable.    Visit Vitals  BP (!) 187/107   Pulse 82   Temp 37 °C (98.6 °F) (Temporal)   Resp 18        Lab Results   Component Value Date    CREATININE 0.72 2025    CREATININE 0.71 2024    CREATININE 0.70 2024    CREATININE 0.70 2024        Patient weight is as follows:   Vitals:    25 1248   Weight: 139 kg (306 lb 3.5 oz)       Cultures:  No results found for the encounter in last 14 days.        No intake/output data recorded.  I/O during current shift:  No intake/output data recorded.    Temp (24hrs), Av °C (98.6 °F), Min:37 °C (98.6 °F), Max:37 °C (98.6 °F)         Assessment/Plan     Patient will not be given a loading dose.  Will initiate vancomycin maintenance,  1750 mg every 12 hours.    This dosing regimen is predicted by [employer] to result in the following pharmacokinetic parameters:  <<<<<[employer] DATA>>>>>  Loading dose: N/A  Regimen: 1750 mg IV every 12 hours.  Start time: 03:00 on 2025  Exposure target: AUC24 (range)400-600 mg/L.hr   [id number]: 507 mg/L.hr  AUC24,ss: 509 mg/L.hr  Probability of AUC24 > 400: 67 %  Ctrough,ss: 11.6 mg/L  Probability of Ctrough,ss > 20: 28 %    Follow-up level will be ordered on  at 0500 unless clinically indicated sooner.  Will continue to monitor renal function daily while on vancomycin and order serum creatinine at least every 48 hours if not already ordered.  Follow for continued vancomycin needs, clinical response, and signs/symptoms of toxicity.       ANYA Velasco, GeorginaD

## 2025-01-28 NOTE — PROGRESS NOTES
"Saba Yang \"Mrs. Saba Garcia" is a 59 y.o. female on day 1 of admission presenting with Cellulitis of leg, right.      Subjective   Patient feels her leg is doing a little bit better today.  Otherwise no complaints.  Wanted to know when I thought she would be discharged.       Objective     Last Recorded Vitals  /90   Pulse 75   Temp 36.5 °C (97.7 °F) (Temporal)   Resp 18   Wt 139 kg (306 lb 3.5 oz)   SpO2 98%   Intake/Output last 3 Shifts:  No intake or output data in the 24 hours ending 01/28/25 1647    Admission Weight  Weight: 139 kg (306 lb 3.5 oz) (01/27/25 1248)    Daily Weight  01/27/25 : 139 kg (306 lb 3.5 oz)    Image Results  Vascular US lower extremity venous insufficiency bilateral            [place] [address]   Tel [phone] and Fax [phone]       Vascular Lab Report  VASC US LOWER EXTREMITY VENOUS INSUFFICIENCY BILATERAL       Patient Name:      SABA YANG  Reading Physician: [id number] Keely Fitch MD  Study Date:        12/4/2024          Ordering           [id number] CAMILLA GERARDO                                        Physician:  MRN/PID:           [id number]           Technologist:      Sarah Winters RVEVONNE  Accession#:        [id number]       Technologist 2:  Date of Birth/Age: 1965 / 59      Encounter#:        [id number]                     years  Gender:            F  Admission Status:  Outpatient         Location           [facility]                                        Performed:       Diagnosis/ICD:    Venous insufficiency (chronic) (peripheral)-I87.2; Cellulitus                    of right lower limb-L03.115  CPT Codes:        08168 Venous reflux study VV VI complete  Patient Position: Study performed in a reverse Trendelenburg position.       CONCLUSIONS:  Right Lower Venous Insufficiency: There is deep venous reflux noted in the distal " external iliac vein measuring 2.46 seconds. There is deep venous reflux noted in the common femoral vein measuring 1.03 seconds. There is deep venous reflux noted in the right mid femoral vein measuring 1.33 seconds.  Left Lower Venous Insufficiency: Left leg demonstrates no evidence of deep vein thrombosis or deep or superficial venous insufficiency.  noted in left mid calf however, appears negative for reflux.  Right Lower Venous: No evidence of acute deep vein thrombus visualized in the right lower extremity. Additional Findings; Lymph nodes noted in right groin measuring 3.79 cm x 1.93 cm. Bilateral calf vessels visualized in segments due to pateint body habitus. Cannot rule out thrombus in non-visualized segments.  Left Lower Venous: No evidence of acute deep vein thrombus visualized in the left lower extremity. Bilateral calf vessels visualized in segments due to pateint body habitus. Cannot rule out thrombus in non-visualized segments.     Additional Findings:  Technically difficult study due to patient body habitus and positioning.       Imaging & Doppler Findings:     Right          Compress Thrombus  SFJ              Yes      None  Prox Thigh GSV   Yes      None  Mid Thigh GSV    Yes      None  Knee GSV         Yes      None  Prox Calf GSV    Yes      None  Mid Calf GSV     Yes      None  Dist Calf GSV    Yes      None  SPJ              Yes      None  SSV Prox         Yes      None  SSV Mid          Yes      None  SSV Distal       Yes      None       Left           Compress Thrombus  SFJ              Yes      None  Prox Thigh GSV   Yes      None  Mid Thigh GSV    Yes      None  Knee GSV         Yes      None  Prox Calf GSV    Yes      None  Mid Calf GSV     Yes      None  Dist Calf GSV    Yes      None  SPJ              Yes      None  SSV Prox         Yes      None  SSV Mid          Yes      None  SSV Distal       Yes      None       Right                 Compressible Thrombus        Flow  Distal  External Iliac                None         Reflux  CFV                       Yes        None         Reflux  PFV                       Yes        None  FV Proximal               Yes        None  FV Mid                    Yes        None         Reflux  FV Distal                 Yes        None  Popliteal                 Yes        None   Spontaneous/Phasic  Peroneal                  Yes        None  PTV                       Yes        None       Left                  Compress Thrombus        Flow  Distal External Iliac            None  CFV                     Yes      None   Spontaneous/Phasic  PFV                     Yes      None  FV Proximal             Yes      None   Spontaneous/Phasic  FV Mid                  Yes      None  FV Distal               Yes      None  Popliteal               Yes      None   Spontaneous/Phasic  Peroneal                Yes      None  PTV                     Yes      None       [id number] Keely Fitch MD  Electronically signed by [id number] Keely Fitch MD on 12/5/2024 at 6:02:27 PM       ** Final **      Physical Exam  Generally no acute distress  HEENT PERRL EOMI  Cardiovascular S1-S2 regular rate rhythm  Lungs clear to auscultation  Abdomen bowel sounds present nontender  Extremities right lower extremity with chronic venous stasis appearance in ulcers noted    Relevant Results  Scheduled medications  calcium carbonate, 500 mg, oral, Daily  enoxaparin, 40 mg, subcutaneous, q12h JESUS  [START ON 1/30/2025] estradiol, 1 patch, transdermal, Once per day on Monday Thursday  fluticasone, 2 spray, Each Nostril, Daily  ibuprofen, 800 mg, oral, TID  meropenem, 1 g, intravenous, q8h  polyethylene glycol, 17 g, oral, Daily  vancomycin, 1,750 mg, intravenous, q12h      Continuous medications     PRN medications  PRN medications: acetaminophen **OR** acetaminophen **OR** acetaminophen, albuterol, ALPRAZolam, guaiFENesin, ondansetron ODT **OR** ondansetron, sodium chloride, vancomycin                Assessment/Plan      Impression: 59-year-old woman admitted with recurrent right lower extremity infection, now with Pseudomonas.      Plan:    1.  Right lower extremity pseudomonal function  -Appreciate infectious disease input, continue antibiotics per their recommendations.  Continue wrapping wound per nursing.  Will need to discuss with ID long term plan but waiting for blood culture to return first.                    Chepe Escobedo MD

## 2025-01-28 NOTE — CONSULTS
"Inpatient consult to Infectious Diseases  Consult performed by: Annmarie Lombardi, APRN-CNP  Consult ordered by: Teri Cramer MD  Reason for consult: Right leg infection        Primary MD: Constantine Gomez PA-C        History Of Present Illness  Saba Hurtado \"Mrs. Saba Garcia" is a 59 y.o. female medical history of type 2 diabetes mellitus, venous insufficiency chronic venous stasis ulceration and lymphedema of right lower extremity.  She reports increased pain to right lower extremity.  She reports increased swelling, drainage concern for worsening infection.  She reports recent wound cultures grew pseudomonas-resistant to cefepime, levaquin- and MSSA, PCP recommended she come to the emergency room for IV antibiotics.  She is afebrile, denies chills or sweats.  She denies shortness of breath cough or chest pain.  She denies nausea vomiting or diarrhea.  She does report she is having trouble sleeping.  Labs with no leukocytosis.  Repeat wound cultures and blood culture are pending.  Pictures in epic reviewed right lower extremity swelling, erythema, Increased serosanguineous drainage.  History of extensive antibiotic allergies and/or intolerances.  She was admitted for  further evaluation and management.  She is on IV cefepime, vancomycin.       Past Medical History  She has a past medical history of Angina at rest (CMS-HCC), Obstructive sleep apnea (adult) (pediatric), Personal history of other diseases of the circulatory system, Personal history of other diseases of the circulatory system, Personal history of other diseases of the circulatory system, Personal history of other diseases of the nervous system and sense organs, Personal history of other diseases of the respiratory system, Personal history of other specified conditions, and Unspecified asthma with (acute) exacerbation ([facility]).    She has no past medical history of Diabetes mellitus (Multi).    Surgical History  She has a past " surgical history that includes Other surgical history (12/28/2021); Other surgical history (12/28/2021); Other surgical history (12/28/2021); Other surgical history (12/28/2021); Other surgical history (12/28/2021); Other surgical history (12/28/2021); Other surgical history (12/28/2021); Other surgical history (12/28/2021); and Other surgical history (12/28/2021).     Social History     Occupational History    Not on file   Tobacco Use    Smoking status: Never    Smokeless tobacco: Never   Vaping Use    Vaping status: Never Used   Substance and Sexual Activity    Alcohol use: Never    Drug use: Never    Sexual activity: Not on file     Travel History   Travel since 12/28/24    No documented travel since 12/28/24              Family History  Family History   Problem Relation Name Age of Onset    Migraines Daughter       Allergies  Clarithromycin, Diazepam, Erythromycin, Iodinated contrast media, Lorazepam, Meperidine, Morphine, Norvasc [amlodipine], Oxycodone, Pertussis vaccines, Prednisone, Tramadol, Unna-flex elastic unna boot [zinc oxide], Clindamycin, Adhesive tape-silicones, Amoxicillin, Aspirin, Dicyclomine, Diphenoxylate-atropine, Doxycycline, Hydrocodone, Hydrocodone-acetaminophen, Propoxyphene-acetaminophen, and Pse-hydrocodone-pot guaiaco       There is no immunization history on file for this patient.  Medications  Home medications:  Medications Prior to Admission   Medication Sig Dispense Refill Last Dose/Taking    ibuprofen 800 mg tablet Take 1 tablet (800 mg) by mouth 3 times a day. 90 tablet 3 1/27/2025 Morning    sodium chloride (Saline NasaL) 0.65 % nasal spray USE 2 SPRAYS IN EACH NOSTRIL AS NEEDED DAILY 44 mL 11 Past Week    Vivelle-Dot 0.1 mg/24 hr patch Place 1 patch on the skin 2 times a week. 24 patch 3 1/27/2025    albuterol 90 mcg/actuation inhaler INHALE 2 PUFFS BY MOUTH EVERY 4 HOURS AS NEEDED FOR WHEEZING OR SHORTNESS OF BREATH 8.5 g 1     blood pressure test kit-wrist kit OMRON 7 Series  "wrist cuff - tests BP as needed 1 each 0     elastic bandage (Coban) 4 X 5 \"-yard bandage Apply 1 Units topically once daily. 30 each 3     Flonase Allergy Relief 50 mcg/actuation nasal spray Administer 2 sprays into each nostril once daily. 16 g 11     furosemide (Lasix) 20 mg tablet TAKE 1 TABLET(20 MG) BY MOUTH DAILY AS NEEDED FOR SWELLING 90 tablet 1     gauze bandage (Kerlix) 4 1/2 X 147 \" bandage Apply 1 Units topically once daily. 30 each 3     lancets (OneTouch Delica Plus Lancet) 30 gauge misc 1 Units by in vitro route 2 times a day. 100 each 3     lancets 30 gauge misc once daily. CHECK ONCE DAILY       levothyroxine (Synthroid, Levoxyl) 50 mcg tablet TAKE 1 TABLET BY MOUTH EVERY DAY IN THE MORNING BEFORE FOOD X 90 DAYS (Patient not taking: Reported on 1/15/2025) 90 tablet 1     OneTouch Ultra Control solution 1 Units by in vitro route if needed (control). AS DIRECTED IN VITRO DAILY AS NEEDEDAS DIRECTED IN VITRO DAILY AS NEEDED 1 each 1     OneTouch Ultra2 Meter misc 1 Units by in vitro route once daily. 1 each 1     ProAir HFA 90 mcg/actuation inhaler Inhale 2 puffs every 6 hours if needed for wheezing. USE AS DIRECTED (Patient not taking: Reported on 1/15/2025) 8.5 g 2      Current medications:  Scheduled medications  calcium carbonate, 500 mg, oral, Daily  cefepime, 1 g, intravenous, q8h  enoxaparin, 40 mg, subcutaneous, q12h JESUS  [START ON 1/30/2025] estradiol, 1 patch, transdermal, Once per day on Monday Thursday  fluticasone, 2 spray, Each Nostril, Daily  ibuprofen, 800 mg, oral, TID  polyethylene glycol, 17 g, oral, Daily  vancomycin, 1,750 mg, intravenous, q12h      Continuous medications     PRN medications  PRN medications: acetaminophen **OR** acetaminophen **OR** acetaminophen, albuterol, ALPRAZolam, guaiFENesin, ondansetron ODT **OR** ondansetron, sodium chloride, vancomycin    Review of Systems   Constitutional:  Positive for fatigue.   HENT: Negative.     Eyes: Negative.    Respiratory: " "Negative.     Cardiovascular:  Positive for leg swelling.   Gastrointestinal: Negative.    Endocrine: Negative.    Genitourinary: Negative.    Musculoskeletal: Negative.    Skin:  Positive for rash.   Neurological: Negative.    Psychiatric/Behavioral: Negative.          Objective  Range of Vitals (last 24 hours)  Heart Rate:  [70-88]   Temp:  [36.7 °C (98.1 °F)-37 °C (98.6 °F)]   Resp:  [16-20]   BP: (160-229)/()   Height:  [172.7 cm (5' 8\")]   Weight:  [139 kg (306 lb 3.5 oz)]   SpO2:  [94 %-98 %]   Daily Weight  01/27/25 : 139 kg (306 lb 3.5 oz)    Body mass index is 46.56 kg/m².     Physical Exam  Constitutional:       Appearance: Normal appearance.   HENT:      Head: Normocephalic and atraumatic.      Nose: Nose normal.      Mouth/Throat:      Mouth: Mucous membranes are moist.      Pharynx: Oropharynx is clear.   Eyes:      General: No scleral icterus.  Cardiovascular:      Rate and Rhythm: Normal rate and regular rhythm.   Pulmonary:      Effort: Pulmonary effort is normal.      Breath sounds: Normal breath sounds.   Abdominal:      General: Bowel sounds are normal.      Palpations: Abdomen is soft.   Musculoskeletal:         General: Normal range of motion.      Cervical back: Normal range of motion and neck supple.   Skin:     General: Skin is warm and dry.      Comments: Pictures in epic reviewed right lower extremity swelling, erythema, Increased serosanguineous drainage noted on dressing   Neurological:      General: No focal deficit present.      Mental Status: She is alert.   Psychiatric:         Mood and Affect: Mood normal.         Behavior: Behavior normal.          Relevant Results  Outside Hospital Results  No  Labs  Results from last 72 hours   Lab Units 01/28/25  0545 01/27/25  1316   WBC AUTO x10*3/uL 10.0 10.7   HEMOGLOBIN g/dL 14.1 13.6   HEMATOCRIT % 43.6 42.1   PLATELETS AUTO x10*3/uL 268 286   NEUTROS PCT AUTO %  --  59.9   LYMPHS PCT AUTO %  --  27.8   MONOS PCT AUTO %  --  7.5   EOS " PCT AUTO %  --  3.4     Results from last 72 hours   Lab Units 01/28/25  0545 01/27/25  1316   SODIUM mmol/L 137 137   POTASSIUM mmol/L 3.9 4.2   CHLORIDE mmol/L 106 104   CO2 mmol/L 23 26   BUN mg/dL 20 21   CREATININE mg/dL 0.74 0.72   GLUCOSE mg/dL 147* 114*   CALCIUM mg/dL 8.8 9.3   ANION GAP mmol/L 12 11   EGFR mL/min/1.73m*2 >90 >90     Results from last 72 hours   Lab Units 01/27/25  1316   ALK PHOS U/L 78   BILIRUBIN TOTAL mg/dL 0.5   PROTEIN TOTAL g/dL 7.3   ALT U/L 12   AST U/L 12   ALBUMIN g/dL 4.1     Estimated Creatinine Clearance: 122.8 mL/min (by C-G formula based on SCr of 0.74 mg/dL).  C-Reactive Protein   Date Value Ref Range Status   01/27/2025 0.74 <1.00 mg/dL Final   08/22/2024 1.40 0.00 - 2.00 mg/dL Final     Sedimentation Rate   Date Value Ref Range Status   01/27/2025 21 0 - 30 mm/h Final   08/22/2024 26 0 - 30 mm/h Final     HIV 1 and 2 Screen   Date Value Ref Range Status   04/04/2023   Final    NONREACTIVE  Reference range: NONREACTIVE     HIV Ag/Ab screen is performed using the [employer]   HIV Ag/Ab Combo assay which detects the presence of HIV    p24 antigen as well as antibodies to HIV-1   (Group M and O) and HIV-2.  .  No laboratory evidence of HIV infection. If acute HIV infection is   suspected, consider testing for HIV RNA by PCR (viral load).  Test Performed at:  [facility]), [id number] , [place], [address]  :          HIV 1/2 Antibody Confirmation   Date Value Ref Range Status   10/21/2021 Test Not Indicated  Final     Hepatitis C Ab   Date Value Ref Range Status   04/04/2023   Final    NONREACTIVE  Reference range: NONREACTIVE     Results from patients taking biotin supplements or receiving   high-dose biotin therapy should be interpreted with caution   due to possible interference with this test. Providers may    contact their local laboratory for further information.  Test Performed at:  [facility]), [address] , [place], [place],  [address]  :          Microbiology  Susceptibility data from last 90 days.  Collected Specimen Info Organism Amikacin Amoxicillin/Clavulanate Ampicillin Ampicillin/Sulbactam Aztreonam Cefazolin Cefepime Cefotaxime Ceftazidime Ceftriaxone Cefuroxime (oral) Ciprofloxacin Clindamycin   01/14/25 Tissue/Biopsy from Wound/Tissue Methicillin Susceptible Staphylococcus aureus (MSSA)              S     Pseudomonas aeruginosa  S     I   R   I    R      Mixed Gram-Positive and Gram-Negative Bacteria                11/27/24 Tissue/Biopsy from Wound/Tissue Mixed Gram-Positive and Gram-Negative Bacteria                11/13/24 Tissue/Biopsy from Wound/Tissue Escherichia coli  R  I  R  S  R  R  R  R  R  R  R  R      Mixed Anaerobic Bacteria                  Collected Specimen Info Organism Ertapenem Erythromycin Gentamicin Imipenem Levofloxacin Meropenem Oxacillin Piperacillin/Tazobactam Tetracycline Tobramycin Trimethoprim/Sulfamethoxazole Vancomycin   01/14/25 Tissue/Biopsy from Wound/Tissue Methicillin Susceptible Staphylococcus aureus (MSSA)   S      S   S   S  S     Pseudomonas aeruginosa      R  S   S   R       Mixed Gram-Positive and Gram-Negative Bacteria               11/27/24 Tissue/Biopsy from Wound/Tissue Mixed Gram-Positive and Gram-Negative Bacteria               11/13/24 Tissue/Biopsy from Wound/Tissue Escherichia coli  S   R  I  R  S   S   R  R      Mixed Anaerobic Bacteria                     Imaging  No results found.    Assessment/Plan   Right leg cellulitis-recent wound culture grew Pseudomonas-resistant to cefepime, Levaquin-susceptible to zosyn, merrem and MSSA  Multiple antibiotic intolerances/allergies-has tolerated cefepime, merrem  History of pseudomonas, MSSA       Discontinue IV cefepime-recent culture resistant  Start IV meropenem   IV vancomycin  Monitor temperature and WBC  Follow-up wound culture  Follow blood culture  Monitor temperature and WBC  Local care  Right leg  elevation    otal time spent caring for the patient today was 35 minutes. This includes time spent before the visit reviewing the chart, time spent during the visit, and time spent after the visit on documentation.   DEVANG Walton-CNP

## 2025-01-28 NOTE — PROGRESS NOTES
Vancomycin Dosing by Pharmacy- FOLLOW UP    aSba Hurtado is a 59 y.o. year old female who Pharmacy has been consulted for vancomycin dosing for cellulitis, skin and soft tissue. Based on the patient's indication and renal status this patient is being dosed based on a goal AUC of 400-600.     Renal function is currently stable.    Current vancomycin dose: 1750 mg given every 12 hours    Estimated vancomycin AUC on current dose: 523 mg/L.hr     Visit Vitals  /78 (BP Location: Left arm, Patient Position: Lying)   Pulse 76   Temp 37 °C (98.6 °F) (Temporal)   Resp 18        Lab Results   Component Value Date    CREATININE 0.74 2025    CREATININE 0.72 2025    CREATININE 0.71 2024    CREATININE 0.70 2024        Patient weight is as follows:   Vitals:    25 1248   Weight: 139 kg (306 lb 3.5 oz)       Cultures:  No results found for the encounter in last 14 days.       No intake/output data recorded.  I/O during current shift:  No intake/output data recorded.    Temp (24hrs), Av °C (98.6 °F), Min:37 °C (98.6 °F), Max:37 °C (98.6 °F)      Assessment/Plan    Within goal AUC range. Continue current vancomycin regimen.    This dosing regimen is predicted by [employer] to result in the following pharmacokinetic parameters:    Loading dose: N/A  Regimen: 1750 mg IV every 12 hours.  Start time: 18:27 on 2025  Exposure target: AUC24 (range)400-600 mg/L.hr   EEO80-01: 521 mg/L.hr  AUC24,ss: 523 mg/L.hr  Probability of AUC24 > 400: 69 %  Ctrough,ss: 12.1 mg/L  Probability of Ctrough,ss > 20: 30 %    The next level will be obtained on 2/3/25 at AM labs. May be obtained sooner if clinically indicated.   Will continue to monitor renal function daily while on vancomycin and order serum creatinine at least every 48 hours if not already ordered.  Follow for continued vancomycin needs, clinical response, and signs/symptoms of toxicity.       Fly Fox, [facility]

## 2025-01-28 NOTE — CARE PLAN
Problem: Pain - Adult  Goal: Verbalizes/displays adequate comfort level or baseline comfort level  Outcome: Progressing     Problem: Safety - Adult  Goal: Free from fall injury  Outcome: Progressing     Problem: Discharge Planning  Goal: Discharge to home or other facility with appropriate resources  Outcome: Progressing     Problem: Chronic Conditions and Co-morbidities  Goal: Patient's chronic conditions and co-morbidity symptoms are monitored and maintained or improved  Outcome: Progressing     Problem: Nutrition  Goal: Nutrient intake appropriate for maintaining nutritional needs  Outcome: Progressing     Problem: Fall/Injury  Goal: Not fall by end of shift  Outcome: Progressing  Goal: Be free from injury by end of the shift  Outcome: Progressing  Goal: Verbalize understanding of personal risk factors for fall in the hospital  Outcome: Progressing  Goal: Verbalize understanding of risk factor reduction measures to prevent injury from fall in the home  Outcome: Progressing  Goal: Use assistive devices by end of the shift  Outcome: Progressing  Goal: Pace activities to prevent fatigue by end of the shift  Outcome: Progressing     Problem: Pain  Goal: Takes deep breaths with improved pain control throughout the shift  Outcome: Progressing  Goal: Turns in bed with improved pain control throughout the shift  Outcome: Progressing  Goal: Walks with improved pain control throughout the shift  Outcome: Progressing  Goal: Performs ADL's with improved pain control throughout shift  Outcome: Progressing  Goal: Participates in PT with improved pain control throughout the shift  Outcome: Progressing  Goal: Free from opioid side effects throughout the shift  Outcome: Progressing  Goal: Free from acute confusion related to pain meds throughout the shift  Outcome: Progressing

## 2025-01-28 NOTE — PROGRESS NOTES
01/28/25 0948   Discharge Planning   Living Arrangements Spouse/significant other   Support Systems Spouse/significant other   Assistance Needed Patient reports she is independent of ADLs and IADLs   Type of Residence Private residence   Number of Stairs to Enter Residence 0   Number of Stairs Within Residence 0   Who is requesting discharge planning? Provider   Home or Post Acute Services None   Expected Discharge Disposition Home  (with outpatient antibiotics per the patient)   Does the patient need discharge transport arranged? Yes   RoundTrip coordination needed? Yes   Has discharge transport been arranged? No   Financial Resource Strain   How hard is it for you to pay for the very basics like food, housing, medical care, and heating? Somewhat   Housing Stability   In the last 12 months, was there a time when you were not able to pay the mortgage or rent on time? N   In the past 12 months, how many times have you moved where you were living? 0   At any time in the past 12 months, were you homeless or living in a shelter (including now)? N   Transportation Needs   In the past 12 months, has lack of transportation kept you from medical appointments or from getting medications? no   In the past 12 months, has lack of transportation kept you from meetings, work, or from getting things needed for daily living? No   Stroke Family Assessment   Stroke Family Assessment Needed No   Intensity of Service   Intensity of Service 0-30 min     MSW met with patient and spouse at bedside. She reports she is independent at home with the use of a rollator. Patient reports they do have some difficulty with paying bills/obtaining food. However, reports they are aware of community resources and the same were reviewed. Encouraged them to reach out for assistance again due to it being the beginning of the year, and resources may have funds available to assist. Patient states her understanding to the same.    Patient reports plan is  for home with outpatient antibiotics at [facility]. Patient currently declining home care and SNF. Will await recommendations from ID and follow up accordingly. Care Transitions will follow.

## 2025-01-28 NOTE — CONSULTS
"Nutrition Assessement Note    Nutrition Assessment    Reason for Assessment: Admission nursing screening    Reason for Hospital Admission:  Saba Hurtado \"Mrs. Saba Hurtado\" is a 59 y.o. female who is admitted for RLE cellulitis.     Malnutrition Screening Tool (MST)  Have you recently lost weight without trying?: No  Weight Loss Score: 0  Have you been eating poorly because of a decreased appetite?: No  Malnutrition Score: 0  Nutrition Screen  Stage 3 or 4 Pressure Injury or Multiple Non-Healing Wounds: Yes (Comment)  Home Tube Feeding or Total Parenteral Nutrition (TPN): No  Dietitian Consult Needed: No    Past Medical History:   Diagnosis Date    Angina at rest (CMS-HCC)     Obstructive sleep apnea (adult) (pediatric)     Obstructive sleep apnea, adult    Personal history of other diseases of the circulatory system     History of hypertension    Personal history of other diseases of the circulatory system     History of angina    Personal history of other diseases of the circulatory system     History of hypertension    Personal history of other diseases of the nervous system and sense organs     History of obstructive sleep apnea    Personal history of other diseases of the respiratory system     History of asthma    Personal history of other specified conditions     History of chest pain    Unspecified asthma with (acute) exacerbation ([facility])     Asthma with acute exacerbation, unspecified asthma severity, unspecified whether persistent      Past Surgical History:   Procedure Laterality Date    OTHER SURGICAL HISTORY  12/28/2021    Knee surgery    OTHER SURGICAL HISTORY  12/28/2021    Appendectomy    OTHER SURGICAL HISTORY  12/28/2021    Hysterectomy    OTHER SURGICAL HISTORY  12/28/2021    Tonsillectomy    OTHER SURGICAL HISTORY  12/28/2021    Hysterectomy    OTHER SURGICAL HISTORY  12/28/2021    Knee surgery    OTHER SURGICAL HISTORY  12/28/2021    Breast surgery    OTHER SURGICAL HISTORY  " "12/28/2021    Appendectomy    OTHER SURGICAL HISTORY  12/28/2021    Tonsillectomy     Nutrition History:  Food and Nutrient History: reports having a good appetite. reviewed menu with pt and answered questions. she reports she does not have DM but monitors her BGs at home. informed her BGs can increase with infections/inflammation from wounds. encoruaged protein intake to aid in wound healing.  Energy Intake: Good > 75 %    Anthropometrics:  Ht: 172.7 cm (5' 8\"), Wt: 139 kg (306 lb 3.5 oz), BMI: 46.57  IBW/kg (Dietitian Calculated): 63.64 kg  Percent of IBW: 218 %  Adjusted Body Weight (kg): 82.5 kg    Weight Change:  Daily Weight  01/27/25 : 139 kg (306 lb 3.5 oz)  01/15/25 : 136 kg (300 lb)  01/14/25 : 137 kg (303 lb)  12/18/24 : 140 kg (309 lb)  10/23/24 : 142 kg (312 lb)  09/12/24 : 142 kg (312 lb)  08/22/24 : 141 kg (310 lb)  07/17/24 : 147 kg (323 lb 9.6 oz)  06/21/24 : 146 kg (321 lb)  05/07/24 : 147 kg (325 lb)     Weight History / % Weight Change: pt denies any recent significant wt changes    Nutrition Focused Physical Exam Findings:   Subcutaneous Fat Loss  Orbital Fat Pads: Well nourished (slightly bulging fat pads)  Buccal Fat Pads: Well nourished (full, rounded cheeks)    Muscle Wasting  Temporalis: Well nourished (well-defined muscle)  Pectoralis (Clavicular Region): Well nourished (clavicle not visible)  Deltoid/Trapezius: Well nourished (rounded appearance at arm, shoulder, neck)    Edema  Edema:  (nonpitting)  Edema Location: BLE    Physical Findings  Skin: Positive  Positive Skin Findings:  (cellulitis RLE)    Nutrition Significant Labs:  Lab Results   Component Value Date    WBC 10.0 01/28/2025    HGB 14.1 01/28/2025    HCT 43.6 01/28/2025     01/28/2025    CHOL 151 04/04/2023    TRIG 76 04/04/2023    HDL 48 (L) 04/04/2023    ALT 12 01/27/2025    AST 12 01/27/2025     01/28/2025    K 3.9 01/28/2025     01/28/2025    CREATININE 0.74 01/28/2025    BUN 20 01/28/2025    CO2 23 " 01/28/2025    TSH 1.46 04/04/2023    HGBA1C 6.7 (H) 08/22/2024     Nutrition Specific Medications:  calcium carbonate, 500 mg, oral, Daily  cefepime, 1 g, intravenous, q8h  enoxaparin, 40 mg, subcutaneous, q12h JESUS  [START ON 1/30/2025] estradiol, 1 patch, transdermal, Once per day on Monday Thursday  fluticasone, 2 spray, Each Nostril, Daily  ibuprofen, 800 mg, oral, TID  polyethylene glycol, 17 g, oral, Daily  vancomycin, 1,750 mg, intravenous, q12h      Dietary Orders (From admission, onward)       Start     Ordered    01/28/25 0906  May Participate in Room Service  ( ROOM SERVICE MAY PARTICIPATE)  Once        Question:  .  Answer:  Yes    01/28/25 0905 01/27/25 2052  Adult diet Regular  Diet effective now        Question:  Diet type  Answer:  Regular    01/27/25 2052                   Estimated Needs:   Estimated Energy Needs  Total Energy Estimated Needs in 24 hours (kCal): 2063 kCal  Energy Estimated Needs per kg Body Weight in 24 hours (kCal/kg): 25 kCal/kg  Method for Estimating Needs: ABW    Estimated Protein Needs  Total Protein Estimated Needs in 24 Hours (g): 99 g  Protein Estimated Needs per kg Body Weight in 24 Hours (g/kg): 1.2 g/kg  Method for Estimating 24 Hour Protein Needs: ABW    Estimated Fluid Needs  Method for Estimating 24 Hour Fluid Needs: 1 ml/kcal or per MD        Nutrition Diagnosis   Nutrition Diagnosis:  Malnutrition Diagnosis  Patient has Malnutrition Diagnosis: No    Nutrition Diagnosis  Patient has Nutrition Diagnosis: Yes  Diagnosis Status (1): New  Nutrition Diagnosis 1: Increased nutrient needs  Related to (1): increased demand for nutrients  As Evidenced by (1): wound healing       Nutrition Interventions/Recommendations   Nutrition Interventions and Recommendations:  Nutrition Prescription: Nutrition prescription for oral nutrition    Nutrition Recommendations:  Individualized Nutrition Prescription Provided for : 2063 kcals and 99g protein to be provided via  diet    Nutrition Interventions/Goals:   Interventions: Meals and snacks  Meals and Snacks: Carbohydrate-modified diet  Goal: pt may benefit form CCD 75g diet  Collaboration and Referral of Nutrition Care: Collaboration by nutrition professional with other nutrition professionals  Goal: may qualify for [employer] - pt declined referal at this time d/t not have reliable transportation    Education Documentation  No documentation found.           Nutrition Monitoring and Evaluation   Monitoring/Evaluation:   Food/Nutrient Related History Monitoring  Monitoring and Evaluation Plan: Estimated Energy Intake  Estimated Energy Intake: Energy intake greater or equal to 75% of estimated energy needs    Goal Status: New goal(s) identified       Time Spent (min): 30 minutes    content/relaxed

## 2025-01-28 NOTE — CONSULTS
Wound Care Consult     Visit Date: 1/29/2025      Patient Name: Mrs. Saba Hurtado         MRN: [id number]           YOB: 1965    Consulted for wound care. A 59 y.o. female patient admitted for   Cellulitis of leg, right [L03.115]  Failure of outpatient treatment [Z78.9]  Hypertension, unspecified type [I10]   Past Medical History:   Diagnosis Date    Angina at rest (CMS-HCC)     Obstructive sleep apnea (adult) (pediatric)     Obstructive sleep apnea, adult    Personal history of other diseases of the circulatory system     History of hypertension    Personal history of other diseases of the circulatory system     History of angina    Personal history of other diseases of the circulatory system     History of hypertension    Personal history of other diseases of the nervous system and sense organs     History of obstructive sleep apnea    Personal history of other diseases of the respiratory system     History of asthma    Personal history of other specified conditions     History of chest pain    Unspecified asthma with (acute) exacerbation ([facility])     Asthma with acute exacerbation, unspecified asthma severity, unspecified whether persistent      Past Surgical History:   Procedure Laterality Date    OTHER SURGICAL HISTORY  12/28/2021    Knee surgery    OTHER SURGICAL HISTORY  12/28/2021    Appendectomy    OTHER SURGICAL HISTORY  12/28/2021    Hysterectomy    OTHER SURGICAL HISTORY  12/28/2021    Tonsillectomy    OTHER SURGICAL HISTORY  12/28/2021    Hysterectomy    OTHER SURGICAL HISTORY  12/28/2021    Knee surgery    OTHER SURGICAL HISTORY  12/28/2021    Breast surgery    OTHER SURGICAL HISTORY  12/28/2021    Appendectomy    OTHER SURGICAL HISTORY  12/28/2021    Tonsillectomy      Scheduled medications  calcium carbonate, 500 mg, oral, Daily  enoxaparin, 40 mg, subcutaneous, q12h JESUS  [START ON 1/30/2025] estradiol, 1 patch, transdermal, Once per day on Monday Thursday  fluticasone, 2  spray, Each Nostril, Daily  ibuprofen, 800 mg, oral, TID  meropenem, 1 g, intravenous, q8h  polyethylene glycol, 17 g, oral, Daily  vancomycin, 1,750 mg, intravenous, q12h      Continuous medications     PRN medications  PRN medications: acetaminophen **OR** acetaminophen **OR** acetaminophen, albuterol, ALPRAZolam, guaiFENesin, ondansetron ODT **OR** ondansetron, sodium chloride, vancomycin     Allergies   Allergen Reactions    Clarithromycin Anaphylaxis and Unknown    Diazepam Anaphylaxis    Erythromycin Anaphylaxis    Iodinated Contrast Media Anaphylaxis    Lorazepam Anaphylaxis and Unknown    Meperidine Anaphylaxis    Morphine Anaphylaxis    Norvasc [Amlodipine] Other and Cardiac arrhythmia/arrest    Oxycodone Anaphylaxis and Unknown    Pertussis Vaccines Anaphylaxis    Prednisone Anaphylaxis and Unknown    Tramadol Anaphylaxis    Unna-Flex Elastic Unna Boot [Zinc Oxide] Rash     Caused skin ulcers to form    Clindamycin Nausea/vomiting    Adhesive Tape-Silicones Rash    Amoxicillin Unknown    Aspirin Unknown    Dicyclomine Unknown    Diphenoxylate-Atropine Unknown    Doxycycline Unknown    Hydrocodone Unknown    Hydrocodone-Acetaminophen Unknown    Propoxyphene-Acetaminophen Unknown    Pse-Hydrocodone-Pot Guaiaco Unknown        Susceptibility data from last 90 days.  Collected Specimen Info Organism Amikacin Amoxicillin/Clavulanate Ampicillin Ampicillin/Sulbactam Aztreonam Cefazolin Cefepime Cefotaxime Ceftazidime Ceftriaxone Cefuroxime (oral) Ciprofloxacin Clindamycin   01/14/25 Tissue/Biopsy from Wound/Tissue Methicillin Susceptible Staphylococcus aureus (MSSA)              S     Pseudomonas aeruginosa  S     I   R   I    R      Mixed Gram-Positive and Gram-Negative Bacteria                11/27/24 Tissue/Biopsy from Wound/Tissue Mixed Gram-Positive and Gram-Negative Bacteria                11/13/24 Tissue/Biopsy from Wound/Tissue Escherichia coli  R  I  R  S  R  R  R  R  R  R  R  R      Mixed Anaerobic  Bacteria                  Collected Specimen Info Organism Ertapenem Erythromycin Gentamicin Imipenem Levofloxacin Meropenem Oxacillin Piperacillin/Tazobactam Tetracycline Tobramycin Trimethoprim/Sulfamethoxazole Vancomycin   01/14/25 Tissue/Biopsy from Wound/Tissue Methicillin Susceptible Staphylococcus aureus (MSSA)   S      S   S   S  S     Pseudomonas aeruginosa      R  S   S   R       Mixed Gram-Positive and Gram-Negative Bacteria               11/27/24 Tissue/Biopsy from Wound/Tissue Mixed Gram-Positive and Gram-Negative Bacteria               11/13/24 Tissue/Biopsy from Wound/Tissue Escherichia coli  S   R  I  R  S   S   R  R      Mixed Anaerobic Bacteria                      Pertinent Labs:   Albumin   Date Value Ref Range Status   01/27/2025 4.1 3.4 - 5.0 g/dL Final       Wound Assessment:  Wound 08/23/24 Pretibial Right (Active)   Wound Image    01/28/25 1128   Wound Length (cm) 12 cm 01/28/25 1128   Wound Depth (cm) 0.1 cm 01/28/25 1128   Drainage Amount Large 01/28/25 1128   Dressing Petroleum gauze;ABD;Kerlix/rolled gauze 01/28/25 1128   Dressing Changed Changed 01/28/25 1128       Reason for Consult: consulted for wound evaluation and care        Wound History: long history of wound and cellulitis, healed then open again, sees primary care provider for wound care and has long history of allergies to products pre patient    Wound Team Assessment: patient sitting on bedside, agreeable to exam and independently repositioned to supine to right sidling in bed. Right foot calf dressing remove. Bilateral heels are calloused and not red. Right lower calf has 12 cm long circumferential excoriated area with large amount of serous drainage. Leg was washed with soap/water, rinsed, patted dry, culture previously obtained, area is excoriated, yellow, maceration noted. Periwound is reddened and leg is painful to touch. Patient is very specific that only petroleum gauze over wound, ABD and kerlix with no tape to  secure for dressing. Claims to  have reactions to any other options proposed to manage dressing. Absorbent pad was not permitted on foot of bed, insisted on folding a blanket to absorb any drainage that seeped through dressing. Patient stood for sacral exam and no pressure injuries were identified during 4 eyes assessment.      Recommendations: Patient preferred dressing shared with ALVINO Lombardi CNP via secure EPIC chat.      Patient is on a [facility] bed frame with [employer] Mattress. Mary Cherry RN bedside nurse updated, continue pressure injury prevention interventions and nursing to continue to follow provider orders. Re consult wound RN PRN.    Meche NORMANN RN [facility] O[facility]  1/29/2025  7:16 AM

## 2025-01-29 LAB
ANION GAP SERPL CALCULATED.3IONS-SCNC: 12 MMOL/L (ref 10–20)
BUN SERPL-MCNC: 21 MG/DL (ref 6–23)
CALCIUM SERPL-MCNC: 8.6 MG/DL (ref 8.6–10.3)
CHLORIDE SERPL-SCNC: 108 MMOL/L (ref 98–107)
CO2 SERPL-SCNC: 21 MMOL/L (ref 21–32)
CREAT SERPL-MCNC: 0.61 MG/DL (ref 0.5–1.05)
EGFRCR SERPLBLD CKD-EPI 2021: >90 ML/MIN/1.73M*2
ERYTHROCYTE [DISTWIDTH] IN BLOOD BY AUTOMATED COUNT: 13.1 % (ref 11.5–14.5)
GLUCOSE SERPL-MCNC: 121 MG/DL (ref 74–99)
HCT VFR BLD AUTO: 42.1 % (ref 36–46)
HGB BLD-MCNC: 13.4 G/DL (ref 12–16)
MCH RBC QN AUTO: 28.5 PG (ref 26–34)
MCHC RBC AUTO-ENTMCNC: 31.8 G/DL (ref 32–36)
MCV RBC AUTO: 89 FL (ref 80–100)
NRBC BLD-RTO: 0 /100 WBCS (ref 0–0)
PLATELET # BLD AUTO: 250 X10*3/UL (ref 150–450)
POTASSIUM SERPL-SCNC: 4.1 MMOL/L (ref 3.5–5.3)
RBC # BLD AUTO: 4.71 X10*6/UL (ref 4–5.2)
SODIUM SERPL-SCNC: 137 MMOL/L (ref 136–145)
VANCOMYCIN SERPL-MCNC: 11.9 UG/ML (ref 5–20)
WBC # BLD AUTO: 8.3 X10*3/UL (ref 4.4–11.3)

## 2025-01-29 PROCEDURE — 80202 ASSAY OF VANCOMYCIN: CPT

## 2025-01-29 PROCEDURE — 36415 COLL VENOUS BLD VENIPUNCTURE: CPT | Performed by: INTERNAL MEDICINE

## 2025-01-29 PROCEDURE — 1100000001 HC PRIVATE ROOM DAILY

## 2025-01-29 PROCEDURE — 2500000001 HC RX 250 WO HCPCS SELF ADMINISTERED DRUGS (ALT 637 FOR MEDICARE OP): Performed by: REGISTERED NURSE

## 2025-01-29 PROCEDURE — 80048 BASIC METABOLIC PNL TOTAL CA: CPT | Performed by: INTERNAL MEDICINE

## 2025-01-29 PROCEDURE — 85027 COMPLETE CBC AUTOMATED: CPT | Performed by: INTERNAL MEDICINE

## 2025-01-29 PROCEDURE — 2500000004 HC RX 250 GENERAL PHARMACY W/ HCPCS (ALT 636 FOR OP/ED): Performed by: NURSE PRACTITIONER

## 2025-01-29 PROCEDURE — 2500000001 HC RX 250 WO HCPCS SELF ADMINISTERED DRUGS (ALT 637 FOR MEDICARE OP)

## 2025-01-29 PROCEDURE — 2500000004 HC RX 250 GENERAL PHARMACY W/ HCPCS (ALT 636 FOR OP/ED)

## 2025-01-29 RX ADMIN — IBUPROFEN 800 MG: 800 TABLET, FILM COATED ORAL at 13:46

## 2025-01-29 RX ADMIN — MEROPENEM AND SODIUM CHLORIDE 1 G: 1 INJECTION, SOLUTION INTRAVENOUS at 15:53

## 2025-01-29 RX ADMIN — ALPRAZOLAM 1 MG: 0.5 TABLET ORAL at 21:18

## 2025-01-29 RX ADMIN — VANCOMYCIN 1750 MG: 1.25 INJECTION, SOLUTION INTRAVENOUS at 05:02

## 2025-01-29 RX ADMIN — MEROPENEM AND SODIUM CHLORIDE 1 G: 1 INJECTION, SOLUTION INTRAVENOUS at 23:41

## 2025-01-29 RX ADMIN — IBUPROFEN 800 MG: 800 TABLET, FILM COATED ORAL at 21:18

## 2025-01-29 RX ADMIN — ALPRAZOLAM 1 MG: 0.5 TABLET ORAL at 11:27

## 2025-01-29 RX ADMIN — IBUPROFEN 800 MG: 800 TABLET, FILM COATED ORAL at 05:27

## 2025-01-29 RX ADMIN — MEROPENEM AND SODIUM CHLORIDE 1 G: 1 INJECTION, SOLUTION INTRAVENOUS at 07:56

## 2025-01-29 ASSESSMENT — COGNITIVE AND FUNCTIONAL STATUS - GENERAL
MOBILITY SCORE: 24
MOBILITY SCORE: 24
DAILY ACTIVITIY SCORE: 24
DAILY ACTIVITIY SCORE: 24

## 2025-01-29 ASSESSMENT — ACTIVITIES OF DAILY LIVING (ADL): EFFECT OF PAIN ON DAILY ACTIVITIES: ADL'S

## 2025-01-29 ASSESSMENT — PAIN - FUNCTIONAL ASSESSMENT
PAIN_FUNCTIONAL_ASSESSMENT: 0-10

## 2025-01-29 ASSESSMENT — PAIN SCALES - GENERAL
PAINLEVEL_OUTOF10: 4
PAINLEVEL_OUTOF10: 6
PAINLEVEL_OUTOF10: 2
PAINLEVEL_OUTOF10: 3
PAINLEVEL_OUTOF10: 6

## 2025-01-29 ASSESSMENT — PAIN DESCRIPTION - LOCATION: LOCATION: LEG

## 2025-01-29 ASSESSMENT — ENCOUNTER SYMPTOMS: WOUND: 1

## 2025-01-29 ASSESSMENT — PAIN DESCRIPTION - DESCRIPTORS: DESCRIPTORS: DISCOMFORT;ACHING

## 2025-01-29 ASSESSMENT — PAIN DESCRIPTION - ORIENTATION: ORIENTATION: RIGHT

## 2025-01-29 NOTE — PROGRESS NOTES
01/29/25 1221   Discharge Planning   Living Arrangements Spouse/significant other   Support Systems Spouse/significant other   Type of Residence Private residence   Who is requesting discharge planning? Provider   Home or Post Acute Services None   Expected Discharge Disposition Home     Per Notes: Right lower extremity pseudomonal function  --- infectious disease following, continue antibiotics per their recommendations.    ---Continue with wound care.    ---Waiting on ID to give final recommendations regarding antibiotics.    ---Patient is clinically stable, blood cultures negative x 1 day.    PLAN: Discharge to home with no needs unless otherwise determined.

## 2025-01-29 NOTE — PROGRESS NOTES
Mrs. Saba Hurtado is a 59 y.o. female on day 2 of admission presenting with Cellulitis of leg, right.    Subjective   Interval History:   Patient seen and examined   present  Mild right leg pain  Complains of left axillary swelling  Afebrile, no chills  No chest pain or shortness of breath  No nausea vomiting or diarrhea        Review of Systems   Cardiovascular:  Positive for leg swelling.   Skin:  Positive for rash and wound.   All other systems reviewed and are negative.      Objective   Range of Vitals (last 24 hours)  Heart Rate:  [66-75]   Temp:  [36.5 °C (97.7 °F)-36.8 °C (98.2 °F)]   Resp:  [18-20]   BP: (152-160)/(74-90)   SpO2:  [97 %-100 %]   Daily Weight  01/27/25 : 139 kg (306 lb 3.5 oz)    Body mass index is 46.56 kg/m².    Physical Exam  HENT:      Head: Normocephalic and atraumatic.      Nose: Nose normal.   Eyes:      General: No scleral icterus.     Extraocular Movements: Extraocular movements intact.      Conjunctiva/sclera: Conjunctivae normal.   Cardiovascular:      Rate and Rhythm: Normal rate and regular rhythm.      Heart sounds: Normal heart sounds.   Pulmonary:      Effort: Pulmonary effort is normal.      Breath sounds: Normal breath sounds.   Abdominal:      General: Bowel sounds are normal.      Palpations: Abdomen is soft.      Tenderness: There is no abdominal tenderness.   Musculoskeletal:      Cervical back: Normal range of motion and neck supple.      Right lower leg: Edema present.      Left lower leg: Edema present.   Lymphadenopathy:      Upper Body:      Left upper body: Axillary adenopathy present.   Skin:     General: Skin is warm and dry.      Comments: Right leg erythema, right anterior lower leg ulcer with large amount of fat necrosis   Neurological:      Mental Status: She is alert and oriented to person, place, and time.   Psychiatric:         Behavior: Behavior normal.           Antibiotics  meropenem - 1 gram/50 mL  vancomycin - 1.75 gram/350  mL    Relevant Results  Labs  Results from last 72 hours   Lab Units 01/29/25  0443 01/28/25  0545 01/27/25  1316   WBC AUTO x10*3/uL 8.3 10.0 10.7   HEMOGLOBIN g/dL 13.4 14.1 13.6   HEMATOCRIT % 42.1 43.6 42.1   PLATELETS AUTO x10*3/uL 250 268 286   NEUTROS PCT AUTO %  --   --  59.9   LYMPHS PCT AUTO %  --   --  27.8   MONOS PCT AUTO %  --   --  7.5   EOS PCT AUTO %  --   --  3.4     Results from last 72 hours   Lab Units 01/29/25  0443 01/28/25  0545 01/27/25  1316   SODIUM mmol/L 137 137 137   POTASSIUM mmol/L 4.1 3.9 4.2   CHLORIDE mmol/L 108* 106 104   CO2 mmol/L 21 23 26   BUN mg/dL 21 20 21   CREATININE mg/dL 0.61 0.74 0.72   GLUCOSE mg/dL 121* 147* 114*   CALCIUM mg/dL 8.6 8.8 9.3   ANION GAP mmol/L 12 12 11   EGFR mL/min/1.73m*2 >90 >90 >90     Results from last 72 hours   Lab Units 01/27/25  1316   ALK PHOS U/L 78   BILIRUBIN TOTAL mg/dL 0.5   PROTEIN TOTAL g/dL 7.3   ALT U/L 12   AST U/L 12   ALBUMIN g/dL 4.1     Estimated Creatinine Clearance: 125 mL/min (by C-G formula based on SCr of 0.61 mg/dL).  C-Reactive Protein   Date Value Ref Range Status   01/27/2025 0.74 <1.00 mg/dL Final   08/22/2024 1.40 0.00 - 2.00 mg/dL Final     Microbiology  Susceptibility data from last 90 days.  Collected Specimen Info Organism Amikacin Amoxicillin/Clavulanate Ampicillin Ampicillin/Sulbactam Aztreonam Cefazolin Cefepime Cefotaxime Ceftazidime Ceftriaxone Cefuroxime (oral) Ciprofloxacin Clindamycin   01/14/25 Tissue/Biopsy from Wound/Tissue Methicillin Susceptible Staphylococcus aureus (MSSA)              S     Pseudomonas aeruginosa  S     I   R   I    R      Mixed Gram-Positive and Gram-Negative Bacteria                11/27/24 Tissue/Biopsy from Wound/Tissue Mixed Gram-Positive and Gram-Negative Bacteria                11/13/24 Tissue/Biopsy from Wound/Tissue Escherichia coli  R  I  R  S  R  R  R  R  R  R  R  R      Mixed Anaerobic Bacteria                  Collected Specimen Info Organism Ertapenem Erythromycin  Gentamicin Imipenem Levofloxacin Meropenem Oxacillin Piperacillin/Tazobactam Tetracycline Tobramycin Trimethoprim/Sulfamethoxazole Vancomycin   01/14/25 Tissue/Biopsy from Wound/Tissue Methicillin Susceptible Staphylococcus aureus (MSSA)   S      S   S   S  S     Pseudomonas aeruginosa      R  S   S   R       Mixed Gram-Positive and Gram-Negative Bacteria               11/27/24 Tissue/Biopsy from Wound/Tissue Mixed Gram-Positive and Gram-Negative Bacteria               11/13/24 Tissue/Biopsy from Wound/Tissue Escherichia coli  S   R  I  R  S   S   R  R      Mixed Anaerobic Bacteria                   Imaging  No results found.    Assessment/Plan   Right leg cellulitis-recent wound culture grew Pseudomonas-resistant to cefepime, Levaquin-susceptible to zosyn, merrem and MSSA  Multiple antibiotic intolerances/allergies-has tolerated cefepime, merrem  History of pseudomonas, MSSA         Continue IV meropenem   Continue IV vancomycin-for now, pending wound culture result  Monitor temperature and WBC  Follow-up wound culture  Follow blood culture  Monitor temperature and WBC  Local care  Right leg elevation  Long-term plan is IV meropenem for total of 14 days-patient unable to do antibiotics at home  Closest facility to patient is [facility]-discussed option of meropenem 2 g at 8 AM and 4 PM with patient, since she will not be able to do it every 8 hours on every 12 hours  Will recommend patient agrees to the plan prior to placement of PICC line-patient prefers placement in left arm since she sleeps on her right arm  Recommend general surgery consult for left axillary adenopathy    Randy Tanner MD

## 2025-01-29 NOTE — PROGRESS NOTES
Vancomycin Dosing by Pharmacy- FOLLOW UP    Saba Hurtado is a 59 y.o. year old female who Pharmacy has been consulted for vancomycin dosing for cellulitis, skin and soft tissue. Based on the patient's indication and renal status this patient is being dosed based on a goal AUC of 400-600.     Renal function is currently stable.    Current vancomycin dose: 1750 mg given every 12 hours    Estimated vancomycin AUC on current dose: 447 mg/L.hr     Visit Vitals  /74 (BP Location: Right arm, Patient Position: Lying)   Pulse 68   Temp 36.8 °C (98.2 °F) (Temporal)   Resp 18        Lab Results   Component Value Date    CREATININE 0.61 2025    CREATININE 0.74 2025    CREATININE 0.72 2025    CREATININE 0.71 2024        Patient weight is as follows:   Vitals:    25 1248   Weight: 139 kg (306 lb 3.5 oz)       Cultures:  No results found for the encounter in last 14 days.       I/O last 3 completed shifts:  In: 550 (4 mL/kg) [IV Piggyback:550]  Out: - (0 mL/kg)   Weight: 138.9 kg   I/O during current shift:  No intake/output data recorded.    Temp (24hrs), Av.7 °C (98.1 °F), Min:36.5 °C (97.7 °F), Max:36.8 °C (98.2 °F)      Assessment/Plan    Within goal AUC range. Continue current vancomycin regimen.    This dosing regimen is predicted by [employer] to result in the following pharmacokinetic parameters:  Loading dose: N/A  Regimen: 1750 mg IV every 12 hours.  Start time: 17:02 on 2025  Exposure target: AUC24 (range)400-600 mg/L.hr   [id number]: 447 mg/L.hr  AUC24,ss: 447 mg/L.hr  Probability of AUC24 > 400: 71 %  Ctrough,ss: 9.6 mg/L  Probability of Ctrough,ss > 20: 0 %      The next level will be obtained on 2/3 at 0500. May be obtained sooner if clinically indicated.   Will continue to monitor renal function daily while on vancomycin and order serum creatinine at least every 48 hours if not already ordered.  Follow for continued vancomycin needs, clinical response, and  signs/symptoms of toxicity.       Lynne Platt, PharmD

## 2025-01-29 NOTE — CARE PLAN
The patient's goals for the shift include      The clinical goals for the shift include iv antibiotics

## 2025-01-29 NOTE — CARE PLAN
The patient's goals for the shift include improve infection    The clinical goals for the shift include iv antibiotics

## 2025-01-29 NOTE — PROGRESS NOTES
"Saba Yang \"Mrs. Saba Yang\" is a 59 y.o. female on day 2 of admission presenting with Cellulitis of leg, right.      Subjective   No significant complaints today.  Wants to know what the logistics really be regarding discharge.       Objective     Last Recorded Vitals  /74 (BP Location: Right arm, Patient Position: Lying)   Pulse 68   Temp 36.8 °C (98.2 °F) (Temporal)   Resp 18   Wt 139 kg (306 lb 3.5 oz)   SpO2 100%   Intake/Output last 3 Shifts:    Intake/Output Summary (Last 24 hours) at 1/29/2025 0817  Last data filed at 1/28/2025 1755  Gross per 24 hour   Intake 550 ml   Output --   Net 550 ml       Admission Weight  Weight: 139 kg (306 lb 3.5 oz) (01/27/25 1248)    Daily Weight  01/27/25 : 139 kg (306 lb 3.5 oz)    Image Results  Vascular US lower extremity venous insufficiency bilateral            [place] [address]   Tel [phone] and Fax [phone]       [facility]  [facility]       Patient Name:      SABA YANG  Reading Physician: [id number] Keely Fitch MD  Study Date:        12/4/2024          Ordering           [id number] CAMILLA GERARDO                                        Physician:  MRN/PID:           [id number]           Technologist:      Sarah Winters RVT  Accession#:        [id number]       Technologist 2:  Date of Birth/Age: 1965 / 59      Encounter#:        [id number]                     years  Gender:            F  Admission Status:  Outpatient         Location           [facility]                                        Performed:       Diagnosis/ICD:    Venous insufficiency (chronic) (peripheral)-I87.2; Cellulitus                    of right lower limb-L03.115  CPT Codes:        87198 Venous reflux study VV VI complete  Patient Position: Study performed in a reverse Trendelenburg " position.       CONCLUSIONS:  Right Lower Venous Insufficiency: There is deep venous reflux noted in the distal external iliac vein measuring 2.46 seconds. There is deep venous reflux noted in the common femoral vein measuring 1.03 seconds. There is deep venous reflux noted in the right mid femoral vein measuring 1.33 seconds.  Left Lower Venous Insufficiency: Left leg demonstrates no evidence of deep vein thrombosis or deep or superficial venous insufficiency.  noted in left mid calf however, appears negative for reflux.  Right Lower Venous: No evidence of acute deep vein thrombus visualized in the right lower extremity. Additional Findings; Lymph nodes noted in right groin measuring 3.79 cm x 1.93 cm. Bilateral calf vessels visualized in segments due to pateint body habitus. Cannot rule out thrombus in non-visualized segments.  Left Lower Venous: No evidence of acute deep vein thrombus visualized in the left lower extremity. Bilateral calf vessels visualized in segments due to pateint body habitus. Cannot rule out thrombus in non-visualized segments.     Additional Findings:  Technically difficult study due to patient body habitus and positioning.       Imaging & Doppler Findings:     Right          Compress Thrombus  SFJ              Yes      None  Prox Thigh GSV   Yes      None  Mid Thigh GSV    Yes      None  Knee GSV         Yes      None  Prox Calf GSV    Yes      None  Mid Calf GSV     Yes      None  Dist Calf GSV    Yes      None  SPJ              Yes      None  SSV Prox         Yes      None  SSV Mid          Yes      None  SSV Distal       Yes      None       Left           Compress Thrombus  SFJ              Yes      None  Prox Thigh GSV   Yes      None  Mid Thigh GSV    Yes      None  Knee GSV         Yes      None  Prox Calf GSV    Yes      None  Mid Calf GSV     Yes      None  Dist Calf GSV    Yes      None  SPJ              Yes      None  SSV Prox         Yes      None  SSV Mid          Yes       None  SSV Distal       Yes      None       Right                 Compressible Thrombus        Flow  Distal External Iliac                None         Reflux  CFV                       Yes        None         Reflux  PFV                       Yes        None  FV Proximal               Yes        None  FV Mid                    Yes        None         Reflux  FV Distal                 Yes        None  Popliteal                 Yes        None   Spontaneous/Phasic  Peroneal                  Yes        None  PTV                       Yes        None       Left                  Compress Thrombus        Flow  Distal External Iliac            None  CFV                     Yes      None   Spontaneous/Phasic  PFV                     Yes      None  FV Proximal             Yes      None   Spontaneous/Phasic  FV Mid                  Yes      None  FV Distal               Yes      None  Popliteal               Yes      None   Spontaneous/Phasic  Peroneal                Yes      None  PTV                     Yes      None       [id number] Keely Fitch MD  Electronically signed by [id number] Keely Fitch MD on 12/5/2024 at 6:02:27 PM       ** Final **      Physical Exam  Generally no acute distress  HEENT PERRL EOMI  Cardiovascular S1-S2 regular rate rhythm  Lungs clear to auscultation  Abdomen bowel sounds present nontender  Extremities right lower extremity with wound dressings applied this morning    Relevant Results  Scheduled medications  calcium carbonate, 500 mg, oral, Daily  enoxaparin, 40 mg, subcutaneous, q12h JESUS  [START ON 1/30/2025] estradiol, 1 patch, transdermal, Once per day on Monday Thursday  fluticasone, 2 spray, Each Nostril, Daily  ibuprofen, 800 mg, oral, TID  meropenem, 1 g, intravenous, q8h  polyethylene glycol, 17 g, oral, Daily  vancomycin, 1,750 mg, intravenous, q12h      Continuous medications     PRN medications  PRN medications: acetaminophen **OR** acetaminophen **OR** acetaminophen, albuterol,  ALPRAZolam, guaiFENesin, ondansetron ODT **OR** ondansetron, sodium chloride, vancomycin               Assessment/Plan      Impression: 59-year-old woman admitted with recurrent right lower extremity infection, now with Pseudomonas.      Plan:    1.  Right lower extremity pseudomonal function  -Appreciate infectious disease input, continue antibiotics per their recommendations.  Continue with wound care.  Waiting on ID to give final recommendations regarding antibiotics.  Patient is clinically stable, blood cultures negative x 1 day.                    Chepe Escobedo MD

## 2025-01-30 ENCOUNTER — APPOINTMENT (OUTPATIENT)
Dept: CARDIOLOGY | Facility: HOSPITAL | Age: 60
End: 2025-01-30
Payer: COMMERCIAL

## 2025-01-30 ENCOUNTER — HOME HEALTH ADMISSION (OUTPATIENT)
Dept: HOME HEALTH SERVICES | Facility: HOME HEALTH | Age: 60
End: 2025-01-30
Payer: COMMERCIAL

## 2025-01-30 DIAGNOSIS — L03.115 CELLULITIS OF RIGHT LOWER EXTREMITY: ICD-10-CM

## 2025-01-30 DIAGNOSIS — A49.8 PSEUDOMONAS AERUGINOSA INFECTION: ICD-10-CM

## 2025-01-30 DIAGNOSIS — S81.801D OPEN WOUND OF RIGHT LOWER EXTREMITY WITH COMPLICATION, SUBSEQUENT ENCOUNTER: ICD-10-CM

## 2025-01-30 LAB
ANION GAP SERPL CALCULATED.3IONS-SCNC: 9 MMOL/L (ref 10–20)
BUN SERPL-MCNC: 20 MG/DL (ref 6–23)
CALCIUM SERPL-MCNC: 8.4 MG/DL (ref 8.6–10.3)
CHLORIDE SERPL-SCNC: 110 MMOL/L (ref 98–107)
CO2 SERPL-SCNC: 22 MMOL/L (ref 21–32)
CREAT SERPL-MCNC: 0.62 MG/DL (ref 0.5–1.05)
EGFRCR SERPLBLD CKD-EPI 2021: >90 ML/MIN/1.73M*2
ERYTHROCYTE [DISTWIDTH] IN BLOOD BY AUTOMATED COUNT: 13.2 % (ref 11.5–14.5)
GLUCOSE SERPL-MCNC: 111 MG/DL (ref 74–99)
HCT VFR BLD AUTO: 45.9 % (ref 36–46)
HGB BLD-MCNC: 14.3 G/DL (ref 12–16)
MCH RBC QN AUTO: 28.4 PG (ref 26–34)
MCHC RBC AUTO-ENTMCNC: 31.2 G/DL (ref 32–36)
MCV RBC AUTO: 91 FL (ref 80–100)
NRBC BLD-RTO: 0 /100 WBCS (ref 0–0)
PLATELET # BLD AUTO: 257 X10*3/UL (ref 150–450)
POTASSIUM SERPL-SCNC: 4 MMOL/L (ref 3.5–5.3)
RBC # BLD AUTO: 5.03 X10*6/UL (ref 4–5.2)
SODIUM SERPL-SCNC: 137 MMOL/L (ref 136–145)
WBC # BLD AUTO: 8.9 X10*3/UL (ref 4.4–11.3)

## 2025-01-30 PROCEDURE — 2500000001 HC RX 250 WO HCPCS SELF ADMINISTERED DRUGS (ALT 637 FOR MEDICARE OP): Performed by: REGISTERED NURSE

## 2025-01-30 PROCEDURE — 36573 INSJ PICC RS&I 5 YR+: CPT

## 2025-01-30 PROCEDURE — 2780000003 HC OR 278 NO HCPCS

## 2025-01-30 PROCEDURE — C1751 CATH, INF, PER/CENT/MIDLINE: HCPCS

## 2025-01-30 PROCEDURE — 2500000004 HC RX 250 GENERAL PHARMACY W/ HCPCS (ALT 636 FOR OP/ED): Performed by: NURSE PRACTITIONER

## 2025-01-30 PROCEDURE — 87081 CULTURE SCREEN ONLY: CPT | Mod: TRILAB | Performed by: INTERNAL MEDICINE

## 2025-01-30 PROCEDURE — 99232 SBSQ HOSP IP/OBS MODERATE 35: CPT | Performed by: INTERNAL MEDICINE

## 2025-01-30 PROCEDURE — 1100000001 HC PRIVATE ROOM DAILY

## 2025-01-30 PROCEDURE — 80048 BASIC METABOLIC PNL TOTAL CA: CPT | Performed by: INTERNAL MEDICINE

## 2025-01-30 PROCEDURE — 85027 COMPLETE CBC AUTOMATED: CPT | Performed by: INTERNAL MEDICINE

## 2025-01-30 PROCEDURE — 2500000001 HC RX 250 WO HCPCS SELF ADMINISTERED DRUGS (ALT 637 FOR MEDICARE OP)

## 2025-01-30 PROCEDURE — 36415 COLL VENOUS BLD VENIPUNCTURE: CPT | Performed by: INTERNAL MEDICINE

## 2025-01-30 PROCEDURE — 02HV33Z INSERTION OF INFUSION DEVICE INTO SUPERIOR VENA CAVA, PERCUTANEOUS APPROACH: ICD-10-PCS | Performed by: NURSE PRACTITIONER

## 2025-01-30 PROCEDURE — 2500000004 HC RX 250 GENERAL PHARMACY W/ HCPCS (ALT 636 FOR OP/ED)

## 2025-01-30 RX ORDER — LIDOCAINE HYDROCHLORIDE 10 MG/ML
5 INJECTION, SOLUTION INFILTRATION; PERINEURAL ONCE
Status: DISCONTINUED | OUTPATIENT
Start: 2025-01-30 | End: 2025-02-01 | Stop reason: HOSPADM

## 2025-01-30 RX ORDER — MEROPENEM 1 G/1
1 INJECTION, POWDER, FOR SOLUTION INTRAVENOUS EVERY 8 HOURS
Qty: 30 G | Refills: 0 | Status: SHIPPED
Start: 2025-01-30 | End: 2025-02-12

## 2025-01-30 RX ADMIN — ALPRAZOLAM 1 MG: 0.5 TABLET ORAL at 20:52

## 2025-01-30 RX ADMIN — IBUPROFEN 800 MG: 800 TABLET, FILM COATED ORAL at 06:37

## 2025-01-30 RX ADMIN — ALPRAZOLAM 1 MG: 0.5 TABLET ORAL at 09:44

## 2025-01-30 RX ADMIN — VANCOMYCIN 1750 MG: 1.25 INJECTION, SOLUTION INTRAVENOUS at 20:28

## 2025-01-30 RX ADMIN — MEROPENEM AND SODIUM CHLORIDE 1 G: 1 INJECTION, SOLUTION INTRAVENOUS at 09:43

## 2025-01-30 RX ADMIN — IBUPROFEN 800 MG: 800 TABLET, FILM COATED ORAL at 22:48

## 2025-01-30 RX ADMIN — MEROPENEM AND SODIUM CHLORIDE 1 G: 1 INJECTION, SOLUTION INTRAVENOUS at 15:20

## 2025-01-30 RX ADMIN — FLUTICASONE PROPIONATE 2 SPRAY: 50 SPRAY, METERED NASAL at 09:46

## 2025-01-30 RX ADMIN — ESTRADIOL 1 PATCH: 0.1 PATCH TRANSDERMAL at 09:46

## 2025-01-30 RX ADMIN — VANCOMYCIN 1750 MG: 1.25 INJECTION, SOLUTION INTRAVENOUS at 05:57

## 2025-01-30 RX ADMIN — IBUPROFEN 800 MG: 800 TABLET, FILM COATED ORAL at 15:20

## 2025-01-30 RX ADMIN — MEROPENEM AND SODIUM CHLORIDE 1 G: 1 INJECTION, SOLUTION INTRAVENOUS at 23:22

## 2025-01-30 ASSESSMENT — PAIN SCALES - GENERAL
PAINLEVEL_OUTOF10: 6
PAINLEVEL_OUTOF10: 0 - NO PAIN
PAINLEVEL_OUTOF10: 7

## 2025-01-30 ASSESSMENT — PAIN DESCRIPTION - LOCATION: LOCATION: LEG

## 2025-01-30 ASSESSMENT — PAIN DESCRIPTION - ORIENTATION: ORIENTATION: RIGHT

## 2025-01-30 ASSESSMENT — PAIN - FUNCTIONAL ASSESSMENT: PAIN_FUNCTIONAL_ASSESSMENT: 0-10

## 2025-01-30 NOTE — CARE PLAN
Problem: Pain - Adult  Goal: Verbalizes/displays adequate comfort level or baseline comfort level  Outcome: Progressing     Problem: Safety - Adult  Goal: Free from fall injury  Outcome: Progressing     Problem: Discharge Planning  Goal: Discharge to home or other facility with appropriate resources  Outcome: Progressing     Problem: Chronic Conditions and Co-morbidities  Goal: Patient's chronic conditions and co-morbidity symptoms are monitored and maintained or improved  Outcome: Progressing     Problem: Nutrition  Goal: Nutrient intake appropriate for maintaining nutritional needs  Outcome: Progressing     Problem: Fall/Injury  Goal: Not fall by end of shift  Outcome: Progressing  Goal: Be free from injury by end of the shift  Outcome: Progressing  Goal: Verbalize understanding of personal risk factors for fall in the hospital  Outcome: Progressing  Goal: Verbalize understanding of risk factor reduction measures to prevent injury from fall in the home  Outcome: Progressing  Goal: Use assistive devices by end of the shift  Outcome: Progressing  Goal: Pace activities to prevent fatigue by end of the shift  Outcome: Progressing     Problem: Pain  Goal: Takes deep breaths with improved pain control throughout the shift  Outcome: Progressing  Goal: Turns in bed with improved pain control throughout the shift  Outcome: Progressing  Goal: Walks with improved pain control throughout the shift  Outcome: Progressing  Goal: Performs ADL's with improved pain control throughout shift  Outcome: Progressing  Goal: Participates in PT with improved pain control throughout the shift  Outcome: Progressing  Goal: Free from opioid side effects throughout the shift  Outcome: Progressing  Goal: Free from acute confusion related to pain meds throughout the shift  Outcome: Progressing   The patient's goals for the shift include improve infection    The clinical goals for the shift include picc placement, dc planning    Over the shift,  the patient did not make progress toward the following goals. Barriers to progression include na. Recommendations to address these barriers include na.

## 2025-01-30 NOTE — PROGRESS NOTES
01/30/25 0943   Discharge Planning   Expected Discharge Disposition Home H   Does the patient need discharge transport arranged? Yes   RoundTrip coordination needed? Yes   Has discharge transport been arranged? No     MSW met with patient and spouse at bedside and discussed discharge planning. Outpatient infusions is not feasible due to transportation. Discussed HH and SNF options. Patient and spouse prefer and are agreeable to home care services. Provided freedom of choice and patient prefers to use  Home Care and pharmacy as she utilizes  providers. Questions answered at this time. Patient does have questions regarding PICC placement and any limitations due to PICC; was deferred to whomever will be placing the line.     Medical team updated to plan as above. Patient needs internal home care and infusion order placed along with needing line for access at home. Care Transitions will coordinate  Home Care start of care pending receipt of internal order.    12:21 PM  Home care order placed. [facility] notified of the same and requested they advise regarding antibiotic delivery and start of care.     2:18 PM   Home Care is processing patient's referral and will advise regarding antibiotics and start of care.    3:37 PM  Notified by [facility] that patient's insurance requires prior authorization for meropenem. Medical team aware and requested they complete prior authorization request. Care Transitions will follow.

## 2025-01-30 NOTE — PROGRESS NOTES
"Saba Yang \"Mrs. Saba Yang\" is a 59 y.o. female on day 3 of admission presenting with Cellulitis of leg, right.      Subjective   Discussed plan for discharge.  Patient okay with PICC line.  Okay with home care.  Axillary lymphadenopathy is chronic problem that has been followed by outpatient physician.       Objective     Last Recorded Vitals  /74 (BP Location: Right arm, Patient Position: Lying)   Pulse 70   Temp 36.7 °C (98.1 °F) (Temporal)   Resp 18   Wt 139 kg (306 lb 3.5 oz)   SpO2 96%   Intake/Output last 3 Shifts:    Intake/Output Summary (Last 24 hours) at 1/30/2025 1328  Last data filed at 1/30/2025 1001  Gross per 24 hour   Intake 240 ml   Output --   Net 240 ml       Admission Weight  Weight: 139 kg (306 lb 3.5 oz) (01/27/25 1248)    Daily Weight  01/27/25 : 139 kg (306 lb 3.5 oz)    Image Results  Vascular US lower extremity venous insufficiency bilateral            [place] [address]   Tel [phone] and Fax [phone]       Vascular Lab Report  VASC US LOWER EXTREMITY VENOUS INSUFFICIENCY BILATERAL       Patient Name:      SABA YANG  Reading Physician: [id number] Keely Fitch MD  Study Date:        12/4/2024          Ordering           [id number] CAMILLA GERARDO                                        Physician:  MRN/PID:           [id number]           Technologist:      Sarah Winters RVT  Accession#:        [id number]       Technologist 2:  Date of Birth/Age: 1965 / 59      Encounter#:        [id number]                     years  Gender:            F  Admission Status:  Outpatient         Location           [facility]                                        Performed:       Diagnosis/ICD:    Venous insufficiency (chronic) (peripheral)-I87.2; Cellulitus                    of right lower limb-L03.115  CPT Codes:        46919 Venous reflux study VV VI " complete  Patient Position: Study performed in a reverse Trendelenburg position.       CONCLUSIONS:  Right Lower Venous Insufficiency: There is deep venous reflux noted in the distal external iliac vein measuring 2.46 seconds. There is deep venous reflux noted in the common femoral vein measuring 1.03 seconds. There is deep venous reflux noted in the right mid femoral vein measuring 1.33 seconds.  Left Lower Venous Insufficiency: Left leg demonstrates no evidence of deep vein thrombosis or deep or superficial venous insufficiency.  noted in left mid calf however, appears negative for reflux.  Right Lower Venous: No evidence of acute deep vein thrombus visualized in the right lower extremity. Additional Findings; Lymph nodes noted in right groin measuring 3.79 cm x 1.93 cm. Bilateral calf vessels visualized in segments due to pateint body habitus. Cannot rule out thrombus in non-visualized segments.  Left Lower Venous: No evidence of acute deep vein thrombus visualized in the left lower extremity. Bilateral calf vessels visualized in segments due to pateint body habitus. Cannot rule out thrombus in non-visualized segments.     Additional Findings:  Technically difficult study due to patient body habitus and positioning.       Imaging & Doppler Findings:     Right          Compress Thrombus  SFJ              Yes      None  Prox Thigh GSV   Yes      None  Mid Thigh GSV    Yes      None  Knee GSV         Yes      None  Prox Calf GSV    Yes      None  Mid Calf GSV     Yes      None  Dist Calf GSV    Yes      None  SPJ              Yes      None  SSV Prox         Yes      None  SSV Mid          Yes      None  SSV Distal       Yes      None       Left           Compress Thrombus  SFJ              Yes      None  Prox Thigh GSV   Yes      None  Mid Thigh GSV    Yes      None  Knee GSV         Yes      None  Prox Calf GSV    Yes      None  Mid Calf GSV     Yes      None  Dist Calf GSV    Yes      None  SPJ               Yes      None  SSV Prox         Yes      None  SSV Mid          Yes      None  SSV Distal       Yes      None       Right                 Compressible Thrombus        Flow  Distal External Iliac                None         Reflux  CFV                       Yes        None         Reflux  PFV                       Yes        None  FV Proximal               Yes        None  FV Mid                    Yes        None         Reflux  FV Distal                 Yes        None  Popliteal                 Yes        None   Spontaneous/Phasic  Peroneal                  Yes        None  PTV                       Yes        None       Left                  Compress Thrombus        Flow  Distal External Iliac            None  CFV                     Yes      None   Spontaneous/Phasic  PFV                     Yes      None  FV Proximal             Yes      None   Spontaneous/Phasic  FV Mid                  Yes      None  FV Distal               Yes      None  Popliteal               Yes      None   Spontaneous/Phasic  Peroneal                Yes      None  PTV                     Yes      None       [id number] Keely Fitch MD  Electronically signed by [id number] Keely Fitch MD on 12/5/2024 at 6:02:27 PM       ** Final **      Physical Exam  Generally no acute distress  HEENT PERRL EOMI  Cardiovascular S1-S2 regular rate rhythm  Lungs clear to auscultation  Abdomen bowel sounds present nontender  Extremities right lower extremity with wound dressings applied this morning    Relevant Results  Scheduled medications  calcium carbonate, 500 mg, oral, Daily  enoxaparin, 40 mg, subcutaneous, q12h JESUS  estradiol, 1 patch, transdermal, Once per day on Monday Thursday  fluticasone, 2 spray, Each Nostril, Daily  ibuprofen, 800 mg, oral, TID  lidocaine, 5 mL, infiltration, Once  meropenem, 1 g, intravenous, q8h  polyethylene glycol, 17 g, oral, Daily  vancomycin, 1,750 mg, intravenous, q12h      Continuous medications     PRN  medications  PRN medications: acetaminophen **OR** acetaminophen **OR** acetaminophen, albuterol, ALPRAZolam, alteplase, guaiFENesin, ondansetron ODT **OR** ondansetron, sodium chloride, vancomycin               Assessment/Plan      Impression: 59-year-old woman admitted with recurrent right lower extremity infection, now with Pseudomonas.      Plan:    1.  Right lower extremity pseudomonal infection  -Appreciate ID input.  Planning for home antibiotics.  Looks like meropenem.  Waiting on ID for recs to be placed.  PICC line will be placed today.  Hopeful discharge home tomorrow.  Home care orders are in.    2.  Left axillary lymphadenopathy  -Patient confirmed this is a chronic issue, can follow-up with surgery as an outpatient, follows with primary care for this as well.                    Chepe Escobedo MD

## 2025-01-30 NOTE — PROGRESS NOTES
Mrs. Saba Hurtado is a 59 y.o. female on day 3 of admission presenting with Cellulitis of leg, right.    Subjective   Interval History:   Patient seen and examined   present  Reports right leg pain swelling and drainage improving   Afebrile, no chills  No chest pain or shortness of breath  No nausea vomiting or diarrhea    Agrees to PICC line and home care with IV antibiotics         Objective   Range of Vitals (last 24 hours)  Heart Rate:  [70-91]   Temp:  [36.3 °C (97.3 °F)-36.7 °C (98.1 °F)]   Resp:  [18]   SpO2:  [96 %-97 %]   Daily Weight  01/27/25 : 139 kg (306 lb 3.5 oz)    Body mass index is 46.56 kg/m².    Physical Exam  HENT:      Head: Normocephalic and atraumatic.      Nose: Nose normal.   Eyes:      General: No scleral icterus.     Extraocular Movements: Extraocular movements intact.      Conjunctiva/sclera: Conjunctivae normal.   Cardiovascular:      Rate and Rhythm: Normal rate and regular rhythm.      Heart sounds: Normal heart sounds.   Pulmonary:      Effort: Pulmonary effort is normal.      Breath sounds: Normal breath sounds.   Abdominal:      General: Bowel sounds are normal.      Palpations: Abdomen is soft.      Tenderness: There is no abdominal tenderness.   Musculoskeletal:      Cervical back: Normal range of motion and neck supple.      Right lower leg: Edema present.      Left lower leg: Edema present.   Lymphadenopathy:      Upper Body:      Left upper body: Axillary adenopathy present.   Skin:     General: Skin is warm and dry.      Comments: Right leg erythema, right leg dressing    Neurological:      Mental Status: She is alert and oriented to person, place, and time.   Psychiatric:         Behavior: Behavior normal.           Antibiotics  meropenem - 1 gram/50 mL  vancomycin - 1.75 gram/350 mL    Relevant Results  Labs  Results from last 72 hours   Lab Units 01/30/25  0457 01/29/25  0443 01/28/25  0545   WBC AUTO x10*3/uL 8.9 8.3 10.0   HEMOGLOBIN g/dL 14.3 13.4  14.1   HEMATOCRIT % 45.9 42.1 43.6   PLATELETS AUTO x10*3/uL 257 250 268     Results from last 72 hours   Lab Units 01/30/25  0457 01/29/25  0443 01/28/25  0545   SODIUM mmol/L 137 137 137   POTASSIUM mmol/L 4.0 4.1 3.9   CHLORIDE mmol/L 110* 108* 106   CO2 mmol/L 22 21 23   BUN mg/dL 20 21 20   CREATININE mg/dL 0.62 0.61 0.74   GLUCOSE mg/dL 111* 121* 147*   CALCIUM mg/dL 8.4* 8.6 8.8   ANION GAP mmol/L 9* 12 12   EGFR mL/min/1.73m*2 >90 >90 >90           Estimated Creatinine Clearance: 125 mL/min (by C-G formula based on SCr of 0.62 mg/dL).  C-Reactive Protein   Date Value Ref Range Status   01/27/2025 0.74 <1.00 mg/dL Final   08/22/2024 1.40 0.00 - 2.00 mg/dL Final     Microbiology  Susceptibility data from last 90 days.  Collected Specimen Info Organism Amikacin Amoxicillin/Clavulanate Ampicillin Ampicillin/Sulbactam Aztreonam Cefazolin Cefepime Cefotaxime Ceftazidime Ceftriaxone Cefuroxime (oral) Ciprofloxacin Clindamycin   01/27/25 Tissue/Biopsy from Wound/Tissue Mixed Gram-Positive and Gram-Negative Bacteria                01/14/25 Tissue/Biopsy from Wound/Tissue Methicillin Susceptible Staphylococcus aureus (MSSA)              S     Pseudomonas aeruginosa  S     I   R   I    R      Mixed Gram-Positive and Gram-Negative Bacteria                11/27/24 Tissue/Biopsy from Wound/Tissue Mixed Gram-Positive and Gram-Negative Bacteria                11/13/24 Tissue/Biopsy from Wound/Tissue Escherichia coli  R  I  R  S  R  R  R  R  R  R  R  R      Mixed Anaerobic Bacteria                  Collected Specimen Info Organism Ertapenem Erythromycin Gentamicin Imipenem Levofloxacin Meropenem Oxacillin Piperacillin/Tazobactam Tetracycline Tobramycin Trimethoprim/Sulfamethoxazole Vancomycin   01/27/25 Tissue/Biopsy from Wound/Tissue Mixed Gram-Positive and Gram-Negative Bacteria               01/14/25 Tissue/Biopsy from Wound/Tissue Methicillin Susceptible Staphylococcus aureus (MSSA)   S      S   S   S  S     Pseudomonas  aeruginosa      R  S   S   R       Mixed Gram-Positive and Gram-Negative Bacteria               11/27/24 Tissue/Biopsy from Wound/Tissue Mixed Gram-Positive and Gram-Negative Bacteria               11/13/24 Tissue/Biopsy from Wound/Tissue Escherichia coli  S   R  I  R  S   S   R  R      Mixed Anaerobic Bacteria                   Imaging  No results found.    Assessment/Plan   Right leg cellulitis-recent wound culture grew Pseudomonas-resistant to cefepime, Levaquin-susceptible to zosyn, merrem and MSSA  Multiple antibiotic intolerances/allergies-has tolerated cefepime, merrem  History of pseudomonas, MSSA         Continue IV meropenem   Continue IV vancomycin-for now, pending wound culture result  Monitor temperature and WBC  Follow-up wound culture  Follow blood culture  Monitor temperature and WBC  Local care  Right leg elevation    Long-term plan is IV meropenem 1 gram every 8 hours for total of 14 days till 2/12/2025  Weekly CBC with diff, CMP-see discharge rec  PICC line placement  Home with  home care     patient agrees to the plan ok to place PICC line-patient prefers placement in left arm since she sleeps on her right arm  Recommend general surgery consult for left axillary adenopathy    Total time spent caring for the patient today was 30 minutes. This includes time spent before the visit reviewing the chart, time spent during the visit, and time spent after the visit on documentation.     DEVANG Walton-CNP

## 2025-01-31 ENCOUNTER — DOCUMENTATION (OUTPATIENT)
Dept: HOME HEALTH SERVICES | Facility: HOME HEALTH | Age: 60
End: 2025-01-31
Payer: COMMERCIAL

## 2025-01-31 DIAGNOSIS — L03.115 CELLULITIS OF RIGHT LOWER EXTREMITY: ICD-10-CM

## 2025-01-31 DIAGNOSIS — S81.801D OPEN WOUND OF RIGHT LOWER EXTREMITY WITH COMPLICATION, SUBSEQUENT ENCOUNTER: ICD-10-CM

## 2025-01-31 DIAGNOSIS — A49.8 PSEUDOMONAS AERUGINOSA INFECTION: ICD-10-CM

## 2025-01-31 LAB
BACTERIA BLD CULT: NORMAL
BACTERIA BLD CULT: NORMAL
BACTERIA SPEC CULT: ABNORMAL
GRAM STN SPEC: ABNORMAL
GRAM STN SPEC: ABNORMAL

## 2025-01-31 PROCEDURE — 1100000001 HC PRIVATE ROOM DAILY

## 2025-01-31 PROCEDURE — 2500000004 HC RX 250 GENERAL PHARMACY W/ HCPCS (ALT 636 FOR OP/ED)

## 2025-01-31 PROCEDURE — 2500000001 HC RX 250 WO HCPCS SELF ADMINISTERED DRUGS (ALT 637 FOR MEDICARE OP)

## 2025-01-31 PROCEDURE — 99232 SBSQ HOSP IP/OBS MODERATE 35: CPT | Performed by: INTERNAL MEDICINE

## 2025-01-31 PROCEDURE — 2500000004 HC RX 250 GENERAL PHARMACY W/ HCPCS (ALT 636 FOR OP/ED): Performed by: NURSE PRACTITIONER

## 2025-01-31 PROCEDURE — 2500000001 HC RX 250 WO HCPCS SELF ADMINISTERED DRUGS (ALT 637 FOR MEDICARE OP): Performed by: REGISTERED NURSE

## 2025-01-31 RX ADMIN — MEROPENEM AND SODIUM CHLORIDE 1 G: 1 INJECTION, SOLUTION INTRAVENOUS at 09:01

## 2025-01-31 RX ADMIN — IBUPROFEN 800 MG: 800 TABLET, FILM COATED ORAL at 21:08

## 2025-01-31 RX ADMIN — VANCOMYCIN 1750 MG: 1.25 INJECTION, SOLUTION INTRAVENOUS at 10:24

## 2025-01-31 RX ADMIN — IBUPROFEN 800 MG: 800 TABLET, FILM COATED ORAL at 13:58

## 2025-01-31 RX ADMIN — VANCOMYCIN 1750 MG: 1.25 INJECTION, SOLUTION INTRAVENOUS at 22:57

## 2025-01-31 RX ADMIN — ALPRAZOLAM 1 MG: 0.5 TABLET ORAL at 21:08

## 2025-01-31 RX ADMIN — ALPRAZOLAM 1 MG: 0.5 TABLET ORAL at 09:01

## 2025-01-31 RX ADMIN — IBUPROFEN 800 MG: 800 TABLET, FILM COATED ORAL at 05:27

## 2025-01-31 RX ADMIN — FLUTICASONE PROPIONATE 2 SPRAY: 50 SPRAY, METERED NASAL at 09:02

## 2025-01-31 RX ADMIN — MEROPENEM AND SODIUM CHLORIDE 1 G: 1 INJECTION, SOLUTION INTRAVENOUS at 17:03

## 2025-01-31 ASSESSMENT — PAIN SCALES - GENERAL
PAINLEVEL_OUTOF10: 6
PAINLEVEL_OUTOF10: 0 - NO PAIN

## 2025-01-31 ASSESSMENT — PAIN DESCRIPTION - LOCATION: LOCATION: LEG

## 2025-01-31 ASSESSMENT — PAIN - FUNCTIONAL ASSESSMENT: PAIN_FUNCTIONAL_ASSESSMENT: 0-10

## 2025-01-31 ASSESSMENT — PAIN DESCRIPTION - ORIENTATION: ORIENTATION: RIGHT

## 2025-01-31 NOTE — PROCEDURES
"Vascular Access Team Procedure Note     Visit Date: 1/30/2025      Patient Name: Saba Hurtado \"Mrs. Saba Hurtado\"         MRN: [id number]             Procedure:Education provided and consent obtained. Pt positioned for comfort and safety. Time out performed. Sterile field set up. Lidociane from kit administered subcutaneously prior to venipuncture. 4 fr single lumen PICC placed to L basilic vein without difficulty. Placement in cavo atrial junction verified via [employer]. Line flushes easily with brisk blood return. Line stabilized with statlock and secure port IV adhesive. CHG dressing and curos cap applied. Pt tolerated well. Bed in low position, siderails up x3. Call bell and belongings within reach. 's documentation placed in chart. Med Tele RN aware PICC is OK to use.                          Tala Chaney RN  1/30/2025  7:21 PM                            "

## 2025-01-31 NOTE — CARE PLAN
Problem: Pain - Adult  Goal: Verbalizes/displays adequate comfort level or baseline comfort level  Outcome: Progressing     Problem: Safety - Adult  Goal: Free from fall injury  Outcome: Progressing     Problem: Discharge Planning  Goal: Discharge to home or other facility with appropriate resources  Outcome: Progressing     Problem: Chronic Conditions and Co-morbidities  Goal: Patient's chronic conditions and co-morbidity symptoms are monitored and maintained or improved  Outcome: Progressing     Problem: Nutrition  Goal: Nutrient intake appropriate for maintaining nutritional needs  Outcome: Progressing     Problem: Fall/Injury  Goal: Not fall by end of shift  Outcome: Progressing  Goal: Be free from injury by end of the shift  Outcome: Progressing  Goal: Verbalize understanding of personal risk factors for fall in the hospital  Outcome: Progressing  Goal: Verbalize understanding of risk factor reduction measures to prevent injury from fall in the home  Outcome: Progressing  Goal: Use assistive devices by end of the shift  Outcome: Progressing  Goal: Pace activities to prevent fatigue by end of the shift  Outcome: Progressing     Problem: Pain  Goal: Takes deep breaths with improved pain control throughout the shift  Outcome: Progressing  Goal: Turns in bed with improved pain control throughout the shift  Outcome: Progressing  Goal: Walks with improved pain control throughout the shift  Outcome: Progressing  Goal: Performs ADL's with improved pain control throughout shift  Outcome: Progressing  Goal: Participates in PT with improved pain control throughout the shift  Outcome: Progressing  Goal: Free from opioid side effects throughout the shift  Outcome: Progressing  Goal: Free from acute confusion related to pain meds throughout the shift  Outcome: Progressing   The patient's goals for the shift include improve infection    The clinical goals for the shift include discharge planning    Over the shift, the  patient did not make progress toward the following goals. Barriers to progression include na. Recommendations to address these barriers include na.

## 2025-01-31 NOTE — HH CARE COORDINATION
Home Care received a Referral for Infusion and Nursing. We have processed the referral for a Start of Care on 02-02-25.     If you have any questions or concerns, please feel free to contact us at [phone]. Follow the prompts, enter your five digit zip code, and you will be directed to your care team on [facility] 1.

## 2025-01-31 NOTE — PROGRESS NOTES
01/31/25 0845   Discharge Planning   Expected Discharge Disposition Home H   Does the patient need discharge transport arranged? Yes   RoundTrip coordination needed? Yes   Has discharge transport been arranged? No     Plan is for patient to go home with  Home Care upon discharge with IV antibiotics. However, antibiotics require prior authorization. Medical team is aware of the same.     12:03 PM   Home Care notified that prior authorization was complete. They will advise.     1:42 PM   Home Care advised that they can provide start of care on Sunday, 2/2/25 for patient's 9:00 AM dose. Provided updated.    2:22 PM  Confirmed with ID that plan is for patient to discharge after 4:00 PM dose tomorrow, Saturday, 2/1 with start of care on Sunday, 2/2 at 9:00 AM. Medical team and  Home Care aware of the same. Patient also updated. She requests that we arrange transportation home for her and her  for tomorrow.     2:43 PM [facility] ambulette arranged for pick-up at 5:00 PM tomorrow, 2/1. Spouse is allowed to ride up front for the transportation home. Nurse updated.

## 2025-01-31 NOTE — PROGRESS NOTES
"Saba Hurtado \"Mrs. Saba Hurtado\" is a 59 y.o. female on day 4 of admission presenting with Cellulitis of leg, right.      Subjective   Discussed plan for discharge.  No new complaints.       Objective     Last Recorded Vitals  /76 (BP Location: Right arm)   Pulse 74   Temp 36.5 °C (97.7 °F) (Temporal)   Resp 20   Wt 139 kg (306 lb 3.5 oz)   SpO2 97%   Intake/Output last 3 Shifts:    Intake/Output Summary (Last 24 hours) at 1/31/2025 1637  Last data filed at 1/31/2025 1617  Gross per 24 hour   Intake 1010 ml   Output --   Net 1010 ml       Admission Weight  Weight: 139 kg (306 lb 3.5 oz) (01/27/25 1248)    Daily Weight  01/27/25 : 139 kg (306 lb 3.5 oz)    Image Results  Bedside PICC Imaging  These images are not reportable by radiology and will not be interpreted   by  Radiologists.      Physical Exam  Generally no acute distress  HEENT PERRL EOMI  Cardiovascular S1-S2 regular rate rhythm  Lungs clear to auscultation  Abdomen bowel sounds present nontender  Extremities right lower extremity with wound dressings applied this morning    Relevant Results  Scheduled medications  calcium carbonate, 500 mg, oral, Daily  enoxaparin, 40 mg, subcutaneous, q12h JESUS  estradiol, 1 patch, transdermal, Once per day on Monday Thursday  fluticasone, 2 spray, Each Nostril, Daily  ibuprofen, 800 mg, oral, TID  lidocaine, 5 mL, infiltration, Once  meropenem, 1 g, intravenous, q8h  polyethylene glycol, 17 g, oral, Daily  vancomycin, 1,750 mg, intravenous, q12h      Continuous medications     PRN medications  PRN medications: acetaminophen **OR** acetaminophen **OR** acetaminophen, albuterol, ALPRAZolam, alteplase, guaiFENesin, ondansetron ODT **OR** ondansetron, sodium chloride, vancomycin               Assessment/Plan      Impression: 59-year-old woman admitted with recurrent right lower extremity infection, now with Pseudomonas.      Plan:    1.  Right lower extremity pseudomonal infection  -Patient is set up " for home care for IV antibiotics but  home care cannot get supplies together until Sunday.  Therefore patient will need to remain in hospital until then.  Patient had successful PICC placement with no issues.  Continue with antibiotics per ID.    2.  Left axillary lymphadenopathy  -Patient confirmed this is a chronic issue, can follow-up with surgery as an outpatient, follows with primary care for this as well.                    Chepe Escobedo MD

## 2025-01-31 NOTE — CARE PLAN
The patient's goals for the shift include improve infection    The clinical goals for the shift include IV atb, pain control      Problem: Pain - Adult  Goal: Verbalizes/displays adequate comfort level or baseline comfort level  Outcome: Progressing     Problem: Safety - Adult  Goal: Free from fall injury  Outcome: Progressing     Problem: Chronic Conditions and Co-morbidities  Goal: Patient's chronic conditions and co-morbidity symptoms are monitored and maintained or improved  Outcome: Progressing     Problem: Nutrition  Goal: Nutrient intake appropriate for maintaining nutritional needs  Outcome: Progressing     Problem: Fall/Injury  Goal: Not fall by end of shift  Outcome: Progressing  Goal: Be free from injury by end of the shift  Outcome: Progressing  Goal: Verbalize understanding of personal risk factors for fall in the hospital  Outcome: Progressing  Goal: Verbalize understanding of risk factor reduction measures to prevent injury from fall in the home  Outcome: Progressing  Goal: Use assistive devices by end of the shift  Outcome: Progressing  Goal: Pace activities to prevent fatigue by end of the shift  Outcome: Progressing     Problem: Pain  Goal: Takes deep breaths with improved pain control throughout the shift  Outcome: Progressing  Goal: Turns in bed with improved pain control throughout the shift  Outcome: Progressing  Goal: Walks with improved pain control throughout the shift  Outcome: Progressing  Goal: Performs ADL's with improved pain control throughout shift  Outcome: Progressing  Goal: Participates in PT with improved pain control throughout the shift  Outcome: Progressing  Goal: Free from opioid side effects throughout the shift  Outcome: Progressing  Goal: Free from acute confusion related to pain meds throughout the shift  Outcome: Progressing

## 2025-01-31 NOTE — NURSING NOTE
Vascular access note    Patient with Lt arm single lumen picc, dressing D&I, infusing without difficulty. Patient very anxious with many questions about picc and line care, questions answered and support given.

## 2025-02-01 ENCOUNTER — HOME INFUSION (OUTPATIENT)
Dept: INFUSION THERAPY | Age: 60
End: 2025-02-01
Payer: COMMERCIAL

## 2025-02-01 VITALS
DIASTOLIC BLOOD PRESSURE: 96 MMHG | SYSTOLIC BLOOD PRESSURE: 152 MMHG | RESPIRATION RATE: 20 BRPM | TEMPERATURE: 98.6 F | HEART RATE: 77 BPM | WEIGHT: 293 LBS | OXYGEN SATURATION: 100 % | HEIGHT: 68 IN | BODY MASS INDEX: 44.41 KG/M2

## 2025-02-01 LAB — STAPHYLOCOCCUS SPEC CULT: NORMAL

## 2025-02-01 PROCEDURE — 2500000001 HC RX 250 WO HCPCS SELF ADMINISTERED DRUGS (ALT 637 FOR MEDICARE OP)

## 2025-02-01 PROCEDURE — 2500000004 HC RX 250 GENERAL PHARMACY W/ HCPCS (ALT 636 FOR OP/ED): Performed by: NURSE PRACTITIONER

## 2025-02-01 PROCEDURE — 2500000001 HC RX 250 WO HCPCS SELF ADMINISTERED DRUGS (ALT 637 FOR MEDICARE OP): Performed by: REGISTERED NURSE

## 2025-02-01 PROCEDURE — 99239 HOSP IP/OBS DSCHRG MGMT >30: CPT | Performed by: INTERNAL MEDICINE

## 2025-02-01 PROCEDURE — 2500000004 HC RX 250 GENERAL PHARMACY W/ HCPCS (ALT 636 FOR OP/ED)

## 2025-02-01 RX ORDER — IBUPROFEN 800 MG/1
800 TABLET ORAL EVERY 8 HOURS PRN
Start: 2025-02-01

## 2025-02-01 RX ADMIN — FLUTICASONE PROPIONATE 2 SPRAY: 50 SPRAY, METERED NASAL at 10:01

## 2025-02-01 RX ADMIN — MEROPENEM AND SODIUM CHLORIDE 1 G: 1 INJECTION, SOLUTION INTRAVENOUS at 12:47

## 2025-02-01 RX ADMIN — VANCOMYCIN 1750 MG: 1.25 INJECTION, SOLUTION INTRAVENOUS at 12:49

## 2025-02-01 RX ADMIN — ALPRAZOLAM 1 MG: 0.5 TABLET ORAL at 10:09

## 2025-02-01 RX ADMIN — MEROPENEM AND SODIUM CHLORIDE 1 G: 1 INJECTION, SOLUTION INTRAVENOUS at 01:03

## 2025-02-01 RX ADMIN — IBUPROFEN 800 MG: 800 TABLET, FILM COATED ORAL at 13:38

## 2025-02-01 RX ADMIN — IBUPROFEN 800 MG: 800 TABLET, FILM COATED ORAL at 06:16

## 2025-02-01 ASSESSMENT — COGNITIVE AND FUNCTIONAL STATUS - GENERAL
DAILY ACTIVITIY SCORE: 24
DAILY ACTIVITIY SCORE: 24
MOBILITY SCORE: 24
MOBILITY SCORE: 24
DAILY ACTIVITIY SCORE: 24
MOBILITY SCORE: 24

## 2025-02-01 ASSESSMENT — PAIN SCALES - GENERAL
PAINLEVEL_OUTOF10: 2
PAINLEVEL_OUTOF10: 0 - NO PAIN

## 2025-02-01 NOTE — PROGRESS NOTES
REVIEWED PT INFO AS CORRECT.   DX...  CELLULITIS L LEG  REVIEWED ALLERGIES... 24 ALLERGIES LISTED IN CHART  PMH...GERD, HLD, MORBID OBESITY, HTN, DM, DEG. DISC DISEASE, HYPO T. ANXIETY  NO MEDICATION INTERACTIONS.  REVIEWED RELEVANT BASELINE VALUES  LAB ARE ... CBC/D, CMP WEEKLY TO DR. LONDONO  PT HAS …..  SL PICC ...  FLUSH PER [facility] PROTOCOL.  CONTINUE MED THRU TENTATIVE STOP DATE …. 2/12/25  MED PLACED … ECLIPSE  CARE PLAN DONE TODAY    PATIENT IS A59YO FEMALE BEING DISCHARGED TO HOME ON IV MERREM THROUGH 2/12 FOR LEG CELLULITIS.  PMH LISTED ABOVE AND SHE HAS MULTIPLE ALLERGIES (24) LISTED IN CHART.  SHE HAS BEEN TOLERATING THE MERREM W/O INCIDENT IN HOUSE.  SHE WILL BE FOLLOWED AT HOME BY DR. BURNETT.  SPOKE WITH PATIENT AT LENGTH YESTERDAY, SHE WOULD LIKE HER INFUSIONS VIA ECLIPSE, SHE AND HER  FEEL THIS WILL WORK BEST FOR THEM.  THOUGH SHE WANTS US TO CALL PRIOR TO NEXT FILL TO MAKE SURE THEY DON'T WANT TO SWITCH TO GRAVITY.  REVIEW OF HOME GOING MEDS AND NO INTERACTIONS OR DUPLICATIONS NOTED.  GOAL OF THERAPY IS TO RESOLVE INFECTION WHILE MONITORING FOR SIDE EFFECTS AS LISTED IN THE CP.  PATIENT WOULD LIKE DELIVERY SATURDAY AROUND 5:45PM WITH  CALLING WHEN ON WAY.  PROCESSED FILL FOR 12 MERREM FOR DOS 2/2 THROUGH 2/5.  NF FOR 2/4 FOR OVN DEL. AND RPH CHECK WITH PATIENT ON PROGRESS. DSL

## 2025-02-01 NOTE — CARE PLAN
The patient's goals for the shift include improve infection    The clinical goals for the shift include comfort      Problem: Pain - Adult  Goal: Verbalizes/displays adequate comfort level or baseline comfort level  Outcome: Progressing     Problem: Safety - Adult  Goal: Free from fall injury  Outcome: Progressing     Problem: Discharge Planning  Goal: Discharge to home or other facility with appropriate resources  Outcome: Met     Problem: Chronic Conditions and Co-morbidities  Goal: Patient's chronic conditions and co-morbidity symptoms are monitored and maintained or improved  Outcome: Progressing     Problem: Nutrition  Goal: Nutrient intake appropriate for maintaining nutritional needs  Outcome: Progressing     Problem: Fall/Injury  Goal: Not fall by end of shift  Outcome: Progressing  Goal: Be free from injury by end of the shift  Outcome: Progressing  Goal: Verbalize understanding of personal risk factors for fall in the hospital  Outcome: Progressing  Goal: Verbalize understanding of risk factor reduction measures to prevent injury from fall in the home  Outcome: Progressing  Goal: Use assistive devices by end of the shift  Outcome: Progressing  Goal: Pace activities to prevent fatigue by end of the shift  Outcome: Progressing     Problem: Pain  Goal: Takes deep breaths with improved pain control throughout the shift  Outcome: Progressing  Goal: Turns in bed with improved pain control throughout the shift  Outcome: Progressing  Goal: Walks with improved pain control throughout the shift  Outcome: Progressing  Goal: Performs ADL's with improved pain control throughout shift  Outcome: Progressing  Goal: Participates in PT with improved pain control throughout the shift  Outcome: Progressing  Goal: Free from opioid side effects throughout the shift  Outcome: Progressing  Goal: Free from acute confusion related to pain meds throughout the shift  Outcome: Progressing

## 2025-02-01 NOTE — NURSING NOTE
Assumed care of liu pt. Request manual bp's, awaiting reading. Significant other at bedside. Reported to me patient and S.O. perform dressing changes per choice.

## 2025-02-01 NOTE — DISCHARGE SUMMARY
"Discharge Diagnosis  Cellulitis of leg, right    Issues Requiring Follow-Up  Infectious disease    Discharge Meds     Medication List      START taking these medications     meropenem 1 gram injection; Commonly known as: Merrem; Infuse 1 g into a   venous catheter every 8 hours for 13 days.     CHANGE how you take these medications     ibuprofen 800 mg tablet; Take 1 tablet (800 mg) by mouth every 8 hours   if needed for mild pain (1 - 3).; What changed: when to take this, reasons   to take this     CONTINUE taking these medications     blood pressure test kit-wrist kit; OMRON 7 Series wrist cuff - tests BP   as needed   Coban 4 X 5 \"-yard bandage; Generic drug: elastic bandage; Apply 1 Units   topically once daily.   Flonase Allergy Relief 50 mcg/actuation nasal spray; Generic drug:   fluticasone; Administer 2 sprays into each nostril once daily.   furosemide 20 mg tablet; Commonly known as: Lasix; TAKE 1 TABLET(20 MG)   BY MOUTH DAILY AS NEEDED FOR SWELLING   Kerlix 4 1/2 X 147 \" bandage; Generic drug: gauze bandage; Apply 1 Units   topically once daily.   * lancets 30 gauge misc   * lancets 30 gauge misc; Commonly known as: OneTouch Delica Plus Lancet;   1 Units by in vitro route 2 times a day.   OneTouch Ultra Control solution; Generic drug: blood glucose control,   normal; 1 Units by in vitro route if needed (control). AS DIRECTED IN   VITRO DAILY AS NEEDEDAS DIRECTED IN VITRO DAILY AS NEEDED   OneTouch Ultra2 Meter misc; Generic drug: blood-glucose meter; 1 Units   by in vitro route once daily.   Saline NasaL 0.65 % nasal spray; Generic drug: sodium chloride; USE 2   SPRAYS IN EACH NOSTRIL AS NEEDED DAILY   Vivelle-Dot 0.1 mg/24 hr patch; Generic drug: estradiol; Place 1 patch   on the skin 2 times a week.  * This list has 2 medication(s) that are the same as other medications   prescribed for you. Read the directions carefully, and ask your doctor or   other care provider to review them with you.     STOP " taking these medications     albuterol 90 mcg/actuation inhaler   levothyroxine 50 mcg tablet; Commonly known as: Synthroid, Levoxyl   ProAir HFA 90 mcg/actuation inhaler; Generic drug: albuterol     ASK your doctor about these medications     alteplase 2 mg injection; Commonly known as: Cathflo Activase; 2 mL (2   mg) by intra-catheter route 1 time for 1 dose.; Ask about: Should I take   this medication?       Test Results Pending At Discharge  Pending Labs       Order Current Status    Extra Urine Gray Tube Collected (01/27/25 1325)    Staphylococcus Aureus/MRSA Colonization, Culture - PICC Only In process    Urinalysis with Reflex Culture and Microscopic In process            Hospital Course   Patient admitted to hospital with lower extremity cellulitis and ulcerations that are positive for Pseudomonas.  This has been somewhat of an ongoing problem for her and she was called about a positive culture for Pseudomonas but was unable to get to her infectious disease appointment so she presented to outside hospital ED.  Transferred to our hospital for further management.  Infectious disease was consulted.  Decision was made for meropenem IV.  PICC line was placed.  Patient will be getting IV infusions at home.  Home health care can start infusion care tomorrow, Sunday, 2/2/2025.  The patient was cleared for discharge by infectious disease for plan as above.  Infectious disease will continue to follow with the patient.    Pertinent Physical Exam At Time of Discharge  Physical Exam  Generally no acute distress  H EENT PERRL EOMI  Cardiovascular S1-S2 regular rate rhythm  Lungs clear to auscultation  Abdomen bowel sounds present nontender  Extremities right lower extremity in wrap  Outpatient Follow-Up  Future Appointments   Date Time Provider Department Center   2/2/2025 To Be Determined Renetta Amaya RN [facility]   3/5/2025  2:00 PM Constantine Gomez PA-C [id number]     Total time spent on discharge 40  minutes    Chepe Escobedo MD

## 2025-02-01 NOTE — CARE PLAN
The patient's goals for the shift include improve infection    The clinical goals for the shift include comfort

## 2025-02-02 ENCOUNTER — HOME CARE VISIT (OUTPATIENT)
Dept: HOME HEALTH SERVICES | Facility: HOME HEALTH | Age: 60
End: 2025-02-02
Payer: COMMERCIAL

## 2025-02-02 VITALS
SYSTOLIC BLOOD PRESSURE: 148 MMHG | RESPIRATION RATE: 20 BRPM | DIASTOLIC BLOOD PRESSURE: 88 MMHG | TEMPERATURE: 98.5 F | HEART RATE: 72 BPM | HEIGHT: 68 IN | BODY MASS INDEX: 44.41 KG/M2 | WEIGHT: 293 LBS | OXYGEN SATURATION: 97 %

## 2025-02-02 PROCEDURE — 169592 NO-PAY CLAIM PROCEDURE

## 2025-02-02 PROCEDURE — G0299 HHS/HOSPICE OF RN EA 15 MIN: HCPCS | Mod: HHH

## 2025-02-03 ENCOUNTER — PATIENT OUTREACH (OUTPATIENT)
Dept: PRIMARY CARE | Facility: CLINIC | Age: 60
End: 2025-02-03
Payer: COMMERCIAL

## 2025-02-03 NOTE — PROGRESS NOTES
Discharge Facility: [facility]     Discharge Diagnosis:    Cellulitis of leg, right     Issues Requiring Follow-Up  Infectious disease    Admission Date: 1/28/2025   Discharge Date:  2/1/2025     PCP Appointment Date  Tasked to office     Specialist Appointment Date:     I.D     CBC and Auto Differential Once a week  Comprehensive Metabolic Panel Once a week  Referral to Home Care Canceled  Referral to Home Health Pending Review  Venous Access, Home Infusion    Hospital Encounter and Summary Linked: Yes    ED to Hosp-Admission (Discharged) with Chepe Escobedo MD (01/27/2025)     See discharge assessment below for further details     Engagement  Call Start Time: 1112 (2/3/2025 11:12 AM)    Medications  Medications reviewed with patient/caregiver?: Yes (2/3/2025 11:12 AM)  Is the patient having any side effects they believe may be caused by any medication additions or changes?: No (2/3/2025 11:12 AM)  Does the patient have all medications ordered at discharge?: Yes (2/3/2025 11:12 AM)  Care Management Interventions: No intervention needed (2/3/2025 11:12 AM)  Prescription Comments: START taking these medications     meropenem 1 gram injection; Commonly known as: Merrem; Infuse 1 g into a   venous catheter every 8 hours for 13 days. (2/3/2025 11:12 AM)  Is the patient taking all medications as directed (includes completed medication regime)?: -- (Pt states has all meds) (2/3/2025 11:12 AM)  Care Management Interventions: Provided patient education (2/3/2025 11:12 AM)  Medication Comments: STOP taking these medications     albuterol 90 mcg/actuation inhaler   levothyroxine 50 mcg tablet; Commonly known as: Synthroid, Levoxyl   ProAir HFA 90 mcg/actuation inhaler; Generic drug: albuterol (2/3/2025 11:12 AM)    Appointments  Does the patient have a primary care provider?: Yes (2/3/2025 11:12 AM)  Care Management Interventions: Educated patient on importance of making appointment (2/3/2025 11:12 AM)    Self  Management  What is the home health agency?: HHC active (2/3/2025 11:12 AM)  What Durable Medical Equipment (DME) was ordered?: NA (2/3/2025 11:12 AM)    Patient Teaching  Does the patient have access to their discharge instructions?: Yes (2/3/2025 11:12 AM)  Care Management Interventions: Reviewed instructions with patient (2/3/2025 11:12 AM)  What is the patient's perception of their health status since discharge?: Improving (2/3/2025 11:12 AM)  Is the patient/caregiver able to teach back the hierarchy of who to call/visit for symptoms/problems? PCP, Specialist, Home Health nurse, Urgent Care, ED, 911: Yes (2/3/2025 11:12 AM)  Patient/Caregiver Education Comments: Patient states [facility] has been in home, no questions on medications, aware to call providers for any questions concerns or chnage in condition. I sent message to PCP office regarding F/U as current F/U is over 2 weeks, patient rides [place]. Patient has spouse for support. (2/3/2025 11:12 AM)

## 2025-02-04 ENCOUNTER — HOME INFUSION (OUTPATIENT)
Dept: INFUSION THERAPY | Age: 60
End: 2025-02-04
Payer: COMMERCIAL

## 2025-02-04 DIAGNOSIS — R05.9 COUGH, UNSPECIFIED TYPE: ICD-10-CM

## 2025-02-05 RX ORDER — VITAMIN A 3000 MCG
CAPSULE ORAL
Qty: 44 ML | Refills: 11 | Status: SHIPPED | OUTPATIENT
Start: 2025-02-05

## 2025-02-05 RX ORDER — GUAIFENESIN 600 MG/1
600 TABLET, EXTENDED RELEASE ORAL 2 TIMES DAILY
COMMUNITY
End: 2025-02-05 | Stop reason: SDUPTHER

## 2025-02-05 RX ORDER — GUAIFENESIN 600 MG/1
600 TABLET, EXTENDED RELEASE ORAL 2 TIMES DAILY
Qty: 60 TABLET | Refills: 3 | Status: SHIPPED | OUTPATIENT
Start: 2025-02-05

## 2025-02-05 ASSESSMENT — ENCOUNTER SYMPTOMS
LOWER EXTREMITY EDEMA: 1
PAIN: 1
SKIN LESIONS: 1
PAIN LOCATION - PAIN SEVERITY: 5/10
PERSON REPORTING PAIN: PATIENT
PAIN LOCATION: RIGHT LEG
MUSCLE WEAKNESS: 1
APPETITE LEVEL: FAIR
FATIGUES EASILY: 1
CHANGE IN APPETITE: UNCHANGED

## 2025-02-05 ASSESSMENT — ACTIVITIES OF DAILY LIVING (ADL)
ENTERING_EXITING_HOME: ONE PERSON
OASIS_M1830: 05

## 2025-02-06 ENCOUNTER — HOME CARE VISIT (OUTPATIENT)
Dept: HOME HEALTH SERVICES | Facility: HOME HEALTH | Age: 60
End: 2025-02-06
Payer: COMMERCIAL

## 2025-02-06 PROCEDURE — G0299 HHS/HOSPICE OF RN EA 15 MIN: HCPCS | Mod: HHH

## 2025-02-06 RX ADMIN — Medication 10 ML: at 09:13

## 2025-02-06 ASSESSMENT — PAIN SCALES - PAIN ASSESSMENT IN ADVANCED DEMENTIA (PAINAD)
CONSOLABILITY: 0 - NO NEED TO CONSOLE.
CONSOLABILITY: 0
BODYLANGUAGE: 0 - RELAXED.
FACIALEXPRESSION: 0
BREATHING: 0
BODYLANGUAGE: 0
NEGVOCALIZATION: 0 - NONE.
FACIALEXPRESSION: 0 - SMILING OR INEXPRESSIVE.
TOTALSCORE: 0
NEGVOCALIZATION: 0

## 2025-02-06 ASSESSMENT — ENCOUNTER SYMPTOMS
PERSON REPORTING PAIN: PATIENT
LIMITED RANGE OF MOTION: 1
ARTHRALGIAS: 1
PAIN LOCATION: RIGHT LEG
APPETITE LEVEL: GOOD
STOOL FREQUENCY: LESS THAN DAILY
SKIN LESIONS: 1
SUBJECTIVE PAIN PROGRESSION: UNCHANGED
HYPERTENSION: 1
LOWER EXTREMITY EDEMA: 1
PAIN: 1

## 2025-02-06 ASSESSMENT — ACTIVITIES OF DAILY LIVING (ADL)
CURRENT_FUNCTION: STAND BY ASSIST
AMBULATION ASSISTANCE: STAND BY ASSIST

## 2025-02-07 LAB
ALBUMIN SERPL-MCNC: 4.2 G/DL (ref 3.6–5.1)
ALBUMIN/GLOB SERPL: 1.5 (CALC) (ref 1–2.5)
ALP SERPL-CCNC: 82 U/L (ref 37–153)
ALT SERPL-CCNC: 17 U/L (ref 6–29)
AST SERPL-CCNC: 16 U/L (ref 10–35)
BASOPHILS # BLD AUTO: 81 CELLS/UL (ref 0–200)
BASOPHILS NFR BLD AUTO: 0.8 %
BILIRUB SERPL-MCNC: 0.5 MG/DL (ref 0.2–1.2)
BUN SERPL-MCNC: 20 MG/DL (ref 7–25)
BUN/CREAT SERPL: 47 (CALC) (ref 6–22)
CALCIUM SERPL-MCNC: 8.9 MG/DL (ref 8.6–10.4)
CHLORIDE SERPL-SCNC: 102 MMOL/L (ref 98–110)
CO2 SERPL-SCNC: 27 MMOL/L (ref 20–32)
CREAT SERPL-MCNC: 0.43 MG/DL (ref 0.5–1.03)
EGFRCR SERPLBLD CKD-EPI 2021: 113 ML/MIN/1.73M2
EOSINOPHIL # BLD AUTO: 535 CELLS/UL (ref 15–500)
EOSINOPHIL NFR BLD AUTO: 5.3 %
ERYTHROCYTE [DISTWIDTH] IN BLOOD BY AUTOMATED COUNT: 12.8 % (ref 11–15)
GLOBULIN SER CALC-MCNC: 2.8 G/DL (CALC) (ref 1.9–3.7)
GLUCOSE SERPL-MCNC: 90 MG/DL (ref 65–99)
HCT VFR BLD AUTO: 39.9 % (ref 35–45)
HGB BLD-MCNC: 13.1 G/DL (ref 11.7–15.5)
LYMPHOCYTES # BLD AUTO: 2576 CELLS/UL (ref 850–3900)
LYMPHOCYTES NFR BLD AUTO: 25.5 %
MCH RBC QN AUTO: 28.9 PG (ref 27–33)
MCHC RBC AUTO-ENTMCNC: 32.8 G/DL (ref 32–36)
MCV RBC AUTO: 88.1 FL (ref 80–100)
MONOCYTES # BLD AUTO: 717 CELLS/UL (ref 200–950)
MONOCYTES NFR BLD AUTO: 7.1 %
NEUTROPHILS # BLD AUTO: 6191 CELLS/UL (ref 1500–7800)
NEUTROPHILS NFR BLD AUTO: 61.3 %
PLATELET # BLD AUTO: 306 THOUSAND/UL (ref 140–400)
PMV BLD REES-ECKER: 9.8 FL (ref 7.5–12.5)
POTASSIUM SERPL-SCNC: 4.2 MMOL/L (ref 3.5–5.3)
PROT SERPL-MCNC: 7 G/DL (ref 6.1–8.1)
RBC # BLD AUTO: 4.53 MILLION/UL (ref 3.8–5.1)
SODIUM SERPL-SCNC: 139 MMOL/L (ref 135–146)
WBC # BLD AUTO: 10.1 THOUSAND/UL (ref 3.8–10.8)

## 2025-02-12 ENCOUNTER — HOME INFUSION (OUTPATIENT)
Dept: INFUSION THERAPY | Age: 60
End: 2025-02-12
Payer: COMMERCIAL

## 2025-02-12 DIAGNOSIS — L03.119 CELLULITIS OF LOWER LEG: Primary | ICD-10-CM

## 2025-02-12 NOTE — PROGRESS NOTES
Reviewed chart and patient ordered meropenem 1Gm IV q8h. Received orders from Dr Tanner to stop as planned and REMOVE PICC. Patient has allergy to heparin .  Orders placed in EPIC and routed to Team    No further follow up. Discharge from [facility] at this time.

## 2025-02-13 ENCOUNTER — HOME CARE VISIT (OUTPATIENT)
Dept: HOME HEALTH SERVICES | Facility: HOME HEALTH | Age: 60
End: 2025-02-13
Payer: COMMERCIAL

## 2025-02-13 VITALS
SYSTOLIC BLOOD PRESSURE: 180 MMHG | DIASTOLIC BLOOD PRESSURE: 88 MMHG | OXYGEN SATURATION: 99 % | TEMPERATURE: 97.5 F | RESPIRATION RATE: 16 BRPM

## 2025-02-13 LAB
ALBUMIN SERPL-MCNC: 4.2 G/DL (ref 3.6–5.1)
ALP SERPL-CCNC: 78 U/L (ref 37–153)
ALT SERPL-CCNC: 14 U/L (ref 6–29)
ANION GAP SERPL CALCULATED.4IONS-SCNC: 10 MMOL/L (CALC) (ref 7–17)
AST SERPL-CCNC: 13 U/L (ref 10–35)
BASOPHILS # BLD AUTO: 84 CELLS/UL (ref 0–200)
BASOPHILS NFR BLD AUTO: 1 %
BILIRUB SERPL-MCNC: 0.7 MG/DL (ref 0.2–1.2)
BUN SERPL-MCNC: 19 MG/DL (ref 7–25)
CALCIUM SERPL-MCNC: 9.1 MG/DL (ref 8.6–10.4)
CHLORIDE SERPL-SCNC: 104 MMOL/L (ref 98–110)
CO2 SERPL-SCNC: 26 MMOL/L (ref 20–32)
CREAT SERPL-MCNC: 0.49 MG/DL (ref 0.5–1.03)
EGFRCR SERPLBLD CKD-EPI 2021: 109 ML/MIN/1.73M2
EOSINOPHIL # BLD AUTO: 386 CELLS/UL (ref 15–500)
EOSINOPHIL NFR BLD AUTO: 4.6 %
ERYTHROCYTE [DISTWIDTH] IN BLOOD BY AUTOMATED COUNT: 12.7 % (ref 11–15)
EST. AVERAGE GLUCOSE BLD GHB EST-MCNC: 134 MG/DL
EST. AVERAGE GLUCOSE BLD GHB EST-SCNC: 7.4 MMOL/L
GLUCOSE SERPL-MCNC: 96 MG/DL (ref 65–139)
HBA1C MFR BLD: 6.3 % OF TOTAL HGB
HCT VFR BLD AUTO: 42.7 % (ref 35–45)
HGB BLD-MCNC: 14.1 G/DL (ref 11.7–15.5)
LYMPHOCYTES # BLD AUTO: 2066 CELLS/UL (ref 850–3900)
LYMPHOCYTES NFR BLD AUTO: 24.6 %
MCH RBC QN AUTO: 29 PG (ref 27–33)
MCHC RBC AUTO-ENTMCNC: 33 G/DL (ref 32–36)
MCV RBC AUTO: 87.9 FL (ref 80–100)
MONOCYTES # BLD AUTO: 563 CELLS/UL (ref 200–950)
MONOCYTES NFR BLD AUTO: 6.7 %
NEUTROPHILS # BLD AUTO: 5300 CELLS/UL (ref 1500–7800)
NEUTROPHILS NFR BLD AUTO: 63.1 %
PLATELET # BLD AUTO: 311 THOUSAND/UL (ref 140–400)
PMV BLD REES-ECKER: 9.9 FL (ref 7.5–12.5)
POTASSIUM SERPL-SCNC: 4.3 MMOL/L (ref 3.5–5.3)
PROT SERPL-MCNC: 7 G/DL (ref 6.1–8.1)
RBC # BLD AUTO: 4.86 MILLION/UL (ref 3.8–5.1)
SODIUM SERPL-SCNC: 140 MMOL/L (ref 135–146)
TSH SERPL-ACNC: 2.23 MIU/L (ref 0.4–4.5)
WBC # BLD AUTO: 8.4 THOUSAND/UL (ref 3.8–10.8)

## 2025-02-13 PROCEDURE — G0299 HHS/HOSPICE OF RN EA 15 MIN: HCPCS | Mod: HHH

## 2025-02-13 ASSESSMENT — PAIN SCALES - PAIN ASSESSMENT IN ADVANCED DEMENTIA (PAINAD)
BREATHING: 0
BODYLANGUAGE: 0 - RELAXED.
FACIALEXPRESSION: 0
FACIALEXPRESSION: 0 - SMILING OR INEXPRESSIVE.
CONSOLABILITY: 0 - NO NEED TO CONSOLE.
CONSOLABILITY: 0
BODYLANGUAGE: 0

## 2025-02-13 ASSESSMENT — ENCOUNTER SYMPTOMS
PAIN LOCATION: RIGHT LEG
PAIN: 1
PERSON REPORTING PAIN: PATIENT

## 2025-02-13 ASSESSMENT — ACTIVITIES OF DAILY LIVING (ADL)
HOME_HEALTH_OASIS: 00
OASIS_M1830: 00

## 2025-02-14 ENCOUNTER — PATIENT OUTREACH (OUTPATIENT)
Dept: PRIMARY CARE | Facility: CLINIC | Age: 60
End: 2025-02-14
Payer: COMMERCIAL

## 2025-02-14 NOTE — PROGRESS NOTES
Call regarding F/U states she is doing fine., will see PCP 2/20/2025     Encouraged to call providers for any questions concerns or change in condition

## 2025-02-19 ENCOUNTER — PATIENT OUTREACH (OUTPATIENT)
Dept: PRIMARY CARE | Facility: CLINIC | Age: 60
End: 2025-02-19
Payer: COMMERCIAL

## 2025-02-20 ENCOUNTER — OFFICE VISIT (OUTPATIENT)
Dept: PRIMARY CARE | Facility: CLINIC | Age: 60
End: 2025-02-20
Payer: COMMERCIAL

## 2025-02-20 VITALS
HEART RATE: 97 BPM | HEIGHT: 68 IN | WEIGHT: 293 LBS | DIASTOLIC BLOOD PRESSURE: 66 MMHG | BODY MASS INDEX: 44.41 KG/M2 | SYSTOLIC BLOOD PRESSURE: 170 MMHG

## 2025-02-20 DIAGNOSIS — I83.003 VENOUS STASIS ULCER OF ANKLE WITH OTHER ULCER SEVERITY, UNSPECIFIED LATERALITY, UNSPECIFIED WHETHER VARICOSE VEINS PRESENT (MULTI): Primary | ICD-10-CM

## 2025-02-20 DIAGNOSIS — L97.308 VENOUS STASIS ULCER OF ANKLE WITH OTHER ULCER SEVERITY, UNSPECIFIED LATERALITY, UNSPECIFIED WHETHER VARICOSE VEINS PRESENT (MULTI): Primary | ICD-10-CM

## 2025-02-20 DIAGNOSIS — S81.801D OPEN WOUND OF RIGHT LOWER EXTREMITY WITH COMPLICATION, SUBSEQUENT ENCOUNTER: ICD-10-CM

## 2025-02-20 PROCEDURE — 1036F TOBACCO NON-USER: CPT | Performed by: PHYSICIAN ASSISTANT

## 2025-02-20 PROCEDURE — 3077F SYST BP >= 140 MM HG: CPT | Performed by: PHYSICIAN ASSISTANT

## 2025-02-20 PROCEDURE — 3078F DIAST BP <80 MM HG: CPT | Performed by: PHYSICIAN ASSISTANT

## 2025-02-20 PROCEDURE — 3008F BODY MASS INDEX DOCD: CPT | Performed by: PHYSICIAN ASSISTANT

## 2025-02-20 PROCEDURE — 99214 OFFICE O/P EST MOD 30 MIN: CPT | Performed by: PHYSICIAN ASSISTANT

## 2025-02-20 PROCEDURE — 87070 CULTURE OTHR SPECIMN AEROBIC: CPT | Performed by: PHYSICIAN ASSISTANT

## 2025-02-20 ASSESSMENT — PAIN SCALES - GENERAL: PAINLEVEL_OUTOF10: 8

## 2025-02-20 NOTE — PROGRESS NOTES
"Subjective   Patient ID: Mrs. Saba Hurtado is a 59 y.o. female who presents for Follow-up (Debris wound).    Presents for follow up on RLE wound.   States that the past 2 days she has had a change in her wound -  increased heat, redness, burning pain.   Had IV antibiotics at home (meropenem) discontinued last week or so. Has had increased drainage as well.   Denies fever or chills but has had increasing burning pain the last 2 days.            Review of Systems   All other systems reviewed and are negative.      Objective   /66   Pulse 97   Ht 1.727 m (5' 8\")   Wt 139 kg (306 lb)   BMI 46.53 kg/m²     Physical Exam  Constitutional:       Appearance: Normal appearance. She is obese.   HENT:      Mouth/Throat:      Pharynx: Oropharynx is clear.   Cardiovascular:      Rate and Rhythm: Normal rate and regular rhythm.   Pulmonary:      Breath sounds: Normal breath sounds.   Skin:            Comments: RLE   red anterior wound 18cm x leg circumference (50cm), surrounding leg posterior wound; nondebrided skin breakdown; copious serosanguinous drainage; stage 2 with dermis exposed areas of epidermal loss.   See attached media.   Various width - 13-17cm x 50cm  Some new skin superficially.        Neurological:      Mental Status: She is alert.               Assessment/Plan   Diagnoses and all orders for this visit:  Venous stasis ulcer of ankle with other ulcer severity, unspecified laterality, unspecified whether varicose veins present (Multi)  -     Tissue/Wound Culture/Smear  Open wound of right lower extremity with complication, subsequent encounter  -     Tissue/Wound Culture/Smear    Wound debrided with saline and Kerlix.   Wound re-wrapped:   Oil emulsion dressing x5, ABD pad, compression gauze.     Follow up in 2-3 weeks pending results.      "

## 2025-02-23 LAB
BACTERIA SPEC CULT: ABNORMAL
GRAM STN SPEC: ABNORMAL
GRAM STN SPEC: ABNORMAL

## 2025-02-26 ENCOUNTER — TELEPHONE (OUTPATIENT)
Dept: PRIMARY CARE | Facility: CLINIC | Age: 60
End: 2025-02-26
Payer: COMMERCIAL

## 2025-02-26 ENCOUNTER — PATIENT MESSAGE (OUTPATIENT)
Dept: PRIMARY CARE | Facility: CLINIC | Age: 60
End: 2025-02-26
Payer: COMMERCIAL

## 2025-02-26 DIAGNOSIS — S81.801D OPEN WOUND OF RIGHT LOWER EXTREMITY WITH COMPLICATION, SUBSEQUENT ENCOUNTER: ICD-10-CM

## 2025-02-26 DIAGNOSIS — S81.801D OPEN WOUND OF RIGHT LOWER EXTREMITY WITH COMPLICATION, SUBSEQUENT ENCOUNTER: Primary | ICD-10-CM

## 2025-02-26 RX ORDER — CEPHALEXIN 500 MG/1
500 CAPSULE ORAL 3 TIMES DAILY
Qty: 42 CAPSULE | Refills: 0 | Status: SHIPPED | OUTPATIENT
Start: 2025-02-26 | End: 2025-02-26 | Stop reason: SDUPTHER

## 2025-02-26 RX ORDER — CEPHALEXIN 500 MG/1
500 CAPSULE ORAL 3 TIMES DAILY
Qty: 42 CAPSULE | Refills: 0 | Status: SHIPPED | OUTPATIENT
Start: 2025-02-26 | End: 2025-03-12

## 2025-02-26 NOTE — TELEPHONE ENCOUNTER
Her culture shows non-resistant staph which should be susceptible to oral antibiotics. Will send 2 weeks cephalexin for this.

## 2025-03-01 DIAGNOSIS — E11.9 DIABETES MELLITUS WITHOUT COMPLICATION (MULTI): ICD-10-CM

## 2025-03-03 RX ORDER — MEDICAL SUPPLY, MISCELLANEOUS
EACH MISCELLANEOUS
Qty: 1 EACH | Refills: 0 | Status: SHIPPED | OUTPATIENT
Start: 2025-03-03

## 2025-03-05 ENCOUNTER — APPOINTMENT (OUTPATIENT)
Dept: PRIMARY CARE | Facility: CLINIC | Age: 60
End: 2025-03-05
Payer: COMMERCIAL

## 2025-03-12 ENCOUNTER — APPOINTMENT (OUTPATIENT)
Dept: RADIOLOGY | Facility: HOSPITAL | Age: 60
End: 2025-03-12
Payer: COMMERCIAL

## 2025-03-13 DIAGNOSIS — R05.9 COUGH, UNSPECIFIED TYPE: ICD-10-CM

## 2025-03-13 RX ORDER — ALBUTEROL SULFATE 90 UG/1
INHALANT RESPIRATORY (INHALATION)
Qty: 8.5 G | Refills: 1 | Status: SHIPPED | OUTPATIENT
Start: 2025-03-13

## 2025-03-14 ENCOUNTER — TELEMEDICINE (OUTPATIENT)
Dept: PRIMARY CARE | Facility: CLINIC | Age: 60
End: 2025-03-14
Payer: COMMERCIAL

## 2025-03-14 DIAGNOSIS — S81.801D OPEN WOUND OF RIGHT LOWER EXTREMITY WITH COMPLICATION, SUBSEQUENT ENCOUNTER: ICD-10-CM

## 2025-03-14 DIAGNOSIS — F41.9 ANXIETY: ICD-10-CM

## 2025-03-14 DIAGNOSIS — L97.308 VENOUS STASIS ULCER OF ANKLE WITH OTHER ULCER SEVERITY, UNSPECIFIED LATERALITY, UNSPECIFIED WHETHER VARICOSE VEINS PRESENT (MULTI): ICD-10-CM

## 2025-03-14 DIAGNOSIS — J40 BRONCHITIS: Primary | ICD-10-CM

## 2025-03-14 DIAGNOSIS — I83.003 VENOUS STASIS ULCER OF ANKLE WITH OTHER ULCER SEVERITY, UNSPECIFIED LATERALITY, UNSPECIFIED WHETHER VARICOSE VEINS PRESENT (MULTI): ICD-10-CM

## 2025-03-14 PROCEDURE — 99213 OFFICE O/P EST LOW 20 MIN: CPT | Performed by: PHYSICIAN ASSISTANT

## 2025-03-14 RX ORDER — LEVOFLOXACIN 500 MG/1
500 TABLET, FILM COATED ORAL DAILY
Qty: 10 TABLET | Refills: 0 | Status: SHIPPED | OUTPATIENT
Start: 2025-03-14 | End: 2025-03-24

## 2025-03-14 NOTE — PROGRESS NOTES
Subjective   Patient ID: Mrs. Saba Hurtado is a 59 y.o. female who presents for No chief complaint on file..    Virtual or Telephone Consent    An interactive audio and video telecommunication system which permits real time communications between the patient (at the originating site) and provider (at the distant site) was utilized to provide this telehealth service.   Verbal consent was requested and obtained from Saba Hurtado on this date, 03/14/25 for a telehealth visit and the patient's location was confirmed at the time of the visit.     Presents for follow up via telemedicine visit.     States that swelling has improved some. She has had cough and URI symptoms the last few days. Has coughing, wheezing (inhaler not helping), increased sputum.     She continues with wound to RLE. Has had recommendation of CT abdomen/pelvis to further evaluate for venous congestion. She is willing to schedule this.     Anxiety - stable on alprazolam daily for years.          Review of Systems   All other systems reviewed and are negative.      Objective   There were no vitals taken for this visit.    Physical Exam    Assessment/Plan   Diagnoses and all orders for this visit:  Bronchitis  -     levoFLOXacin (Levaquin) 500 mg tablet; Take 1 tablet (500 mg) by mouth once daily for 10 days.  Venous stasis ulcer of ankle with other ulcer severity, unspecified laterality, unspecified whether varicose veins present (Multi)  Open wound of right lower extremity with complication, subsequent encounter  Anxiety    Follow up 1 month, sooner if worsens.     Duration of call 34 mins

## 2025-03-24 ENCOUNTER — PATIENT MESSAGE (OUTPATIENT)
Dept: PRIMARY CARE | Facility: CLINIC | Age: 60
End: 2025-03-24
Payer: COMMERCIAL

## 2025-04-01 ENCOUNTER — OFFICE VISIT (OUTPATIENT)
Dept: PRIMARY CARE | Facility: CLINIC | Age: 60
End: 2025-04-01
Payer: COMMERCIAL

## 2025-04-01 VITALS
HEIGHT: 68 IN | DIASTOLIC BLOOD PRESSURE: 80 MMHG | OXYGEN SATURATION: 97 % | WEIGHT: 293 LBS | BODY MASS INDEX: 44.41 KG/M2 | HEART RATE: 73 BPM | SYSTOLIC BLOOD PRESSURE: 160 MMHG

## 2025-04-01 DIAGNOSIS — F41.9 ANXIETY: ICD-10-CM

## 2025-04-01 DIAGNOSIS — S81.801D OPEN WOUND OF RIGHT LOWER EXTREMITY WITH COMPLICATION, SUBSEQUENT ENCOUNTER: Primary | ICD-10-CM

## 2025-04-01 DIAGNOSIS — L97.308: ICD-10-CM

## 2025-04-01 DIAGNOSIS — I83.003: ICD-10-CM

## 2025-04-01 PROCEDURE — 3079F DIAST BP 80-89 MM HG: CPT | Performed by: PHYSICIAN ASSISTANT

## 2025-04-01 PROCEDURE — 3008F BODY MASS INDEX DOCD: CPT | Performed by: PHYSICIAN ASSISTANT

## 2025-04-01 PROCEDURE — 3077F SYST BP >= 140 MM HG: CPT | Performed by: PHYSICIAN ASSISTANT

## 2025-04-01 PROCEDURE — 99214 OFFICE O/P EST MOD 30 MIN: CPT | Performed by: PHYSICIAN ASSISTANT

## 2025-04-01 RX ORDER — CEPHALEXIN 500 MG/1
500 CAPSULE ORAL 3 TIMES DAILY
Qty: 42 CAPSULE | Refills: 0 | Status: SHIPPED | OUTPATIENT
Start: 2025-04-01 | End: 2025-04-15

## 2025-04-01 ASSESSMENT — PAIN SCALES - GENERAL: PAINLEVEL_OUTOF10: 7

## 2025-04-01 NOTE — PROGRESS NOTES
"Subjective   Patient ID: Mrs. Saba Hurtado is a 59 y.o. female who presents for Follow-up (Wound care).    Presents for follow up on wound - has had increased pain, significant drainage, warmth, and increased redness.   States that last antibiotic (cephalexin) was very helpful but has since worsened.   She changes the dressing 4 times/day due to excessive drainage.     Current dressing regimen:   Oil emulsion dressing with overlay of ABD pads, Kerlix, conforming gauze to foot, and compression wrap for leg.     She denies fever but has had significant increase in pain.          Review of Systems   All other systems reviewed and are negative.      Objective   /80   Pulse 73   Ht 1.727 m (5' 8\")   Wt 138 kg (304 lb)   SpO2 97%   BMI 46.22 kg/m²     Physical Exam  Constitutional:       Appearance: Normal appearance. She is obese.   Musculoskeletal:         General: Swelling present.      Comments: RLE wound:   Size: 18cm x50cm circumference with 1-2mm dept in various areas. See images attached.   Redness and drainage to medial ankle.      Neurological:      Mental Status: She is alert.                   Assessment/Plan   Diagnoses and all orders for this visit:  Open wound of right lower extremity with complication, subsequent encounter  -     Tissue/Wound Culture/Smear  -     cephalexin (Keflex) 500 mg capsule; Take 1 capsule (500 mg) by mouth 3 times a day for 14 days.  Anxiety  Venous stasis ulcer of ankle with other ulcer severity, unspecified laterality, unspecified whether varicose veins present    Due to the amount of drainage and inability to completely compress the wound, it is medically necessary to have dressing changes 4 times/day.     Recommend dispense quantity 400 of all supplies to accommodate this size and amount of drainage as gauze saturation occurs rapidly. The skin has improved with increased frequency of dressing changes.      "

## 2025-04-02 DIAGNOSIS — L21.9 SEBORRHEIC DERMATITIS: Primary | ICD-10-CM

## 2025-04-02 PROCEDURE — 87070 CULTURE OTHR SPECIMN AEROBIC: CPT | Performed by: PHYSICIAN ASSISTANT

## 2025-04-03 RX ORDER — KETOCONAZOLE 20 MG/G
CREAM TOPICAL
Qty: 30 G | Refills: 11 | Status: SHIPPED | OUTPATIENT
Start: 2025-04-03

## 2025-04-04 LAB
B-LACTAMASE ORGANISM ISLT: POSITIVE
BACTERIA SPEC CULT: ABNORMAL
BACTERIA SPEC CULT: ABNORMAL
GRAM STN SPEC: ABNORMAL
GRAM STN SPEC: ABNORMAL

## 2025-04-11 ENCOUNTER — APPOINTMENT (OUTPATIENT)
Dept: PRIMARY CARE | Facility: CLINIC | Age: 60
End: 2025-04-11
Payer: COMMERCIAL

## 2025-04-11 ENCOUNTER — OFFICE VISIT (OUTPATIENT)
Dept: PRIMARY CARE | Facility: CLINIC | Age: 60
End: 2025-04-11
Payer: COMMERCIAL

## 2025-04-11 DIAGNOSIS — S81.801D OPEN WOUND OF RIGHT LOWER EXTREMITY WITH COMPLICATION, SUBSEQUENT ENCOUNTER: ICD-10-CM

## 2025-04-11 DIAGNOSIS — F41.9 ANXIETY: ICD-10-CM

## 2025-04-11 DIAGNOSIS — Z51.81 ENCOUNTER FOR THERAPEUTIC DRUG LEVEL MONITORING: ICD-10-CM

## 2025-04-11 DIAGNOSIS — L20.9 ATOPIC DERMATITIS, UNSPECIFIED TYPE: Primary | ICD-10-CM

## 2025-04-11 PROCEDURE — 87070 CULTURE OTHR SPECIMN AEROBIC: CPT | Performed by: PHYSICIAN ASSISTANT

## 2025-04-11 PROCEDURE — 99213 OFFICE O/P EST LOW 20 MIN: CPT | Performed by: PHYSICIAN ASSISTANT

## 2025-04-11 RX ORDER — LEVOFLOXACIN 750 MG/1
750 TABLET ORAL EVERY 24 HOURS
Qty: 10 TABLET | Refills: 0 | Status: SHIPPED | OUTPATIENT
Start: 2025-04-11 | End: 2025-04-11

## 2025-04-11 RX ORDER — TRIAMCINOLONE ACETONIDE 1 MG/G
CREAM TOPICAL 2 TIMES DAILY
Qty: 80 G | Refills: 1 | Status: SHIPPED | OUTPATIENT
Start: 2025-04-11 | End: 2025-04-11

## 2025-04-11 RX ORDER — TRIAMCINOLONE ACETONIDE 1 MG/G
CREAM TOPICAL 2 TIMES DAILY
Qty: 80 G | Refills: 1 | Status: SHIPPED | OUTPATIENT
Start: 2025-04-11 | End: 2025-04-11 | Stop reason: SDUPTHER

## 2025-04-11 RX ORDER — TRIAMCINOLONE ACETONIDE 1 MG/G
CREAM TOPICAL 2 TIMES DAILY
Qty: 80 G | Refills: 1 | Status: SHIPPED | OUTPATIENT
Start: 2025-04-11

## 2025-04-11 RX ORDER — LEVOFLOXACIN 750 MG/1
750 TABLET ORAL EVERY 24 HOURS
Qty: 10 TABLET | Refills: 0 | Status: SHIPPED | OUTPATIENT
Start: 2025-04-11 | End: 2025-04-21

## 2025-04-11 RX ORDER — ALPRAZOLAM 0.5 MG/1
.5-1 TABLET ORAL 2 TIMES DAILY PRN
Qty: 120 TABLET | Refills: 1 | Status: SHIPPED | OUTPATIENT
Start: 2025-04-18 | End: 2025-04-11

## 2025-04-11 RX ORDER — ALPRAZOLAM 0.5 MG/1
.5-1 TABLET ORAL 2 TIMES DAILY PRN
Qty: 120 TABLET | Refills: 1 | Status: SHIPPED | OUTPATIENT
Start: 2025-04-18

## 2025-04-11 NOTE — PROGRESS NOTES
Subjective   Patient ID: Mrs. Saba Hurtado is a 59 y.o. female who presents for No chief complaint on file..    C/o increased pain to RLE - drainage and swelling, increased odor. Is on cephalexin without any improvement, prior culture unrevealing.     Abnormal     No polymorphonuclear leukocytes seen  (3+) Moderate Mixed Gram positive and Gram negative bacteria    Due for refill alprazolam soon as well - prior tx with many anxiolytics.Due for UDS, contract timely.            Review of Systems   All other systems reviewed and are negative.      Objective   There were no vitals taken for this visit.    Physical Exam  Constitutional:       Appearance: She is obese.   Musculoskeletal:      Comments: RLE with open wound - see prior images. Similar appearance, beefy red skin to medial ankle and anterior ankle/foot junction. Very tender.    Skin:     Comments: R arm with inflamed papular rash to lower arm and proxima forearm antecubital fossa spared.    Neurological:      Mental Status: She is alert.         Assessment/Plan   Diagnoses and all orders for this visit:  Anxiety  -     Drug Screen, Urine With Reflex to Confirmation  -     ALPRAZolam (Xanax) 0.5 mg tablet; Take 1-2 tablets (0.5-1 mg) by mouth 2 times a day as needed for anxiety. Do not fill before April 18, 2025.  Encounter for therapeutic drug level monitoring  -     Drug Screen, Urine With Reflex to Confirmation  Open wound of right lower extremity with complication, subsequent encounter  -     Tissue/Wound Culture/Smear  -     levoFLOXacin (Levaquin) 750 mg tablet; Take 1 tablet (750 mg) by mouth once every 24 hours for 10 days.

## 2025-04-13 LAB
1OH-MIDAZOLAM UR-MCNC: NEGATIVE NG/ML
7AMINOCLONAZEPAM UR-MCNC: NEGATIVE NG/ML
A-OH ALPRAZ UR-MCNC: 243 NG/ML
A-OH-TRIAZOLAM UR-MCNC: NEGATIVE NG/ML
AMPHETAMINES UR QL: NEGATIVE NG/ML
BACTERIA SPEC CULT: ABNORMAL
BARBITURATES UR QL: NEGATIVE NG/ML
BENZODIAZ UR QL: POSITIVE NG/ML
BZE UR QL: NEGATIVE NG/ML
CREAT UR-MCNC: 112.5 MG/DL
DRUG SCREEN COMMENT UR-IMP: ABNORMAL
FENTANYL UR QL SCN: NEGATIVE NG/ML
GRAM STN SPEC: ABNORMAL
GRAM STN SPEC: ABNORMAL
LORAZEPAM UR-MCNC: NEGATIVE NG/ML
METHADONE UR QL: NEGATIVE NG/ML
NORDIAZEPAM UR-MCNC: NEGATIVE NG/ML
OH-ETHYLFLURAZ UR-MCNC: NEGATIVE NG/ML
OPIATES UR QL: NEGATIVE NG/ML
OXAZEPAM UR-MCNC: NEGATIVE NG/ML
OXIDANTS UR QL: NEGATIVE MCG/ML
OXYCODONE UR QL: NEGATIVE NG/ML
PCP UR QL: NEGATIVE NG/ML
PH UR: 5.7 [PH] (ref 4.5–9)
QUEST NOTES AND COMMENTS: ABNORMAL
TEMAZEPAM UR-MCNC: NEGATIVE NG/ML
THC UR QL: NEGATIVE NG/ML

## 2025-04-14 ENCOUNTER — TELEPHONE (OUTPATIENT)
Dept: PRIMARY CARE | Facility: CLINIC | Age: 60
End: 2025-04-14
Payer: COMMERCIAL

## 2025-04-14 DIAGNOSIS — I10 ESSENTIAL HYPERTENSION: ICD-10-CM

## 2025-04-14 DIAGNOSIS — M51.362 DEGENERATION OF INTERVERTEBRAL DISC OF LUMBAR REGION WITH DISCOGENIC BACK PAIN AND LOWER EXTREMITY PAIN: Primary | ICD-10-CM

## 2025-04-14 LAB
BACTERIA SPEC CULT: ABNORMAL
GRAM STN SPEC: ABNORMAL
GRAM STN SPEC: ABNORMAL

## 2025-04-14 NOTE — TELEPHONE ENCOUNTER
Riri called stating patient is requesting a new walker and automatic BP. She said [facility] for the walker and [facility] for the BP. Both need pre-certifications.

## 2025-04-16 ENCOUNTER — HOSPITAL ENCOUNTER (OUTPATIENT)
Dept: RADIOLOGY | Facility: HOSPITAL | Age: 60
Discharge: HOME | End: 2025-04-16
Payer: COMMERCIAL

## 2025-04-16 DIAGNOSIS — S81.801D OPEN WOUND OF RIGHT LOWER EXTREMITY WITH COMPLICATION, SUBSEQUENT ENCOUNTER: ICD-10-CM

## 2025-04-16 PROCEDURE — 74176 CT ABD & PELVIS W/O CONTRAST: CPT

## 2025-04-16 RX ORDER — ACETAMINOPHEN 500 MG
TABLET ORAL
Qty: 1 EACH | Refills: 0 | Status: SHIPPED | OUTPATIENT
Start: 2025-04-16

## 2025-04-17 ENCOUNTER — PATIENT OUTREACH (OUTPATIENT)
Dept: PRIMARY CARE | Facility: CLINIC | Age: 60
End: 2025-04-17
Payer: COMMERCIAL

## 2025-04-17 DIAGNOSIS — R05.9 COUGH, UNSPECIFIED TYPE: ICD-10-CM

## 2025-04-17 RX ORDER — ALBUTEROL SULFATE 90 UG/1
INHALANT RESPIRATORY (INHALATION)
Qty: 8.5 G | Refills: 1 | Status: SHIPPED | OUTPATIENT
Start: 2025-04-17

## 2025-04-24 ENCOUNTER — APPOINTMENT (OUTPATIENT)
Dept: VASCULAR SURGERY | Facility: CLINIC | Age: 60
End: 2025-04-24
Payer: COMMERCIAL

## 2025-04-24 DIAGNOSIS — L21.9 SEBORRHEIC DERMATITIS: ICD-10-CM

## 2025-04-25 RX ORDER — KETOCONAZOLE 20 MG/ML
SHAMPOO, SUSPENSION TOPICAL
Qty: 360 ML | Refills: 3 | Status: SHIPPED | OUTPATIENT
Start: 2025-04-25

## 2025-04-25 RX ORDER — HYDROCORTISONE 1 %
CREAM (GRAM) TOPICAL
Qty: 454 G | Refills: 3 | OUTPATIENT
Start: 2025-04-25

## 2025-04-28 DIAGNOSIS — S81.801D OPEN WOUND OF RIGHT LOWER EXTREMITY WITH COMPLICATION, SUBSEQUENT ENCOUNTER: ICD-10-CM

## 2025-04-28 RX ORDER — HYDROCORTISONE 1 %
CREAM (GRAM) TOPICAL 2 TIMES DAILY
Qty: 60 G | Refills: 2 | Status: SHIPPED | OUTPATIENT
Start: 2025-04-28 | End: 2025-05-02 | Stop reason: SDUPTHER

## 2025-04-28 RX ORDER — LEVOFLOXACIN 750 MG/1
750 TABLET, FILM COATED ORAL EVERY 24 HOURS
Qty: 10 TABLET | Refills: 0 | Status: SHIPPED | OUTPATIENT
Start: 2025-04-28 | End: 2025-05-08

## 2025-04-28 RX ORDER — HYDROCORTISONE 1 %
CREAM (GRAM) TOPICAL
COMMUNITY
Start: 2025-03-19 | End: 2025-04-28 | Stop reason: SDUPTHER

## 2025-04-28 NOTE — TELEPHONE ENCOUNTER
Patient calling requesting refill on Levaquin 750 mg. Infection appears to be coming back. Also needs refill on Hydrocortisone cream.

## 2025-05-01 ENCOUNTER — APPOINTMENT (OUTPATIENT)
Dept: VASCULAR SURGERY | Facility: HOSPITAL | Age: 60
End: 2025-05-01
Payer: COMMERCIAL

## 2025-05-06 ENCOUNTER — OFFICE VISIT (OUTPATIENT)
Dept: PRIMARY CARE | Facility: CLINIC | Age: 60
End: 2025-05-06
Payer: COMMERCIAL

## 2025-05-06 VITALS
HEIGHT: 68 IN | WEIGHT: 293 LBS | BODY MASS INDEX: 44.41 KG/M2 | HEART RATE: 77 BPM | SYSTOLIC BLOOD PRESSURE: 160 MMHG | OXYGEN SATURATION: 97 % | DIASTOLIC BLOOD PRESSURE: 100 MMHG

## 2025-05-06 DIAGNOSIS — M79.672 LEFT FOOT PAIN: Primary | ICD-10-CM

## 2025-05-06 DIAGNOSIS — S81.801D OPEN WOUND OF RIGHT LOWER EXTREMITY WITH COMPLICATION, SUBSEQUENT ENCOUNTER: ICD-10-CM

## 2025-05-06 PROCEDURE — 3077F SYST BP >= 140 MM HG: CPT | Performed by: PHYSICIAN ASSISTANT

## 2025-05-06 PROCEDURE — 3080F DIAST BP >= 90 MM HG: CPT | Performed by: PHYSICIAN ASSISTANT

## 2025-05-06 PROCEDURE — 99214 OFFICE O/P EST MOD 30 MIN: CPT | Performed by: PHYSICIAN ASSISTANT

## 2025-05-06 PROCEDURE — 3008F BODY MASS INDEX DOCD: CPT | Performed by: PHYSICIAN ASSISTANT

## 2025-05-06 PROCEDURE — 87075 CULTR BACTERIA EXCEPT BLOOD: CPT | Performed by: PHYSICIAN ASSISTANT

## 2025-05-06 RX ORDER — HYDROCORTISONE 1 %
CREAM (GRAM) TOPICAL 2 TIMES DAILY
Qty: 453.6 G | Refills: 2 | Status: SHIPPED | OUTPATIENT
Start: 2025-05-06 | End: 2025-05-09 | Stop reason: SDUPTHER

## 2025-05-06 ASSESSMENT — PAIN SCALES - GENERAL: PAINLEVEL_OUTOF10: 7

## 2025-05-06 NOTE — PROGRESS NOTES
"Subjective   Patient ID: Mrs. Saba Hurtado is a 59 y.o. female who presents for Wound Care.    Presents for follow up on wound - has had continued pain, drainage, warmth, and redness.   Has been on levofloxacin for wound infection.  She changes the dressing 4 times/day due to excessive drainage.     Current dressing regimen:   Oil emulsion dressing with overlay of ABD pads, Kerlix, conforming gauze to foot, and compression wrap for leg. Has had to use 2 Kerlix due to increased drainage and supply issues.     Also with increasing foot pain specifically to L foot and toes. Has had blood blisters as well as pain to 3rd-5th toes and dorsal foot             Review of Systems   All other systems reviewed and are negative.      Objective   BP (!) 160/100   Pulse 77   Ht 1.727 m (5' 8\")   Wt 142 kg (312 lb)   SpO2 97%   BMI 47.44 kg/m²     Physical Exam  Constitutional:       Appearance: Normal appearance. She is obese.   Musculoskeletal:         General: Swelling present.      Comments: RLE wound:   Size: 18cm x50cm circumference with 1-2mm dept in various areas. See images attached.   Redness and drainage to medial ankle.      Neurological:      Mental Status: She is alert.                   Assessment/Plan   Diagnoses and all orders for this visit:  Left foot pain  -     XR foot left 3+ views; Future  Open wound of right lower extremity with complication, subsequent encounter  -     hydrocortisone 1 % cream; Apply topically 2 times a day.      Due to the amount of drainage and inability to completely compress the wound, it is medically necessary to have dressing changes 4 times/day.      Recommend dispense quantity 400 of all supplies to accommodate this size and amount of drainage as gauze saturation occurs rapidly. The skin has improved with increased frequency of dressing changes.  "

## 2025-05-06 NOTE — LETTER
May 6, 2025     Patient: Saba Hurtado   YOB: 1965   Date of Visit: 5/6/2025       To Whom It May Concern:    Saba Hurtado was seen in my clinic on 5/6/2025 at 1:00 pm.       Due to chronic health conditions it is medically necessary that she have access to air conditioning.    If you have any questions or concerns, please don't hesitate to call.         Sincerely,         Constantine Gomez PA-C

## 2025-05-07 DIAGNOSIS — M13.0 POLYARTHRITIS: ICD-10-CM

## 2025-05-07 PROBLEM — Z86.79 HISTORY OF ANGINA PECTORIS: Status: ACTIVE | Noted: 2025-05-07

## 2025-05-07 PROBLEM — G89.29 CHRONIC BACK PAIN: Status: ACTIVE | Noted: 2025-05-07

## 2025-05-07 PROBLEM — Z86.79 HISTORY OF HYPERTENSION: Status: ACTIVE | Noted: 2025-05-07

## 2025-05-07 PROBLEM — M54.9 CHRONIC BACK PAIN: Status: ACTIVE | Noted: 2025-05-07

## 2025-05-07 PROBLEM — R42 DIZZINESS AND GIDDINESS: Status: ACTIVE | Noted: 2025-05-07

## 2025-05-07 PROBLEM — R60.0 EDEMA OF BOTH LOWER EXTREMITIES: Status: ACTIVE | Noted: 2025-05-07

## 2025-05-07 PROBLEM — M54.50 LOW BACK PAIN: Status: ACTIVE | Noted: 2025-05-07

## 2025-05-07 RX ORDER — BLOOD SUGAR DIAGNOSTIC
STRIP MISCELLANEOUS
COMMUNITY
Start: 2025-04-17

## 2025-05-07 RX ORDER — AMLODIPINE BESYLATE 5 MG/1
1 TABLET ORAL
COMMUNITY
Start: 2024-08-25

## 2025-05-08 ENCOUNTER — OFFICE VISIT (OUTPATIENT)
Dept: VASCULAR SURGERY | Facility: CLINIC | Age: 60
End: 2025-05-08
Payer: COMMERCIAL

## 2025-05-08 ENCOUNTER — APPOINTMENT (OUTPATIENT)
Dept: VASCULAR SURGERY | Facility: CLINIC | Age: 60
End: 2025-05-08
Payer: COMMERCIAL

## 2025-05-08 VITALS
HEIGHT: 69 IN | WEIGHT: 293 LBS | HEART RATE: 80 BPM | OXYGEN SATURATION: 98 % | DIASTOLIC BLOOD PRESSURE: 100 MMHG | SYSTOLIC BLOOD PRESSURE: 207 MMHG | BODY MASS INDEX: 43.4 KG/M2

## 2025-05-08 DIAGNOSIS — E66.01 MORBID OBESITY (MULTI): ICD-10-CM

## 2025-05-08 DIAGNOSIS — I89.0 LYMPHEDEMA: Primary | ICD-10-CM

## 2025-05-08 DIAGNOSIS — S81.802D OPEN WOUND OF LEFT LOWER LEG, SUBSEQUENT ENCOUNTER: ICD-10-CM

## 2025-05-08 PROCEDURE — 3080F DIAST BP >= 90 MM HG: CPT | Performed by: SURGERY

## 2025-05-08 PROCEDURE — 99213 OFFICE O/P EST LOW 20 MIN: CPT | Performed by: SURGERY

## 2025-05-08 PROCEDURE — 3008F BODY MASS INDEX DOCD: CPT | Performed by: SURGERY

## 2025-05-08 PROCEDURE — 3077F SYST BP >= 140 MM HG: CPT | Performed by: SURGERY

## 2025-05-08 RX ORDER — IBUPROFEN 800 MG/1
TABLET ORAL
Qty: 90 TABLET | Refills: 1 | Status: SHIPPED | OUTPATIENT
Start: 2025-05-08

## 2025-05-08 ASSESSMENT — ENCOUNTER SYMPTOMS
LOSS OF SENSATION IN FEET: 1
DEPRESSION: 0
OCCASIONAL FEELINGS OF UNSTEADINESS: 1

## 2025-05-08 ASSESSMENT — PAIN SCALES - GENERAL: PAINLEVEL_OUTOF10: 0-NO PAIN

## 2025-05-08 NOTE — PROGRESS NOTES
F/U REASON: chronic right leg wound    CURRENT ENCOUNTER:  Saba Hurtado is 59 y.o. female here for follow up of chronic right leg wound.  Contrast: anaphylaxis - within the last 10 yrs.     Right leg woundhas been there >1 year  Quite painful    Getting wound care with her primary care doctor at this point  She debrides it at home - uses a curad dressing which seems to work for her  Oil emulsion dressing (dermarite) after her shower;   The curad version appears to do better for her  Abd and kerlix  Conforming stretch gauze  Uses tubigrip - single layer  Has drainage   No wounds on the left  Also uses ace wrap at times    Hospitalized January 2025 - at [facility] - was on abx;   Was on abx about 2 weeks ago    No home care at this time.   Last time  she was in wound care was 1 year ago in [facility] - did not actually get any wound care at [facility] thus far - just had consultation with Elia Maldonado     States has trouble with wound care due to overall issues with reactions to different dressings and disagreement about which dressing works for her. Reports allergic to some dressings.     Using medical grade compression stockings: tubi   Previous vein procedures: no  Previous phlebitis/DVT/PE: no  Previous venous ulcers: active right leg      Meds:   Current Medications[1]    Allergies:   RX Allergies[2]    ROS:  Review of Systems  otherwise unremarkable    Objective:  Vitals:  Vitals:    05/08/25 0938   BP: (!) 207/100   Pulse: 80   SpO2: 98%        Exam:  No distress  Breathing comfortably   Not tachycardic  Abd soft, nt, nd, obese  Bilateral legs warm and well perfused  Notable edema changes consistent primarily with lymphedema with dorsal hump and digital level edema R>L  Circumferential wound on right from mid calf down to ankle.   weepy                Labs:  Lab Results   Component Value Date    WBC 8.4 02/12/2025    WBC 10.1 02/06/2025    WBC 8.9 01/30/2025    HGB 14.1 02/12/2025    HGB 13.1 02/06/2025     HGB 14.3 2025    HCT 42.7 2025    HCT 39.9 2025    HCT 45.9 2025    MCV 87.9 2025    MCV 88.1 2025    MCV 91 2025     2025     Lab Results   Component Value Date    CREATININE 0.49 (L) 2025    CREATININE 0.43 (L) 2025    CREATININE 0.62 2025    BUN 19 2025    BUN 20 2025    BUN 20 2025     2025     2025     2025    K 4.3 2025    K 4.2 2025    K 4.0 2025     2025     2025     (H) 2025    CO2 26 2025    CO2 27 2025    CO2 22 2025         [id number] VI scan  No axial reflux  RLE deep venous reflux    2025 CT scan  IMPRESSION:  1.  Hepatosplenomegaly with hepatic steatosis.  2. Cholelithiasis.  3. Moderate to severe right-sided hydronephrosis with no evidence of  hydroureter and abrupt caliber change in the region of the  ureteropelvic junction. Findings are likely compatible with a chronic  ureteropelvic junction obstruction. Correlate with renal Lasix  nuclear medicine scan.  4. Wall thickening of the lower anterior abdominal wall with  infiltration of the underlying subcutaneous tissues which may  indicate localized infectious or inflammatory process suggest  cellulitis. Clinically correlate.      Assessment & Plan:  Saba Hurtado is 59 y.o. female with >1 year of RLE chronic progressive wound in setting of what appears to be lymphedema primarily. Also BMI ~46, poor calf pump function, and lack of established wound care.     Discussed importance of establishing with a wound center for consistent management and then aiming for lymphedema therapy.   CT without obvious compression.     1) we need improved edema control and established wound care - I am referring you to Dr. Donis and her wound center is at [facility] or else I would try [facility] wound care.   2) once your wound has healed then you  "can start lymphedema therapy in the future - but can't do this with a wound at this time  3) I can see you back in 6 months.     Amna Bacon MD, MHS, RPVI  , [employer] [facility] [employer]  Director, [facility] Venous Care, [facility] [place]  Co-Director, Vascular Laboratories, [facility] [place]  Division of Vascular Surgery and Endovascular Therapy  [facility]                 [1]   Current Outpatient Medications:     albuterol 90 mcg/actuation inhaler, INHALE 2 PUFFS BY MOUTH EVERY 4 HOURS AS NEEDED FOR SHORTNESS OF BREATH OR WHEEZING, Disp: 8.5 g, Rfl: 1    ALPRAZolam (Xanax) 0.5 mg tablet, Take 1-2 tablets (0.5-1 mg) by mouth 2 times a day as needed for anxiety. Do not fill before April 18, 2025., Disp: 120 tablet, Rfl: 1    amLODIPine (Norvasc) 5 mg tablet, Take 1 tablet (5 mg) by mouth early in the morning.., Disp: , Rfl:     blood pressure test kit-wrist kit, OMRON 7 Series wrist cuff - tests BP as needed, Disp: 1 each, Rfl: 0    elastic bandage (Coban) 4 X 5 \"-yard bandage, Apply 1 Units topically once daily., Disp: 30 each, Rfl: 3    Flonase Allergy Relief 50 mcg/actuation nasal spray, Administer 2 sprays into each nostril once daily., Disp: 16 g, Rfl: 11    furosemide (Lasix) 20 mg tablet, TAKE 1 TABLET(20 MG) BY MOUTH DAILY AS NEEDED FOR SWELLING, Disp: 90 tablet, Rfl: 1    gauze bandage (Kerlix) 4 1/2 X 147 \" bandage, Apply 1 Units topically once daily., Disp: 30 each, Rfl: 3    guaiFENesin (Mucinex) 600 mg 12 hr tablet, Take 1 tablet (600 mg) by mouth 2 times a day. Do not crush, chew, or split., Disp: 60 tablet, Rfl: 3    hydrocortisone 1 % cream, Apply topically 2 times a day., Disp: 453.6 g, Rfl: 2    ibuprofen 800 mg tablet, Take 1 tablet (800 mg) by mouth every 8 hours if needed for mild pain (1 - 3)., Disp: , Rfl:     ketoconazole (NIZOral) 2 % cream, AAPLY 1 " APPLICATION TO THE TO THE AFFECTED AREA TWICE DAILY EXTERNALLY, Disp: 30 g, Rfl: 11    ketoconazole (NIZOral) 2 % shampoo, APPLY TO THE AFFECTED AREA TWICE DAILY, Disp: 360 mL, Rfl: 3    lancets (OneTouch Delica Plus Lancet) 30 gauge misc, 1 Units by in vitro route 2 times a day., Disp: 100 each, Rfl: 3    lancets 30 gauge misc, once daily. CHECK ONCE DAILY, Disp: , Rfl:     levoFLOXacin (Levaquin) 750 mg tablet, Take 1 tablet (750 mg) by mouth once every 24 hours for 10 days., Disp: 10 tablet, Rfl: 0    OneTouch Ultra Control solution, USE CONTROL SOLUTION AS DIRECTED., Disp: 1 each, Rfl: 0    OneTouch Ultra Test, USE AS DIRECTED TWICE DAILY, Disp: , Rfl:     OneTouch Ultra2 Meter misc, 1 Units by in vitro route once daily., Disp: 1 each, Rfl: 1    Saline NasaL 0.65 % nasal spray, USE 2 SPRAYS IN EACH NOSTRIL AS NEEDED DAILY, Disp: 44 mL, Rfl: 11    Vivelle-Dot 0.1 mg/24 hr patch, Place 1 patch on the skin 2 times a week., Disp: 24 patch, Rfl: 3    walker misc, Heavy duty bariatric rollator extra wide, Disp: 1 each, Rfl: 0  [2]   Allergies  Allergen Reactions    Clarithromycin Anaphylaxis and Unknown    Diazepam Anaphylaxis    Erythromycin Anaphylaxis    Heparin Anaphylaxis    Iodinated Contrast Media Anaphylaxis    Lorazepam Anaphylaxis and Unknown    Meperidine Anaphylaxis    Morphine Anaphylaxis    Norvasc [Amlodipine] Other and Cardiac arrhythmia/arrest    Oxycodone Anaphylaxis and Unknown    Pertussis Vaccines Anaphylaxis    Prednisone Anaphylaxis and Unknown    Tramadol Anaphylaxis    Unna-Flex Elastic Unna Boot [Zinc Oxide] Rash     Caused skin ulcers to form    Clindamycin Nausea/vomiting    Adhesive Tape-Silicones Rash    Amoxicillin Unknown     Tolerates cephalosporins    Aspirin Unknown    Dicyclomine Unknown    Diphenoxylate-Atropine Unknown    Doxycycline Unknown    Hydrocodone Unknown    Hydrocodone-Acetaminophen Unknown    Propoxyphene-Acetaminophen Unknown    Pse-Hydrocodone-Pot Guaiaco Unknown

## 2025-05-08 NOTE — PATIENT INSTRUCTIONS
It was a pleasure taking care of you today and appreciate your seeing us at our [facility].     Today's plan is as follows:  1) we need improved edema control and established wound care - I am referring you [facility] Donis [facility] I would try [facility] wound care.   2) once your wound has healed then you can start lymphedema therapy in the future - but can't do this with a wound at this time  3) I can see you back in 6 months.       Please call the office with any questions at [phone].   You can speak to our secretaries or our clinical nurses for specific questions.   For [facility] specific questions, you can also call [phone] or email at [email or web address]  If you need coordinating your appointments and testing you can do these at the  or by calling my office shortly after your visit.

## 2025-05-09 ENCOUNTER — PATIENT MESSAGE (OUTPATIENT)
Dept: PRIMARY CARE | Facility: CLINIC | Age: 60
End: 2025-05-09
Payer: COMMERCIAL

## 2025-05-09 DIAGNOSIS — S81.801D OPEN WOUND OF RIGHT LOWER EXTREMITY WITH COMPLICATION, SUBSEQUENT ENCOUNTER: ICD-10-CM

## 2025-05-09 RX ORDER — HYDROCORTISONE 1 %
CREAM (GRAM) TOPICAL 2 TIMES DAILY
Qty: 453.6 G | Refills: 2 | Status: SHIPPED | OUTPATIENT
Start: 2025-05-09

## 2025-05-10 LAB
BACTERIA SPEC CULT: ABNORMAL
GRAM STN SPEC: ABNORMAL
GRAM STN SPEC: ABNORMAL

## 2025-05-15 ENCOUNTER — PATIENT MESSAGE (OUTPATIENT)
Dept: PRIMARY CARE | Facility: CLINIC | Age: 60
End: 2025-05-15
Payer: COMMERCIAL

## 2025-05-15 DIAGNOSIS — S81.801D OPEN WOUND OF RIGHT LOWER EXTREMITY WITH COMPLICATION, SUBSEQUENT ENCOUNTER: Primary | ICD-10-CM

## 2025-05-16 RX ORDER — CEPHALEXIN 500 MG/1
500 CAPSULE ORAL 3 TIMES DAILY
Qty: 30 CAPSULE | Refills: 0 | Status: SHIPPED | OUTPATIENT
Start: 2025-05-16 | End: 2025-05-26

## 2025-05-21 ENCOUNTER — HOSPITAL ENCOUNTER (OUTPATIENT)
Dept: RADIOLOGY | Facility: HOSPITAL | Age: 60
Discharge: HOME | End: 2025-05-21
Payer: COMMERCIAL

## 2025-05-21 DIAGNOSIS — M79.672 LEFT FOOT PAIN: ICD-10-CM

## 2025-05-21 PROCEDURE — 73630 X-RAY EXAM OF FOOT: CPT | Mod: LEFT SIDE | Performed by: RADIOLOGY

## 2025-05-21 PROCEDURE — 73630 X-RAY EXAM OF FOOT: CPT | Mod: LT

## 2025-06-10 DIAGNOSIS — F41.9 ANXIETY: ICD-10-CM

## 2025-06-10 DIAGNOSIS — R05.9 COUGH, UNSPECIFIED TYPE: ICD-10-CM

## 2025-06-10 RX ORDER — ALBUTEROL SULFATE 90 UG/1
INHALANT RESPIRATORY (INHALATION)
Qty: 8.5 G | Refills: 1 | Status: SHIPPED | OUTPATIENT
Start: 2025-06-10

## 2025-06-11 ENCOUNTER — OFFICE VISIT (OUTPATIENT)
Dept: WOUND CARE | Facility: HOSPITAL | Age: 60
End: 2025-06-11
Payer: COMMERCIAL

## 2025-06-11 DIAGNOSIS — I89.0 LYMPHEDEMA: ICD-10-CM

## 2025-06-11 DIAGNOSIS — E66.01 MORBID OBESITY (MULTI): ICD-10-CM

## 2025-06-11 DIAGNOSIS — S81.802D OPEN WOUND OF LEFT LOWER LEG, SUBSEQUENT ENCOUNTER: Primary | ICD-10-CM

## 2025-06-11 PROCEDURE — 99213 OFFICE O/P EST LOW 20 MIN: CPT | Mod: 25

## 2025-06-11 PROCEDURE — 11045 DBRDMT SUBQ TISS EACH ADDL: CPT | Mod: RT

## 2025-06-11 PROCEDURE — 11042 DBRDMT SUBQ TIS 1ST 20SQCM/<: CPT | Mod: RT

## 2025-06-11 RX ORDER — HONEY 100 %
1 PASTE (ML) TOPICAL DAILY
Qty: 15 ML | Refills: 9 | Status: SHIPPED | OUTPATIENT
Start: 2025-06-11 | End: 2025-07-11

## 2025-06-11 RX ORDER — HONEY 100 %
1 PASTE (ML) TOPICAL DAILY
Qty: 15 ML | Refills: 9 | Status: SHIPPED | OUTPATIENT
Start: 2025-06-11 | End: 2025-06-11

## 2025-06-12 ENCOUNTER — APPOINTMENT (OUTPATIENT)
Dept: WOUND CARE | Facility: HOSPITAL | Age: 60
End: 2025-06-12
Payer: COMMERCIAL

## 2025-06-13 RX ORDER — ALPRAZOLAM 0.5 MG/1
TABLET ORAL
Qty: 120 TABLET | Refills: 1 | Status: SHIPPED | OUTPATIENT
Start: 2025-06-14

## 2025-06-14 LAB
BACTERIA SPEC AEROBE CULT: ABNORMAL
BACTERIA SPEC ANAEROBE CULT: ABNORMAL

## 2025-06-16 DIAGNOSIS — L97.812 NON-PRESSURE CHRONIC ULCER OF OTHER PART OF RIGHT LOWER LEG WITH FAT LAYER EXPOSED: Primary | ICD-10-CM

## 2025-06-17 LAB
BACTERIA SPEC AEROBE CULT: ABNORMAL
BACTERIA SPEC ANAEROBE CULT: ABNORMAL

## 2025-06-18 ENCOUNTER — OFFICE VISIT (OUTPATIENT)
Dept: PRIMARY CARE | Facility: CLINIC | Age: 60
End: 2025-06-18
Payer: COMMERCIAL

## 2025-06-18 VITALS
WEIGHT: 293 LBS | DIASTOLIC BLOOD PRESSURE: 98 MMHG | OXYGEN SATURATION: 98 % | HEIGHT: 68 IN | HEART RATE: 79 BPM | BODY MASS INDEX: 44.41 KG/M2 | SYSTOLIC BLOOD PRESSURE: 168 MMHG

## 2025-06-18 DIAGNOSIS — E11.9 DIABETES MELLITUS WITHOUT COMPLICATION: Primary | ICD-10-CM

## 2025-06-18 DIAGNOSIS — I10 ESSENTIAL HYPERTENSION: ICD-10-CM

## 2025-06-18 DIAGNOSIS — M51.362 DEGENERATION OF INTERVERTEBRAL DISC OF LUMBAR REGION WITH DISCOGENIC BACK PAIN AND LOWER EXTREMITY PAIN: ICD-10-CM

## 2025-06-18 PROCEDURE — 99214 OFFICE O/P EST MOD 30 MIN: CPT | Performed by: PHYSICIAN ASSISTANT

## 2025-06-18 PROCEDURE — 3077F SYST BP >= 140 MM HG: CPT | Performed by: PHYSICIAN ASSISTANT

## 2025-06-18 PROCEDURE — 3080F DIAST BP >= 90 MM HG: CPT | Performed by: PHYSICIAN ASSISTANT

## 2025-06-18 PROCEDURE — 1036F TOBACCO NON-USER: CPT | Performed by: PHYSICIAN ASSISTANT

## 2025-06-18 PROCEDURE — 3008F BODY MASS INDEX DOCD: CPT | Performed by: PHYSICIAN ASSISTANT

## 2025-06-18 PROCEDURE — 4010F ACE/ARB THERAPY RXD/TAKEN: CPT | Performed by: PHYSICIAN ASSISTANT

## 2025-06-18 RX ORDER — DULAGLUTIDE 0.75 MG/.5ML
0.75 INJECTION, SOLUTION SUBCUTANEOUS WEEKLY
Qty: 2 ML | Refills: 2 | Status: SHIPPED | OUTPATIENT
Start: 2025-06-18

## 2025-06-18 RX ORDER — LISINOPRIL 10 MG/1
10 TABLET ORAL DAILY
Qty: 100 TABLET | Refills: 3 | Status: SHIPPED | OUTPATIENT
Start: 2025-06-18 | End: 2026-07-23

## 2025-06-18 ASSESSMENT — PAIN SCALES - GENERAL: PAINLEVEL_OUTOF10: 7

## 2025-06-18 NOTE — LETTER
June 18, 2025     Patient: Saba Hurtado   YOB: 1965   Date of Visit: 6/18/2025       To Whom It May Concern:    Saba Hurtado was seen in my clinic on 6/18/2025 at 1:00 pm.     Due to her medical conditions she is unable to drive and requires transportation to her appointments.     Further, due to her mobility constraints she is unable to transfer into/out of a car, thus requiring a van or SUV for transportation.      Please contact us with any questions.      Sincerely,         Constantine Gomez PA-C

## 2025-06-18 NOTE — LETTER
June 18, 2025     Patient: Saba Hurtado   YOB: 1965   Date of Visit: 6/18/2025       To Whom It May Concern:    Saba Hurtado was seen in my clinic on 6/18/2025 at 1:00 pm.     Due to her medical conditions she requires transportation to her appointments.     Further, due to her mobility constraints she is unable to transfer into/out of a car, thus requiring a van or SUV for transportation.      Please contact us with any questions.      Sincerely,         Constantine Gomez PA-C

## 2025-06-18 NOTE — PROGRESS NOTES
"Subjective   Patient ID: Mrs. Saba Hurtado is a 59 y.o. female who presents for Follow-up.    Presents for follow up -   1) Has been under wound care clinic and will be under infectious disease. Has routine follow up tomorrow. Reports that she has had pain and trouble covering wound due to supply issues.   2) Anxiety poorly controlled. Uses alprazolam routinely.   3) DM - diet controlled currently though has spikes. Metformin intolerant.   4) HTN - poorly controlled - attributes pain as cause of spikes as well as swelling. Has fluctuations - previously on 40mg lisinopril unsure why this was discontinued.     Requesting new walker - currently uses extra wide rollator with padded seat, stationary back rest, and brakes. Has falls and trouble ambulating without this.   Fax to [facility] [phone]         Review of Systems   All other systems reviewed and are negative.      Objective   BP (!) 168/98   Pulse 79   Ht 1.727 m (5' 8\")   Wt 139 kg (307 lb)   SpO2 98%   BMI 46.68 kg/m²     Physical Exam  Constitutional:       Appearance: Normal appearance. She is obese.   Cardiovascular:      Rate and Rhythm: Normal rate and regular rhythm.   Neurological:      Mental Status: She is alert.         Assessment/Plan   Diagnoses and all orders for this visit:  Diabetes mellitus without complication  -     dulaglutide (Trulicity) 0.75 mg/0.5 mL pen injector; Inject 0.75 mg under the skin 1 (one) time per week.  Degeneration of intervertebral disc of lumbar region with discogenic back pain and lower extremity pain  -     Walker with Seat  Essential hypertension  -     lisinopril 10 mg tablet; Take 1 tablet (10 mg) by mouth once daily.    Recommend contacting wound clinic for all supplies.      "

## 2025-06-19 ENCOUNTER — OFFICE VISIT (OUTPATIENT)
Dept: WOUND CARE | Facility: HOSPITAL | Age: 60
End: 2025-06-19
Payer: COMMERCIAL

## 2025-06-19 PROCEDURE — 11045 DBRDMT SUBQ TISS EACH ADDL: CPT | Mod: RT,59

## 2025-06-19 PROCEDURE — 11042 DBRDMT SUBQ TIS 1ST 20SQCM/<: CPT | Mod: RT

## 2025-06-30 ENCOUNTER — APPOINTMENT (OUTPATIENT)
Dept: WOUND CARE | Facility: HOSPITAL | Age: 60
End: 2025-06-30
Payer: COMMERCIAL

## 2025-07-07 ENCOUNTER — OFFICE VISIT (OUTPATIENT)
Dept: WOUND CARE | Facility: HOSPITAL | Age: 60
End: 2025-07-07
Payer: COMMERCIAL

## 2025-07-07 DIAGNOSIS — I89.0 LYMPHEDEMA OF RIGHT LOWER EXTREMITY: Primary | ICD-10-CM

## 2025-07-07 DIAGNOSIS — I87.2 VENOUS INSUFFICIENCY: ICD-10-CM

## 2025-07-07 PROCEDURE — 11042 DBRDMT SUBQ TIS 1ST 20SQCM/<: CPT | Performed by: SURGERY

## 2025-07-07 PROCEDURE — 11045 DBRDMT SUBQ TISS EACH ADDL: CPT | Performed by: SURGERY

## 2025-07-07 PROCEDURE — 11045 DBRDMT SUBQ TISS EACH ADDL: CPT

## 2025-07-07 PROCEDURE — 11042 DBRDMT SUBQ TIS 1ST 20SQCM/<: CPT

## 2025-07-07 PROCEDURE — 4010F ACE/ARB THERAPY RXD/TAKEN: CPT | Performed by: SURGERY

## 2025-07-08 DIAGNOSIS — R05.9 COUGH, UNSPECIFIED TYPE: ICD-10-CM

## 2025-07-08 DIAGNOSIS — E11.9 DIABETES MELLITUS WITHOUT COMPLICATION: ICD-10-CM

## 2025-07-09 ENCOUNTER — PATIENT MESSAGE (OUTPATIENT)
Dept: PRIMARY CARE | Facility: CLINIC | Age: 60
End: 2025-07-09
Payer: COMMERCIAL

## 2025-07-09 ENCOUNTER — PATIENT MESSAGE (OUTPATIENT)
Dept: PRIMARY CARE | Facility: CLINIC | Age: 60
End: 2025-07-09

## 2025-07-09 DIAGNOSIS — M13.0 POLYARTHRITIS: ICD-10-CM

## 2025-07-09 RX ORDER — ALBUTEROL SULFATE 90 UG/1
INHALANT RESPIRATORY (INHALATION)
Qty: 8.5 G | Refills: 1 | Status: SHIPPED | OUTPATIENT
Start: 2025-07-09

## 2025-07-09 RX ORDER — IBUPROFEN 800 MG/1
TABLET, FILM COATED ORAL
Qty: 90 TABLET | Refills: 1 | Status: SHIPPED | OUTPATIENT
Start: 2025-07-09

## 2025-07-09 RX ORDER — LANCETS 30 GAUGE
EACH MISCELLANEOUS
Qty: 1 EACH | Refills: 1 | Status: SHIPPED | OUTPATIENT
Start: 2025-07-09

## 2025-07-10 NOTE — PROGRESS NOTES
"Subjective   Patient ID: Mrs. Saba Hurtado is a 59 y.o. female who presents for Follow-up (Vivelle dot 90 day supply -- [facility] /Ibuprofen 800 MG refill-- Jonathon Acharya ).    Presents for follow up - continues with B/L LE wounds. Swelling is going down with ongoing compression.   Has had continued pain to RLE which feels very sore at times, sharp pains, described as burning, scraping pain. Has had improvement to posterior wound.     Anxiety - stable on alprazolam for years without adverse effects. Recent notice of possible insurance change on alprazolam. Prior failure of lorazepam and diazepam due to allergy.     Continues with home wrapping of oil emulsion dressing, abd pads, conforming gauze and ACE.     Has higher cost on her own as there has been coverage issues. Due to issues with compression wraps and gauze has used a below knee compression wrap size 3XL which has helped. Requesting prior auth for retroactive approval as she has a bill from July.     Has had visit with Dr. BURNETT for infectious disease and also has had visit with vascular, will be consulting surgery in a few weeks.          Review of Systems   All other systems reviewed and are negative.      Objective   /78   Pulse 79   Ht 1.74 m (5' 8.5\")   Wt 140 kg (309 lb)   SpO2 97%   BMI 46.30 kg/m²     Physical Exam  Constitutional:       Appearance: Normal appearance. She is obese.   HENT:      Mouth/Throat:      Pharynx: Oropharynx is clear.   Cardiovascular:      Rate and Rhythm: Normal rate and regular rhythm.      Heart sounds: Normal heart sounds.   Pulmonary:      Breath sounds: Normal breath sounds.   Skin:     Comments: RLE   Number of wounds: 1) red anterior wound 15cm x leg circumference (50cm), surrounding leg posterior wound; nondebrided venous stasis ulcer; copious serosanguinous drainage; stage 2-3 with dermis exposed complete epidermal loss.   See attached media.        Neurological:      Mental " You had previously enrolled in our Chronic Care Management program and had been speaking with your Health .   Currently, we are graduating patients who have been maintaining health. We are so proud of your dedication and hard work in achieving your health goals!     If you have any questions or concerns, or if you find that you need additional support, please call your Primary Care Provider's office. They will be more than happy to assist you.    It was a pleasure working with you,    [facility]  Care Management Department     Status: She is alert.         Assessment/Plan   Diagnoses and all orders for this visit:  Open wound of right lower extremity with complication, subsequent encounter  Polyarthritis  -     ibuprofen 800 mg tablet; Take 1 tablet (800 mg) by mouth 3 times a day.  Menopausal symptoms  -     Vivelle-Dot 0.1 mg/24 hr patch; Place 1 patch on the skin 2 times a week.  Venous ulcer of right lower extremity without varicose veins (Multi)  -     Tissue/Wound Culture/Smear; Future

## 2025-07-14 ENCOUNTER — OFFICE VISIT (OUTPATIENT)
Dept: WOUND CARE | Facility: HOSPITAL | Age: 60
End: 2025-07-14
Payer: COMMERCIAL

## 2025-07-14 DIAGNOSIS — L03.115 CELLULITIS OF LEG, RIGHT: Primary | ICD-10-CM

## 2025-07-14 PROCEDURE — 99213 OFFICE O/P EST LOW 20 MIN: CPT | Performed by: SURGERY

## 2025-07-14 PROCEDURE — 99213 OFFICE O/P EST LOW 20 MIN: CPT

## 2025-07-14 RX ORDER — LEVOFLOXACIN 750 MG/1
750 TABLET, FILM COATED ORAL DAILY
Status: SHIPPED | OUTPATIENT
Start: 2025-07-14 | End: 2025-07-21

## 2025-07-14 RX ORDER — LEVOFLOXACIN 750 MG/1
750 TABLET, FILM COATED ORAL EVERY 24 HOURS
Status: SHIPPED | OUTPATIENT
Start: 2025-07-14 | End: 2025-07-24

## 2025-07-14 RX ORDER — CEPHALEXIN 500 MG/1
500 CAPSULE ORAL 2 TIMES DAILY
Qty: 20 CAPSULE | Refills: 0 | Status: SHIPPED | OUTPATIENT
Start: 2025-07-14 | End: 2025-07-24

## 2025-07-17 ENCOUNTER — EVALUATION (OUTPATIENT)
Dept: OCCUPATIONAL THERAPY | Facility: HOSPITAL | Age: 60
End: 2025-07-17
Payer: MEDICARE

## 2025-07-17 DIAGNOSIS — I89.0 LYMPHEDEMA: Primary | ICD-10-CM

## 2025-07-17 PROCEDURE — 97166 OT EVAL MOD COMPLEX 45 MIN: CPT | Mod: GO | Performed by: OCCUPATIONAL THERAPIST

## 2025-07-17 ASSESSMENT — ENCOUNTER SYMPTOMS
DEPRESSION: 0
OCCASIONAL FEELINGS OF UNSTEADINESS: 1
LOSS OF SENSATION IN FEET: 1

## 2025-07-17 ASSESSMENT — PAIN - FUNCTIONAL ASSESSMENT: PAIN_FUNCTIONAL_ASSESSMENT: 0-10

## 2025-07-17 ASSESSMENT — PAIN SCALES - GENERAL: PAINLEVEL_OUTOF10: 5 - MODERATE PAIN

## 2025-07-17 NOTE — PROGRESS NOTES
"  Occupational Therapy  Lymphedema Evaluation    Patient Name: Saba Hurtado \"Mrs. Saba Hurtado\"  MRN: [id number]  Encounter Date: 7/17/2025  Time Calculation  Start Time: 1013  Stop Time: 1130  Time Calculation (min): 77 min  Today's Charges:  OT Evaluation Time Entry  Evaluation (Moderate) Time Entry: 77        Insurance:  Visit number: Eval  Certification Period Start Date: 07/18/25  Certification Period End Date: 10/10/25  Authorization info: needs auth  Payor: [employer] / Plan: [employer] / Product Type: *No Product type* /     Current Problem  1. Lymphedema          Problem List Items Addressed This Visit           ICD-10-CM    Lymphedema - Primary I89.0     Reason for visit: Swelling in BLE, RLE > than L with increased wounds and weeping. Unable to have vascular procedure until wounds healed  Referred by: Humberto Rodriguez MD    Medical History Form: Reviewed (scanned into chart)    Subjective   Chief Complaint: Patient presents to clinic open wounds on RLE with increased drainage  Onset Date: 7/7/2025    Current Condition: wounds not healing, allergic to many products    Pain:  Pain Assessment: 0-10  0-10 (Numeric) Pain Score: 5 - Moderate pain  Pain Type: Chronic pain  Pain Location: Ankle  Pain Orientation: Right    Previous Interventions/Treatments: states she has tried compression but only makes her knee swollen    Lymphedema History:  Lymph Node Status:  enlarged lymph nodes in L axillary  Preceding Event: Slow Onset, Surgery, and Cellulitis  Current Compression: No   states that she has tried multiple methods of compression but it pushes up into the knee and causes increased mobility issues        Compression Pump:   No     Does elevation help: No     Precautions:   STEADI fall risk score (The score of 4 or more indicates an increased risk of falling): 6    Rehab Fall Risk: Low: Multiple or current diagnoses, Moderate: Sensory deficit, Moderate: Unsteady gait/use of " assistive device/apparent weakness or functionality challenged, and Moderate: Currently taking 4 or more prescription drugs    This patient is identified as a moderate fall risk based on the highest level factors present.    Outpatient Rehab Fall Risk Interventions:  Moderate Fall Risk Interventions: Moderate Fall Risk Interventions: Optimize clinic environment ensuring safe and accessible pathways     Standard Lymphedema Precautions    Prior Level of Function (PLOF)  Patient previously independent with all ADLs, occasional assistance with pantlegs d/t wound and sock donning  Exercise/Physical Activity: as tolerated, working on losing weight  Work/School: disability  Sleeps in: Standard flat bed, twin bed with spouse and cat  Patients Living Environment: apartment, 1 level, no stairs. Has license but does not have vehicle, uses public transportation    Primary Language: English    Patient's Goal(s) for Therapy: heal wound on RLE    Red Flags: Do you have any of the following? No  Fever/chills, unexplained weight changes, dizziness/fainting, unexplained change in bowel or bladder functions, unexplained malaise or muscle weakness, night pain/sweats, numbness or tingling    Objective   ADL's:    UB Bathing: Modified Independent   UB Dressing: Modified Independent   LB Bathing: Minimal Assist   LB Dressing: Minimal Assist, assist with sock and pantleg on RLE, assist with dressing changes    Balance: Sitting balance: good   Dynamic sitting balance: good   Standing balance: good   Dynamic standing balance: fair    Transfers: Modified Independent     Gait: limp    Assistive Device: no device     Strength: RLE: adequate ROM, adequate strength  and LLE: adequate ROM, adequate strength    Sensation: RLE:    Impaired    and LLE:    Impaired    Scar(s): Yes - R knee scar, L posterior calf    Lymphedema  Lymphedema Location and Appearance:  RLE: Dryness,  Fibrotic Edema,  Pitting Edema,  Hemosiderin Staining,  Hyperkeratosis,   Hyperpigmentation,  Lymphatic Cysts,  Papillomas,  + Stemmer Sign,  Ulceration,  Weeping,   LLE:  Dryness,  Fibrotic Edema,  Pitting Edema,  Hemosiderin Staining,  Hyperpigmentation,  + Stemmer Sign,       Media Information      Document Information    Photographic Image: Clinical Photo   Anterior leg   07/17/2025 10:46   Attached To:   Evaluation on 7/17/25 with Abram Dash, OT   Source Information     Media Information      Document Information    Photographic Image: Clinical Photo   Right medial view   07/17/2025 10:46   Attached To:   Evaluation on 7/17/25 with Abram Dash, OT   Source Information    Abram Dash, OT  [facility]   Document History         Media Information      Document Information    Photographic Image: Clinical Photo   R lateral view of leg   07/17/2025 10:46   Attached To:   Evaluation on 7/17/25 with Abram Dash, OT   Source Information    Abram Dash, OT  [facility]   Document History      Abram Dash, OT  [facility]   Document History      Media Information      Document Information    Photographic Image: Clinical Photo   Left LE   07/17/2025 11:00   Attached To:   Evaluation on 7/17/25 with Abram Dash, OT   Source Information    Abram Dash, OT  [facility]   Document History      Outcome Measures:    OT Adult Other Outcome Measures   Lymphedema Life Impact Scale (LLIS) 47     EDUCATION:   Individual(s) Educated: patient and spouse  Education Provided: Education: Anatomy and Physiology, Diagnosis and Precautions, Lymphedema, and Symptom Management  Risk and Benefits Discussed with Patient/Caregiver/Other: Yes   Patient/Caregiver Demonstrated Understanding: Yes   Plan of Care Discussed and Agreed Upon: Yes   Patient Response to Education: Patient/Caregiver verbalized understanding of information    Problems:  Problems to be addressed: Pain, Infection risk, Needs garment fitting, Impaired lymph flow, Decreased knowledge of condition/precautions, and Decreased  knowledge of home exercise program    Rehab Potential: Good    Assessment: Patient presents with Secondary Phlebolymphedema stage 2 BLE, 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 Pt would benefit from treatment to reduce volume and manage condition to prevent infection, maintain mobility, improve independence, and improve self management of condition.     Clinical Presentation: Evolving with changing characteristics    Complexity: moderate    Garment Recommendation: Patient would benefit from B calf and foot adjustable strap compression to accommodate change in size of limb based on consistent compression wear and fragile skin condition, will not tolerate standard off the shelf knee high garments. In addition, need transition liner socks Bilaterally with foot compression to enable patient to wear proper foot wear for proper safety with mobility. Also recommend compression jordy garment off the shelf at 15-18mmHg to accommodate knee swelling that has occurred in past with distal compression.    Plan:  Number of Treatments Authorized: needs auth  Planned Interventions include: ADL training, Therapeutic Exercise, Therapeutic Activity, Manual Lymph Drainage, Vasopneumatic pump, Patient and/or Caregiver Training, Self MLD training, HEP, and Garment measuring and fitting  Frequency: 2 x Week  Duration: 12 Weeks    Goals: Set and discussed today  Active       Lymphedema       LTG - Patient will have improved LLIS score by 8 points or greater indicating improving lymphedema and functional ability       Start:  07/18/25    Expected End:  10/10/25            LTG - By discharge patient will demonstrate decrease girth measurement of BLE by 2cm or until plateau       Start:  07/18/25    Expected End:  10/10/25            LTG - By discharge patient will display improved skin/tissue with no evidence of weeping and skin closure to prevent infection       Start:  07/18/25     Expected End:  10/10/25            LTG - By discharge the patient will be knowledgeable and compliant with home program, including lymphedema management and exercises       Start:  07/18/25    Expected End:  10/10/25            STG - Patient will be I with diaphragmatic breathing exercises 100% of the time       Start:  07/18/25    Expected End:  08/15/25            STG - Patient will perform HEP moderate independence       Start:  07/18/25    Expected End:  08/29/25            STG - Patient will be independent with donning/doffing of LE compression garments for daily compression per recommended wear schedule       Start:  07/18/25    Expected End:  09/12/25                Plan of care was developed with input and agreement by the patient.    In accordance with Chapter 4731-26 of the [place] Administrative Code, an intimate evaluation/treatment was not performed at this encounter. .    Abram Dash, OT

## 2025-07-18 PROBLEM — I89.0 LYMPHEDEMA: Status: ACTIVE | Noted: 2025-07-18

## 2025-07-21 ENCOUNTER — TELEPHONE (OUTPATIENT)
Dept: PRIMARY CARE | Facility: CLINIC | Age: 60
End: 2025-07-21

## 2025-07-21 ENCOUNTER — OFFICE VISIT (OUTPATIENT)
Dept: WOUND CARE | Facility: HOSPITAL | Age: 60
End: 2025-07-21
Payer: MEDICARE

## 2025-07-21 PROCEDURE — 99213 OFFICE O/P EST LOW 20 MIN: CPT

## 2025-07-21 PROCEDURE — 99213 OFFICE O/P EST LOW 20 MIN: CPT | Performed by: SURGERY

## 2025-07-21 NOTE — TELEPHONE ENCOUNTER
[facility] calling to verify pt's Dx with conditions diabetes, chronic heart failure, and cardio vascular disease --- Pt enrolled [employer] special needs plan - conditions need verified before approved >Please Advise     Case # - [id number]

## 2025-07-23 DIAGNOSIS — E11.9 DIABETES MELLITUS WITHOUT COMPLICATION: ICD-10-CM

## 2025-07-23 DIAGNOSIS — Z12.31 ENCOUNTER FOR SCREENING MAMMOGRAM FOR BREAST CANCER: ICD-10-CM

## 2025-07-28 ENCOUNTER — APPOINTMENT (OUTPATIENT)
Dept: WOUND CARE | Facility: HOSPITAL | Age: 60
End: 2025-07-28
Payer: MEDICARE

## 2025-07-30 ENCOUNTER — OFFICE VISIT (OUTPATIENT)
Dept: PRIMARY CARE | Facility: CLINIC | Age: 60
End: 2025-07-30
Payer: MEDICARE

## 2025-07-30 VITALS
SYSTOLIC BLOOD PRESSURE: 166 MMHG | DIASTOLIC BLOOD PRESSURE: 90 MMHG | HEART RATE: 73 BPM | OXYGEN SATURATION: 98 % | BODY MASS INDEX: 44.41 KG/M2 | WEIGHT: 293 LBS | HEIGHT: 68 IN

## 2025-07-30 DIAGNOSIS — J31.0 CHRONIC RHINITIS: ICD-10-CM

## 2025-07-30 DIAGNOSIS — I10 ESSENTIAL HYPERTENSION: ICD-10-CM

## 2025-07-30 DIAGNOSIS — F41.9 ANXIETY: ICD-10-CM

## 2025-07-30 DIAGNOSIS — S81.801D OPEN WOUND OF RIGHT LOWER EXTREMITY WITH COMPLICATION, SUBSEQUENT ENCOUNTER: ICD-10-CM

## 2025-07-30 DIAGNOSIS — E03.8 OTHER SPECIFIED HYPOTHYROIDISM: ICD-10-CM

## 2025-07-30 DIAGNOSIS — E11.9 DIABETES MELLITUS WITHOUT COMPLICATION: Primary | ICD-10-CM

## 2025-07-30 LAB — POC HEMOGLOBIN A1C: 6 % (ref 4.2–6.5)

## 2025-07-30 PROCEDURE — 83036 HEMOGLOBIN GLYCOSYLATED A1C: CPT | Mod: QW | Performed by: PHYSICIAN ASSISTANT

## 2025-07-30 PROCEDURE — 3080F DIAST BP >= 90 MM HG: CPT | Performed by: PHYSICIAN ASSISTANT

## 2025-07-30 PROCEDURE — 99214 OFFICE O/P EST MOD 30 MIN: CPT | Performed by: PHYSICIAN ASSISTANT

## 2025-07-30 PROCEDURE — 3008F BODY MASS INDEX DOCD: CPT | Performed by: PHYSICIAN ASSISTANT

## 2025-07-30 PROCEDURE — 1036F TOBACCO NON-USER: CPT | Performed by: PHYSICIAN ASSISTANT

## 2025-07-30 PROCEDURE — 3077F SYST BP >= 140 MM HG: CPT | Performed by: PHYSICIAN ASSISTANT

## 2025-07-30 PROCEDURE — 3044F HG A1C LEVEL LT 7.0%: CPT | Performed by: PHYSICIAN ASSISTANT

## 2025-07-30 RX ORDER — METOPROLOL SUCCINATE 25 MG/1
25 TABLET, EXTENDED RELEASE ORAL DAILY
Qty: 30 TABLET | Refills: 2 | Status: SHIPPED | OUTPATIENT
Start: 2025-07-30 | End: 2025-07-31 | Stop reason: SDUPTHER

## 2025-07-30 RX ORDER — FLUTICASONE PROPIONATE 50 MCG
1 SPRAY, SUSPENSION (ML) NASAL DAILY
Qty: 48 G | Refills: 3 | Status: SHIPPED | OUTPATIENT
Start: 2025-07-30 | End: 2026-07-30

## 2025-07-30 RX ORDER — LANCETS 33 GAUGE
EACH MISCELLANEOUS
Qty: 200 EACH | Refills: 3 | Status: SHIPPED | OUTPATIENT
Start: 2025-07-30

## 2025-07-30 ASSESSMENT — PAIN SCALES - GENERAL: PAINLEVEL_OUTOF10: 5

## 2025-07-30 NOTE — PROGRESS NOTES
"Subjective   Patient ID: Mrs. Saba Hurtado is a 59 y.o. female who presents for Follow-up.    Presents for follow up -   Has been under care of wound clinic and has noticed improvement of her wound.   HTN - has not started lisinopril as she has low BP at times. Recent readings at wound clinic and pulmonology have been as high as 200/100. Does have chronic pain and anxiety situationally that may escalate readings at times.   Anxiety- stable on alprazolam as needed.   DM - diet controlled currently. Reviewed in detail pathophysiology of diabetes as progressive nature.   Rhinitis - brand name flonase not covered but generic is, will send.          Review of Systems   All other systems reviewed and are negative.      Objective   /90   Pulse 73   Ht 1.727 m (5' 8\")   Wt 137 kg (302 lb)   SpO2 98%   BMI 45.92 kg/m²     Physical Exam  Constitutional:       Appearance: Normal appearance. She is obese.   HENT:      Mouth/Throat:      Pharynx: Oropharynx is clear.     Cardiovascular:      Rate and Rhythm: Normal rate and regular rhythm.     Neurological:      Mental Status: She is alert.         Assessment/Plan   Diagnoses and all orders for this visit:  Diabetes mellitus without complication  -     POCT glycosylated hemoglobin (Hb A1C) manually resulted  -     Comprehensive metabolic panel; Future  -     Lipid panel; Future  -     Albumin-Creatinine Ratio, Urine Random; Future  -     CBC; Future  -     TSH with reflex to Free T4 if abnormal; Future  Essential hypertension  -     metoprolol succinate XL (Toprol-XL) 25 mg 24 hr tablet; Take 1 tablet (25 mg) by mouth once daily. Do not crush or chew.  Anxiety  Open wound of right lower extremity with complication, subsequent encounter  -     CBC; Future  Chronic rhinitis  -     fluticasone (Flonase) 50 mcg/actuation nasal spray; Administer 1 spray into each nostril once daily. Shake gently. Before first use, prime pump. After use, clean tip and replace " cap.  Other specified hypothyroidism  -     Comprehensive metabolic panel; Future  -     Lipid panel; Future  -     Albumin-Creatinine Ratio, Urine Random; Future  -     CBC; Future  -     TSH with reflex to Free T4 if abnormal; Future    A1C fairly stable.   Follow up 3 months AWV

## 2025-07-31 RX ORDER — METOPROLOL SUCCINATE 25 MG/1
25 TABLET, EXTENDED RELEASE ORAL DAILY
Qty: 90 TABLET | Refills: 1 | Status: SHIPPED | OUTPATIENT
Start: 2025-07-31

## 2025-08-04 ENCOUNTER — APPOINTMENT (OUTPATIENT)
Dept: OCCUPATIONAL THERAPY | Facility: HOSPITAL | Age: 60
End: 2025-08-04
Payer: MEDICARE

## 2025-08-04 DIAGNOSIS — E11.9 DIABETES MELLITUS WITHOUT COMPLICATION: ICD-10-CM

## 2025-08-04 RX ORDER — LANCETS 33 GAUGE
EACH MISCELLANEOUS
Qty: 100 EACH | Refills: 11 | Status: SHIPPED | OUTPATIENT
Start: 2025-08-04

## 2025-08-06 ENCOUNTER — OFFICE VISIT (OUTPATIENT)
Dept: PRIMARY CARE | Facility: CLINIC | Age: 60
End: 2025-08-06
Payer: MEDICARE

## 2025-08-06 ENCOUNTER — HOSPITAL ENCOUNTER (OUTPATIENT)
Dept: RADIOLOGY | Facility: CLINIC | Age: 60
Discharge: HOME | End: 2025-08-06
Payer: MEDICARE

## 2025-08-06 DIAGNOSIS — M25.571 RIGHT ANKLE PAIN, UNSPECIFIED CHRONICITY: ICD-10-CM

## 2025-08-06 DIAGNOSIS — M25.571 RIGHT ANKLE PAIN, UNSPECIFIED CHRONICITY: Primary | ICD-10-CM

## 2025-08-06 DIAGNOSIS — S81.801D OPEN WOUND OF RIGHT LOWER EXTREMITY WITH COMPLICATION, SUBSEQUENT ENCOUNTER: ICD-10-CM

## 2025-08-06 PROCEDURE — 3044F HG A1C LEVEL LT 7.0%: CPT | Performed by: PHYSICIAN ASSISTANT

## 2025-08-06 PROCEDURE — 87185 SC STD ENZYME DETCJ PER NZM: CPT | Performed by: PHYSICIAN ASSISTANT

## 2025-08-06 PROCEDURE — 73610 X-RAY EXAM OF ANKLE: CPT | Mod: RT

## 2025-08-06 PROCEDURE — 73610 X-RAY EXAM OF ANKLE: CPT | Mod: RIGHT SIDE | Performed by: RADIOLOGY

## 2025-08-06 PROCEDURE — 99214 OFFICE O/P EST MOD 30 MIN: CPT | Performed by: PHYSICIAN ASSISTANT

## 2025-08-06 NOTE — PROGRESS NOTES
Subjective   Patient ID: Mrs. Saba Hurtado is a 59 y.o. female who presents for No chief complaint on file..    Presents for increased pain to RLE along with increased drainage and redness, pain with weight bearing. Denies injury or fall.   Requesting swab as this feels infected.          Review of Systems   All other systems reviewed and are negative.      Objective   There were no vitals taken for this visit.    Physical Exam  Constitutional:       Appearance: Normal appearance.     Skin:     Comments: RLE with epidermal loss, swelling, drainage, tenderness. See scanned images.      Neurological:      Mental Status: She is alert.         Assessment/Plan   Diagnoses and all orders for this visit:  Right ankle pain, unspecified chronicity  -     XR ankle right 3+ views; Future  -     CBC and Auto Differential; Future  -     Sedimentation Rate; Future  -     Uric acid; Future  -     Comprehensive metabolic panel; Future  Open wound of right lower extremity with complication, subsequent encounter  -     Tissue/Wound Culture/Smear  -     CBC and Auto Differential; Future  -     Sedimentation Rate; Future  -     Uric acid; Future  -     Comprehensive metabolic panel; Future    Follow up wound care.   Keep elevated and avoid weight bearing.

## 2025-08-09 LAB
B-LACTAMASE ORGANISM ISLT: POSITIVE
BACTERIA SPEC CULT: ABNORMAL
GRAM STN SPEC: ABNORMAL
GRAM STN SPEC: ABNORMAL

## 2025-08-10 DIAGNOSIS — R05.9 COUGH, UNSPECIFIED TYPE: ICD-10-CM

## 2025-08-11 ENCOUNTER — OFFICE VISIT (OUTPATIENT)
Dept: WOUND CARE | Facility: HOSPITAL | Age: 60
End: 2025-08-11
Payer: MEDICARE

## 2025-08-11 DIAGNOSIS — L03.115 CELLULITIS OF LEG, RIGHT: Primary | ICD-10-CM

## 2025-08-11 PROCEDURE — 3044F HG A1C LEVEL LT 7.0%: CPT | Performed by: SURGERY

## 2025-08-11 PROCEDURE — 11042 DBRDMT SUBQ TIS 1ST 20SQCM/<: CPT | Performed by: SURGERY

## 2025-08-11 PROCEDURE — 11042 DBRDMT SUBQ TIS 1ST 20SQCM/<: CPT

## 2025-08-11 RX ORDER — CEPHALEXIN 500 MG/1
500 CAPSULE ORAL 2 TIMES DAILY
Qty: 28 CAPSULE | Refills: 0 | Status: SHIPPED | OUTPATIENT
Start: 2025-08-11 | End: 2025-08-25

## 2025-08-11 RX ORDER — ALBUTEROL SULFATE 90 UG/1
INHALANT RESPIRATORY (INHALATION)
Qty: 8.5 G | Refills: 1 | Status: SHIPPED | OUTPATIENT
Start: 2025-08-11

## 2025-08-20 ENCOUNTER — TREATMENT (OUTPATIENT)
Dept: OCCUPATIONAL THERAPY | Facility: HOSPITAL | Age: 60
End: 2025-08-20
Payer: MEDICARE

## 2025-08-20 DIAGNOSIS — I89.0 LYMPHEDEMA: ICD-10-CM

## 2025-08-20 PROCEDURE — 97530 THERAPEUTIC ACTIVITIES: CPT | Mod: GO | Performed by: OCCUPATIONAL THERAPIST

## 2025-08-25 ENCOUNTER — OFFICE VISIT (OUTPATIENT)
Dept: WOUND CARE | Facility: HOSPITAL | Age: 60
End: 2025-08-25
Payer: MEDICARE

## 2025-08-25 PROCEDURE — 11042 DBRDMT SUBQ TIS 1ST 20SQCM/<: CPT | Mod: RT

## 2025-08-25 PROCEDURE — 11042 DBRDMT SUBQ TIS 1ST 20SQCM/<: CPT | Performed by: SURGERY

## 2025-08-28 ENCOUNTER — TREATMENT (OUTPATIENT)
Dept: OCCUPATIONAL THERAPY | Facility: HOSPITAL | Age: 60
End: 2025-08-28
Payer: MEDICARE

## 2025-08-28 DIAGNOSIS — I89.0 LYMPHEDEMA: ICD-10-CM

## 2025-08-28 DIAGNOSIS — N95.1 MENOPAUSAL SYMPTOMS: ICD-10-CM

## 2025-08-28 PROCEDURE — 97530 THERAPEUTIC ACTIVITIES: CPT | Mod: GO | Performed by: OCCUPATIONAL THERAPIST

## 2025-08-29 RX ORDER — ESTRADIOL 0.1 MG/D
1 PATCH, EXTENDED RELEASE TRANSDERMAL 2 TIMES WEEKLY
Qty: 24 PATCH | Refills: 3 | Status: SHIPPED | OUTPATIENT
Start: 2025-09-01 | End: 2026-09-01

## 2025-09-02 ENCOUNTER — APPOINTMENT (OUTPATIENT)
Dept: CARDIOLOGY | Facility: CLINIC | Age: 60
End: 2025-09-02
Payer: MEDICARE